# Patient Record
Sex: MALE | Race: WHITE | Employment: FULL TIME | URBAN - METROPOLITAN AREA
[De-identification: names, ages, dates, MRNs, and addresses within clinical notes are randomized per-mention and may not be internally consistent; named-entity substitution may affect disease eponyms.]

---

## 2018-01-18 ENCOUNTER — HOSPITAL ENCOUNTER (INPATIENT)
Age: 58
LOS: 4 days | Discharge: HOME | DRG: 248 | End: 2018-01-22
Attending: INTERNAL MEDICINE | Admitting: INTERNAL MEDICINE
Payer: COMMERCIAL

## 2018-01-18 PROBLEM — I10 ESSENTIAL HYPERTENSION: Chronic | Status: ACTIVE | Noted: 2018-01-18

## 2018-01-18 PROBLEM — E78.5 DYSLIPIDEMIA: Chronic | Status: ACTIVE | Noted: 2018-01-18

## 2018-01-18 PROBLEM — I21.3 STEMI (ST ELEVATION MYOCARDIAL INFARCTION) (HCC): Status: ACTIVE | Noted: 2018-01-18

## 2018-01-18 PROBLEM — I21.02 STEMI INVOLVING LEFT ANTERIOR DESCENDING CORONARY ARTERY (HCC): Status: ACTIVE | Noted: 2018-01-18

## 2018-01-18 PROBLEM — Z72.0 TOBACCO ABUSE: Chronic | Status: ACTIVE | Noted: 2018-01-18

## 2018-01-18 PROBLEM — I25.10 CORONARY ARTERY DISEASE INVOLVING NATIVE CORONARY ARTERY OF NATIVE HEART: Chronic | Status: ACTIVE | Noted: 2018-01-18

## 2018-01-18 LAB
ACT BLD: 252 SECS (ref 70–128)
ACT BLD: 301 SECS (ref 70–128)
ALBUMIN SERPL-MCNC: 3.6 G/DL (ref 3.5–5)
ALBUMIN/GLOB SERPL: 1 {RATIO} (ref 1.2–3.5)
ALP SERPL-CCNC: 78 U/L (ref 50–136)
ALT SERPL-CCNC: 50 U/L (ref 12–65)
ANION GAP SERPL CALC-SCNC: 11 MMOL/L (ref 7–16)
AST SERPL-CCNC: 245 U/L (ref 15–37)
ATRIAL RATE: 94 BPM
BASOPHILS # BLD: 0 K/UL (ref 0–0.2)
BASOPHILS NFR BLD: 0 % (ref 0–2)
BILIRUB SERPL-MCNC: 0.4 MG/DL (ref 0.2–1.1)
BUN SERPL-MCNC: 12 MG/DL (ref 6–23)
CALCIUM SERPL-MCNC: 8.9 MG/DL (ref 8.3–10.4)
CALCULATED R AXIS, ECG10: -21 DEGREES
CALCULATED T AXIS, ECG11: 17 DEGREES
CHLORIDE SERPL-SCNC: 105 MMOL/L (ref 98–107)
CO2 SERPL-SCNC: 23 MMOL/L (ref 21–32)
CREAT SERPL-MCNC: 0.87 MG/DL (ref 0.8–1.5)
DIAGNOSIS, 93000: NORMAL
DIFFERENTIAL METHOD BLD: ABNORMAL
EOSINOPHIL # BLD: 0 K/UL (ref 0–0.8)
EOSINOPHIL NFR BLD: 0 % (ref 0.5–7.8)
ERYTHROCYTE [DISTWIDTH] IN BLOOD BY AUTOMATED COUNT: 13.4 % (ref 11.9–14.6)
GLOBULIN SER CALC-MCNC: 3.6 G/DL (ref 2.3–3.5)
GLUCOSE SERPL-MCNC: 160 MG/DL (ref 65–100)
HCT VFR BLD AUTO: 43.5 % (ref 41.1–50.3)
HGB BLD-MCNC: 15 G/DL (ref 13.6–17.2)
IMM GRANULOCYTES # BLD: 0 K/UL (ref 0–0.5)
IMM GRANULOCYTES NFR BLD AUTO: 0 % (ref 0–5)
LYMPHOCYTES # BLD: 2.6 K/UL (ref 0.5–4.6)
LYMPHOCYTES NFR BLD: 17 % (ref 13–44)
MCH RBC QN AUTO: 30.4 PG (ref 26.1–32.9)
MCHC RBC AUTO-ENTMCNC: 34.5 G/DL (ref 31.4–35)
MCV RBC AUTO: 88.1 FL (ref 79.6–97.8)
MONOCYTES # BLD: 0.6 K/UL (ref 0.1–1.3)
MONOCYTES NFR BLD: 4 % (ref 4–12)
NEUTS SEG # BLD: 11.7 K/UL (ref 1.7–8.2)
NEUTS SEG NFR BLD: 79 % (ref 43–78)
P-R INTERVAL, ECG05: 152 MS
PLATELET # BLD AUTO: 300 K/UL (ref 150–450)
PMV BLD AUTO: 10.6 FL (ref 10.8–14.1)
POTASSIUM SERPL-SCNC: 3.7 MMOL/L (ref 3.5–5.1)
PROT SERPL-MCNC: 7.2 G/DL (ref 6.3–8.2)
Q-T INTERVAL, ECG07: 366 MS
QRS DURATION, ECG06: 130 MS
QTC CALCULATION (BEZET), ECG08: 457 MS
RBC # BLD AUTO: 4.94 M/UL (ref 4.23–5.67)
SODIUM SERPL-SCNC: 139 MMOL/L (ref 136–145)
TROPONIN I SERPL-MCNC: >40 NG/ML (ref 0.02–0.05)
VENTRICULAR RATE, ECG03: 94 BPM
WBC # BLD AUTO: 15 K/UL (ref 4.3–11.1)

## 2018-01-18 PROCEDURE — 75810000275 HC EMERGENCY DEPT VISIT NO LEVEL OF CARE: Performed by: EMERGENCY MEDICINE

## 2018-01-18 PROCEDURE — 92978 ENDOLUMINL IVUS OCT C 1ST: CPT

## 2018-01-18 PROCEDURE — 77030004559 HC CATH ANGI DX SUPT CARD -B

## 2018-01-18 PROCEDURE — 77030004558 HC CATH ANGI DX SUPR TORQ CARD -A

## 2018-01-18 PROCEDURE — 77030012468 HC VLV BLEEDBK CNTRL ABBT -B

## 2018-01-18 PROCEDURE — C1769 GUIDE WIRE: HCPCS

## 2018-01-18 PROCEDURE — B2151ZZ FLUOROSCOPY OF LEFT HEART USING LOW OSMOLAR CONTRAST: ICD-10-PCS | Performed by: INTERNAL MEDICINE

## 2018-01-18 PROCEDURE — 77030019569 HC BND COMPR RAD TERU -B

## 2018-01-18 PROCEDURE — 93005 ELECTROCARDIOGRAM TRACING: CPT | Performed by: INTERNAL MEDICINE

## 2018-01-18 PROCEDURE — 74011250637 HC RX REV CODE- 250/637: Performed by: INTERNAL MEDICINE

## 2018-01-18 PROCEDURE — C1753 CATH, INTRAVAS ULTRASOUND: HCPCS

## 2018-01-18 PROCEDURE — C1725 CATH, TRANSLUMIN NON-LASER: HCPCS

## 2018-01-18 PROCEDURE — C1887 CATHETER, GUIDING: HCPCS

## 2018-01-18 PROCEDURE — 80053 COMPREHEN METABOLIC PANEL: CPT | Performed by: INTERNAL MEDICINE

## 2018-01-18 PROCEDURE — 74011250636 HC RX REV CODE- 250/636: Performed by: INTERNAL MEDICINE

## 2018-01-18 PROCEDURE — 92941 PRQ TRLML REVSC TOT OCCL AMI: CPT

## 2018-01-18 PROCEDURE — B2111ZZ FLUOROSCOPY OF MULTIPLE CORONARY ARTERIES USING LOW OSMOLAR CONTRAST: ICD-10-PCS | Performed by: INTERNAL MEDICINE

## 2018-01-18 PROCEDURE — 36415 COLL VENOUS BLD VENIPUNCTURE: CPT | Performed by: INTERNAL MEDICINE

## 2018-01-18 PROCEDURE — 99153 MOD SED SAME PHYS/QHP EA: CPT

## 2018-01-18 PROCEDURE — C1876 STENT, NON-COA/NON-COV W/DEL: HCPCS

## 2018-01-18 PROCEDURE — 85025 COMPLETE CBC W/AUTO DIFF WBC: CPT | Performed by: INTERNAL MEDICINE

## 2018-01-18 PROCEDURE — 85347 COAGULATION TIME ACTIVATED: CPT

## 2018-01-18 PROCEDURE — 02C03ZZ EXTIRPATION OF MATTER FROM CORONARY ARTERY, ONE ARTERY, PERCUTANEOUS APPROACH: ICD-10-PCS | Performed by: INTERNAL MEDICINE

## 2018-01-18 PROCEDURE — 74011000250 HC RX REV CODE- 250: Performed by: INTERNAL MEDICINE

## 2018-01-18 PROCEDURE — 84484 ASSAY OF TROPONIN QUANT: CPT | Performed by: INTERNAL MEDICINE

## 2018-01-18 PROCEDURE — 4A023N7 MEASUREMENT OF CARDIAC SAMPLING AND PRESSURE, LEFT HEART, PERCUTANEOUS APPROACH: ICD-10-PCS | Performed by: INTERNAL MEDICINE

## 2018-01-18 PROCEDURE — 93458 L HRT ARTERY/VENTRICLE ANGIO: CPT

## 2018-01-18 PROCEDURE — 74011636320 HC RX REV CODE- 636/320: Performed by: INTERNAL MEDICINE

## 2018-01-18 PROCEDURE — C8929 TTE W OR WO FOL WCON,DOPPLER: HCPCS

## 2018-01-18 PROCEDURE — 65610000006 HC RM INTENSIVE CARE

## 2018-01-18 PROCEDURE — 02713EZ DILATION OF CORONARY ARTERY, TWO ARTERIES WITH TWO INTRALUMINAL DEVICES, PERCUTANEOUS APPROACH: ICD-10-PCS | Performed by: INTERNAL MEDICINE

## 2018-01-18 PROCEDURE — C1894 INTRO/SHEATH, NON-LASER: HCPCS

## 2018-01-18 PROCEDURE — C1757 CATH, THROMBECTOMY/EMBOLECT: HCPCS

## 2018-01-18 PROCEDURE — 99152 MOD SED SAME PHYS/QHP 5/>YRS: CPT

## 2018-01-18 RX ORDER — HEPARIN SODIUM 10000 [USP'U]/ML
2000-9000 INJECTION, SOLUTION INTRAVENOUS; SUBCUTANEOUS
Status: DISCONTINUED | OUTPATIENT
Start: 2018-01-18 | End: 2018-01-22 | Stop reason: HOSPADM

## 2018-01-18 RX ORDER — SODIUM CHLORIDE 9 MG/ML
75 INJECTION, SOLUTION INTRAVENOUS CONTINUOUS
Status: DISCONTINUED | OUTPATIENT
Start: 2018-01-18 | End: 2018-01-20

## 2018-01-18 RX ORDER — ATORVASTATIN CALCIUM 40 MG/1
80 TABLET, FILM COATED ORAL
Status: DISCONTINUED | OUTPATIENT
Start: 2018-01-18 | End: 2018-01-22 | Stop reason: HOSPADM

## 2018-01-18 RX ORDER — SODIUM CHLORIDE 0.9 % (FLUSH) 0.9 %
5-10 SYRINGE (ML) INJECTION AS NEEDED
Status: DISCONTINUED | OUTPATIENT
Start: 2018-01-18 | End: 2018-01-22 | Stop reason: HOSPADM

## 2018-01-18 RX ORDER — GUAIFENESIN 100 MG/5ML
81 LIQUID (ML) ORAL DAILY
Status: DISCONTINUED | OUTPATIENT
Start: 2018-01-19 | End: 2018-01-22 | Stop reason: HOSPADM

## 2018-01-18 RX ORDER — HYDRALAZINE HYDROCHLORIDE 20 MG/ML
10 INJECTION INTRAMUSCULAR; INTRAVENOUS
Status: DISCONTINUED | OUTPATIENT
Start: 2018-01-18 | End: 2018-01-22 | Stop reason: HOSPADM

## 2018-01-18 RX ORDER — FENTANYL CITRATE 50 UG/ML
25-75 INJECTION, SOLUTION INTRAMUSCULAR; INTRAVENOUS
Status: DISCONTINUED | OUTPATIENT
Start: 2018-01-18 | End: 2018-01-22 | Stop reason: HOSPADM

## 2018-01-18 RX ORDER — SODIUM CHLORIDE 0.9 % (FLUSH) 0.9 %
5-10 SYRINGE (ML) INJECTION EVERY 8 HOURS
Status: DISCONTINUED | OUTPATIENT
Start: 2018-01-18 | End: 2018-01-22 | Stop reason: HOSPADM

## 2018-01-18 RX ORDER — LORAZEPAM 1 MG/1
1 TABLET ORAL
Status: DISCONTINUED | OUTPATIENT
Start: 2018-01-18 | End: 2018-01-22 | Stop reason: HOSPADM

## 2018-01-18 RX ORDER — ACETAMINOPHEN 325 MG/1
650 TABLET ORAL
Status: DISCONTINUED | OUTPATIENT
Start: 2018-01-18 | End: 2018-01-22 | Stop reason: HOSPADM

## 2018-01-18 RX ORDER — ONDANSETRON 2 MG/ML
4 INJECTION INTRAMUSCULAR; INTRAVENOUS AS NEEDED
Status: DISCONTINUED | OUTPATIENT
Start: 2018-01-18 | End: 2018-01-22 | Stop reason: HOSPADM

## 2018-01-18 RX ORDER — CARVEDILOL 12.5 MG/1
12.5 TABLET ORAL
Status: DISCONTINUED | OUTPATIENT
Start: 2018-01-18 | End: 2018-01-22 | Stop reason: HOSPADM

## 2018-01-18 RX ORDER — CARVEDILOL 6.25 MG/1
6.25 TABLET ORAL ONCE
Status: COMPLETED | OUTPATIENT
Start: 2018-01-18 | End: 2018-01-18

## 2018-01-18 RX ORDER — CARVEDILOL 3.12 MG/1
3.12 TABLET ORAL EVERY 12 HOURS
Status: DISCONTINUED | OUTPATIENT
Start: 2018-01-18 | End: 2018-01-18

## 2018-01-18 RX ORDER — HYDROCODONE BITARTRATE AND ACETAMINOPHEN 5; 325 MG/1; MG/1
1 TABLET ORAL
Status: DISCONTINUED | OUTPATIENT
Start: 2018-01-18 | End: 2018-01-22 | Stop reason: HOSPADM

## 2018-01-18 RX ORDER — MORPHINE SULFATE 2 MG/ML
2 INJECTION, SOLUTION INTRAMUSCULAR; INTRAVENOUS
Status: DISCONTINUED | OUTPATIENT
Start: 2018-01-18 | End: 2018-01-22 | Stop reason: HOSPADM

## 2018-01-18 RX ORDER — EPTIFIBATIDE 0.75 MG/ML
2 INJECTION, SOLUTION INTRAVENOUS CONTINUOUS
Status: DISCONTINUED | OUTPATIENT
Start: 2018-01-18 | End: 2018-01-22 | Stop reason: HOSPADM

## 2018-01-18 RX ORDER — LISINOPRIL 5 MG/1
10 TABLET ORAL DAILY
Status: DISCONTINUED | OUTPATIENT
Start: 2018-01-19 | End: 2018-01-22 | Stop reason: HOSPADM

## 2018-01-18 RX ORDER — MIDAZOLAM HYDROCHLORIDE 1 MG/ML
.5-2 INJECTION, SOLUTION INTRAMUSCULAR; INTRAVENOUS
Status: DISCONTINUED | OUTPATIENT
Start: 2018-01-18 | End: 2018-01-22 | Stop reason: HOSPADM

## 2018-01-18 RX ORDER — LIDOCAINE HYDROCHLORIDE 20 MG/ML
2-10 INJECTION, SOLUTION INFILTRATION; PERINEURAL
Status: DISCONTINUED | OUTPATIENT
Start: 2018-01-18 | End: 2018-01-22 | Stop reason: HOSPADM

## 2018-01-18 RX ORDER — HEPARIN SODIUM 200 [USP'U]/100ML
3 INJECTION, SOLUTION INTRAVENOUS CONTINUOUS
Status: DISCONTINUED | OUTPATIENT
Start: 2018-01-18 | End: 2018-01-22 | Stop reason: HOSPADM

## 2018-01-18 RX ORDER — NITROGLYCERIN 0.4 MG/1
0.4 TABLET SUBLINGUAL
Status: DISCONTINUED | OUTPATIENT
Start: 2018-01-18 | End: 2018-01-22 | Stop reason: HOSPADM

## 2018-01-18 RX ADMIN — HEPARIN 3 UNITS/HR: 100 SYRINGE at 13:12

## 2018-01-18 RX ADMIN — SODIUM CHLORIDE 75 ML/HR: 900 INJECTION, SOLUTION INTRAVENOUS at 15:30

## 2018-01-18 RX ADMIN — HYDROCODONE BITARTRATE AND ACETAMINOPHEN 1 TABLET: 5; 325 TABLET ORAL at 20:16

## 2018-01-18 RX ADMIN — PERFLUTREN 1 ML: 6.52 INJECTION, SUSPENSION INTRAVENOUS at 16:14

## 2018-01-18 RX ADMIN — FENTANYL CITRATE 50 MCG: 50 INJECTION, SOLUTION INTRAMUSCULAR; INTRAVENOUS at 13:13

## 2018-01-18 RX ADMIN — CARVEDILOL 3.12 MG: 3.12 TABLET, FILM COATED ORAL at 16:33

## 2018-01-18 RX ADMIN — TICAGRELOR 180 MG: 90 TABLET ORAL at 14:11

## 2018-01-18 RX ADMIN — IOPAMIDOL 275 ML: 755 INJECTION, SOLUTION INTRAVENOUS at 14:17

## 2018-01-18 RX ADMIN — MIDAZOLAM HYDROCHLORIDE 2 MG: 1 INJECTION, SOLUTION INTRAMUSCULAR; INTRAVENOUS at 13:13

## 2018-01-18 RX ADMIN — HEPARIN SODIUM 9000 UNITS: 10000 INJECTION, SOLUTION INTRAVENOUS; SUBCUTANEOUS at 13:16

## 2018-01-18 RX ADMIN — CARVEDILOL 6.25 MG: 6.25 TABLET, FILM COATED ORAL at 19:44

## 2018-01-18 RX ADMIN — LIDOCAINE HYDROCHLORIDE 60 MG: 20 INJECTION, SOLUTION INFILTRATION; PERINEURAL at 13:18

## 2018-01-18 RX ADMIN — HEPARIN SODIUM 2 ML: 10000 INJECTION, SOLUTION INTRAVENOUS; SUBCUTANEOUS at 13:18

## 2018-01-18 RX ADMIN — ONDANSETRON 4 MG: 2 INJECTION INTRAMUSCULAR; INTRAVENOUS at 13:14

## 2018-01-18 RX ADMIN — Medication 10 ML: at 20:11

## 2018-01-18 RX ADMIN — EPTIFIBATIDE 2 MCG/KG/MIN: 0.75 INJECTION, SOLUTION INTRAVENOUS at 13:24

## 2018-01-18 NOTE — PROGRESS NOTES
Patient came into the ER with a stemi, patient was transferred to the Cath Lab where the patient received treatment.  provided support, prayer and communication to the family members. The patient was transferred to the ICU/CCU unit for continued care.         [employer]

## 2018-01-18 NOTE — PROCEDURES
[address] [place] [address]    Tammi Mishra  MR#: [id number]  : 1960  ACCOUNT #: [de-identified]   DATE OF SERVICE: 2018    PRIMARY CARDIOLOGIST:  None. PRIMARY CARE PHYSICIAN:  None. BRIEF HISTORY:  The patient is a 67-year-old gentleman with no past medical history and really no medical care with extensive tobacco abuse, presents with acute onset chest pain yesterday, which resolved and came back this morning and he was brought emergently to the cardiac catheterization lab due ST elevation anterior myocardial infarction. DESCRIPTION OF PROCEDURE: After informed consent, he was prepped and draped in the usual sterile fashion. The right wrist was infiltrated with lidocaine. The right radial artery was accessed via micropuncture technique. A 6-Syriac sheath was advanced without complications. A 5 Western Cristine JR5 was utilized for right coronary injection. A 6-Syriac XB 3.0 guide was utilized for left coronary injection as well as left anterior descending PCI and left main IVUS. A 6-Syriac angled pigtail was utilized for left ventriculography. Isovue contrast utilized. ANESTHESIA: Conscious sedation. START TIME:  1:13 p.m.    END TIME:  2:16 p.m. MEDICATIONS: Versed 2 mg and 30 mcg of  fentanyl. MONITORING RN: Wendy Ramires. FINDINGS  1. Left ventricle: Moderate LV systolic dysfunction with ejection fraction of 40% with anterior akinesis present. Left ventricular and diastolic pressures measured 24 mmHg and there was a 15 mm aortic valve gradient. 2.  Left main:  The left main is large and somewhat ectatic proximally, but tapers briskly distally with extensive calcification. It appears to be approximately at 80% stenosis, bifurcates to the LAD and circumflex systems. 3.  Left anterior descending coronary: This is a large vessel. It is extremely calcified.   It is occluded in the proximal portion just beyond a very proximal diagonal.  4.  Left circumflex coronary artery: This is a codominant vessel. It has proximal 90% stenosis with a moderate sized first obtuse marginal and a left posterior descending present. 5.  Right coronary: This is a small codominant vessel with 80% proximal stenosis. INTRAVASCULAR ULTRASOUND ANALYSIS: Lesion, left main. DETAILS: The patient anticoagulated with heparin and Integrilin. Intravascular ultrasound analysis demonstrates severe distal left main calcification with a minimal luminal area of 6.2 cm2, which suggests hemodynamically significant distal left main stenosis. The LAD also demonstrates extensive calcification throughout the proximal portion. PERCUTANEOUS CORONARY INTERVENTION: Lesion, proximal LAD. Pre-stenosis 100% , post-stenosis 0%. DETAILS:  The patient anticoagulated with heparin and Integrilin. After adequate ACT, a 6-Uzbek XB 3.5 guide was utilized. Sidra Al was advanced distally. The lesion was initially aspirated with a priority I aspiration catheter, which restored MAGUI 3 flow. We had extensive difficulty delivering balloons distally due to the extreme calcification and a 2.5 x 12 U4 balloon was utilized initially followed by 2.5 x 20 NC [place] balloon. Despite predilatation, we were still unable to deliver a Vision bare metal stent. Despite aggressive efforts and a GuideLiner support, we were unable to cross the distal lesion. The Vision was then removed and a 2.5 x 26 Integrity stent was then utilized and was able to be delivered distally with the aid of a GuideLiner. During delivering this, there appeared to be disruption of the more proximal LAD, likely from aggressive GuideLiner manipulation. The stent was then deployed successfully followed by an additional 2.5 x 16 Integrity bare metal stent extending back to the ostium of the LAD. These were all inflated to high pressures and postdilated with noncompliant balloon.   Post-procedural intravascular ultrasound analysis was performed demonstrating stent apposition throughout the entire proximal LAD with approximately 2 struts hanging back into the distal left main. These all appear opposed. There is MAGUI 3 flow with contained proximal LAD disruption. Brilinta 180 mg of Brilinta were administered in the lab. Successful hemostasis with pneumatic radial band. CONCLUSIONS  1. Moderate left ventricular systolic dysfunction. 2.  Severe distal left main stenosis confirmed by intravascular ultrasound analysis. 3.  Severe calcific proximal LAD disease with abrupt occlusion, status post aspiration thrombectomy and Integrity bare metal stent x2, status post, post-procedural IVUS assessment due to proximal LAD disruption. 4.  Severe proximal codominant left circumflex stenosis. 5.  Severe small codominant right coronary artery stenosis. RECOMMENDATIONS:  The patient will be managed medically in approximately two to four weeks. Should undergo coronary bypass grafting with LIMA to the LAD, vein graft to the diagonal, vein graft to the first obtuse marginal and vein graft to the left posterior descending. Thank you for allowing us to participate in the care of this patient. If there are questions or concerns, please feel free to contact me. Sincerely Addie Rider put a conscious sedation, right before. time 1:13 p.m.  time 2:16 p.m. MEDICATIONS:  2 mg Versed, 50 mcg of fentanyl.       Rainell Pila, MD Tessie Cogan / Raymundo  D: 01/18/2018 14:39     T: 01/18/2018 15:19  JOB #: [id number]

## 2018-01-18 NOTE — PROGRESS NOTES
Patient came into the ER with a stemi, patient was transferred to the Cath Lab where the patient received treatment.  provided support, prayer and communication to the family members. The patient was transferred to the CVICU unit for continued care.       [employer]

## 2018-01-18 NOTE — IP AVS SNAPSHOT
[address] 
[phone] Patient: Rebecca Boyd MRN: [id number] QTF:48/5/7856 About your hospitalization You were admitted on:  January 18, 2018 You last received care in the:  [facility] 2 CV STEPDOWN You were discharged on:  January 22, 2018 Why you were hospitalized Your primary diagnosis was:  Stemi Involving Left Anterior Descending Coronary Artery (Hcc) Your diagnoses also included:  Coronary Artery Disease Involving Native Coronary Artery Of Native Heart, Essential Hypertension, Dyslipidemia, Tobacco Abuse, Stemi (St Elevation Myocardial Infarction) (Hcc), Family History Of Premature Cad, Claudication Of Left Lower Extremity (Hcc), Acute Diastolic Chf (Congestive Heart Failure) (Hcc), Nonrheumatic Aortic Valve Stenosis Follow-up Information Follow up With Details Comments Contact Info Provider Unknown   Patient not available to ask [facility] OFFICE In 7 days office will call with appointment [address] 
[id number] [place] [address] 
[phone] Rebecca Montano MD Call in 1 month call to make appointment 217 [facility] 120 [facility] CARDIO THORACIC [facility] [address] 
[phone] Discharge Orders None A check isac indicates which time of day the medication should be taken. My Medications START taking these medications Instructions Each Dose to Equal  
 Morning Noon Evening Bedtime  
 aspirin 81 mg chewable tablet Start taking on:  1/23/2018 Your next dose is:  1/23/18 Take 1 Tab by mouth daily. 81 mg  
    
  
   
   
   
  
 atorvastatin 80 mg tablet Commonly known as:  LIPITOR Your next dose is:  1/22/18 Take 1 Tab by mouth nightly. 80 mg  
    
   
   
   
  
  
 carvedilol 12.5 mg tablet Commonly known as:  Shady Berryers Your next dose is:  1/23/18 Take 1 Tab by mouth two (2) times daily (after meals). 12.5 mg  
    
  
   
   
  
   
  
 lisinopril 10 mg tablet Commonly known as:  Madonna Cliche Start taking on:  1/23/2018 Take 1 Tab by mouth daily. 10 mg  
    
  
   
   
   
  
 nitroglycerin 0.4 mg SL tablet Commonly known as:  NITROSTAT  
   
 1 Tab by SubLINGual route every five (5) minutes as needed for Chest Pain. 0.4 mg  
    
   
   
   
  
 ticagrelor 90 mg tablet Commonly known as:  [place] Start taking on:  1/23/2018 Take 1 Tab by mouth every twelve (12) hours every twelve (12) hours. 90 mg Where to Get Your Medications These medications were sent to [address] [place] [address], [address] [place] [address]  29[address] [facility], [address] [place] [address] Phone:  [phone]  
  atorvastatin 80 mg tablet  
 carvedilol 12.5 mg tablet  
 lisinopril 10 mg tablet  
 nitroglycerin 0.4 mg SL tablet  
 ticagrelor 90 mg tablet Discharge Instructions None datangoSalina Announcement We are excited to announce that we are making your provider's discharge notes available to you in RV ID. You will see these notes when they are completed and signed by the physician that discharged you from your recent hospital stay. If you have any questions or concerns about any information you see in RV ID, please call the Health Information Department where you were seen or reach out to your Primary Care Provider for more information about your plan of care. Introducing [facility]! [facility] introduces RV ID patient portal. Now you can access parts of your medical record, email your doctor's office, and request medication refills online. 1. In your internet browser, go to [email or web address]hart 2. Click on the First Time User? Click Here link in the Sign In box. You will see the New Member Sign Up page. 3. Enter your DOZ Access Code exactly as it appears below. You will not need to use this code after youve completed the sign-up process. If you do not sign up before the expiration date, you must request a new code. · DOZ Access Code: [email or web address] Expires: 4/18/2018  1:10 PM 
 
4. Enter the last four digits of your Social Security Number (xxxx) and Date of Birth (mm/dd/yyyy) as indicated and click Submit. You will be taken to the next sign-up page. 5. Create a EQOt ID. This will be your DOZ login ID and cannot be changed, so think of one that is secure and easy to remember. 6. Create a DOZ password. You can change your password at any time. 7. Enter your Password Reset Question and Answer. This can be used at a later time if you forget your password. 8. Enter your e-mail address. You will receive e-mail notification when new information is available in [address] 9. Click Sign Up. You can now view and download portions of your medical record. 10. Click the Download Summary menu link to download a portable copy of your medical information. If you have questions, please visit the Frequently Asked Questions section of the DOZ website. Remember, DOZ is NOT to be used for urgent needs. For medical emergencies, dial 911. Now available from your iPhone and Android! Providers Seen During Your Hospitalization Provider Specialty Primary office phone Shamar Carrillo MD Cardiology [phone] Immunizations Administered for This Admission Name Date Influenza Vaccine (Quad) PF 1/22/2018 Your Primary Care Physician (PCP) Primary Care Physician Office Phone Office Fax UNKNOWN, PROVIDER ** None ** ** None ** You are allergic to the following No active allergies Recent Documentation Height Weight BMI Smoking Status 1.778 m 90.4 kg 28.61 kg/m2 Current Every Day Smoker Emergency Contacts Name Discharge Info Relation Home Work Mobile Frankie Quintanilla   [phone] Patient Belongings The following personal items are in your possession at time of discharge: 
  Dental Appliances: None  Visual Aid: Glasses, With patient      Home Medications: None   Jewelry: Ring  Clothing: None    Other Valuables: None Please provide this summary of care documentation to your next provider. Signatures-by signing, you are acknowledging that this After Visit Summary has been reviewed with you and you have received a copy. Patient Signature:  ____________________________________________________________ Date:  ____________________________________________________________  
  
[facility] Provider Signature:  ____________________________________________________________ Date:  ____________________________________________________________

## 2018-01-18 NOTE — PROGRESS NOTES
Applied TR-band to right wrist with 14 mL of air in band. Site without bleeding or hematoma. Capillary refill distal to the site was less than 2 seconds. Patient instructed to limit movement of affected wrist. Patient verbalized understanding.

## 2018-01-18 NOTE — H&P
[facility] CARDIOLOGY History &Physical                Primary Cardiologist: None    Primary Care Physician:  No primary care provider on file. Subjective:     Patient is a 59 y.o. male presents with no prior medical history. He has a family history of CAD but not premature. He is a smoker and states he had CP yesterday that resolved spontaneously. He states approximately 1 hr ago. He is brought in by EMS with ongoing 6/10 CP. No past medical history on file. No current facility-administered medications for this encounter. No current outpatient prescriptions on file. Allergies not on file  Social History   Substance Use Topics    Smoking status: Not on file    Smokeless tobacco: Not on file    Alcohol use Not on file      No family history on file. Review of Systems    Review of Systems   Constitution: Negative for chills and fever. Eyes: Negative for visual disturbance. Cardiovascular: Positive for chest pain and dyspnea on exertion. Negative for palpitations and syncope. Respiratory: Negative for cough and shortness of breath. Skin: Negative for color change and rash. Musculoskeletal: Negative for joint pain and muscle cramps. Gastrointestinal: Negative for abdominal pain, diarrhea and nausea. Genitourinary: Negative for dysuria. Neurological: Negative for dizziness and headaches. Psychiatric/Behavioral: Negative for depression. Objective: There were no vitals taken for this visit. Physical Exam   Constitutional: He is oriented to person, place, and time. He appears well-developed and well-nourished. HENT:   Head: Normocephalic and atraumatic. Mouth/Throat: Oropharynx is clear and moist.   Cardiovascular: Normal rate, regular rhythm and normal heart sounds. Pulmonary/Chest: Breath sounds normal. He has no wheezes. He has no rales. Abdominal: Soft. Bowel sounds are normal.   Musculoskeletal: Normal range of motion.    Neurological: He is alert and oriented to person, place, and time. Skin: Skin is warm and dry. Psychiatric: He has a normal mood and affect. ECG: normal EKG, normal sinus rhythm, unchanged from previous tracings, normal sinus rhythm, RBBB, ST elevation in anteriorly     Data Review:     No results found for this or any previous visit (from the past 24 hour(s)). Assessment/Plan:   Principal Problem:    STEMI involving left anterior descending coronary artery ([address] (1/18/2018) - Anterior ST elevation and ongoing CP. Plan [facility] emergently. Medical therapy pending clinical outcome.       Active Problems:    Coronary artery disease involving native coronary artery of native heart (1/18/2018) - Medical therapy pending clinical outcome      Essential hypertension (1/18/2018) - Carvedilol and lisinopril      Dyslipidemia (1/18/2018) - Atorvastatin 80mg daily      Tobacco abuse (1/18/2018) - Cessation education            Silvestre Brody MD

## 2018-01-18 NOTE — IP AVS SNAPSHOT
Summary of Care Report The Summary of Care report has been created to help improve care coordination. Users with access to [employer] or [employer] [address] (Web-based application) may access additional patient information including the Discharge Summary. If you are not currently a [address] user and need more information, please call the number listed below in the [place] [address] section and ask to be connected with Medical Records. Facility Information Name Address Phone [address] [address] [address] [phone] Patient Information Patient Name Sex  Genet Barros ([id number]) Male 1960 Discharge Information Admitting Provider Service Area Unit Woo Campos MD / Aj BRASHER [phone] [place] [address] [facility] / [phone] Discharge Provider Discharge Date/Time Discharge Disposition Destination (none) 2018 (Pending) AHR (none) Patient Language Language ENGLISH [13] Hospital Problems as of 2018  Never Reviewed Class Noted - Resolved Last Modified POA Active Problems * (Principal)STEMI involving left anterior descending coronary artery ([facility]  2018 - Present 2018 by Woo Campos MD Unknown Entered by Woo Campos MD  
  Coronary artery disease involving native coronary artery of native heart (Chronic)  2018 - Present 2018 by Woo Campos MD Unknown Entered by Woo Campos MD  
  Essential hypertension (Chronic)  2018 - Present 2018 by Woo Campos MD Unknown Entered by Woo Campos MD  
  Dyslipidemia (Chronic)  2018 - Present 2018 by Woo Campos MD Unknown Entered by Woo Campos MD  
  Tobacco abuse (Chronic)  2018 - Present 2018 by Woo Campos MD Unknown Entered by Stevan Rich MD  
  STEMI (ST elevation myocardial infarction) ([facility]  1/18/2018 - Present 1/18/2018 by Stevan Rich MD Unknown Entered by Stevan Rich MD  
  Family history of premature CAD  1/19/2018 - Present 1/19/2018 by Vasquez Tiwari NP Yes Entered by Vasquez Tiwari NP Claudication of left lower extremity ([facility]  1/19/2018 - Present 1/19/2018 by Vasquez Tiwari NP Yes Entered by Vasquez Tiwari NP Acute diastolic CHF (congestive heart failure) ([facility]  1/19/2018 - Present 1/19/2018 by Vasquez Tiwari NP Yes Entered by Vasquez Tiwari NP Nonrheumatic aortic valve stenosis  1/20/2018 - Present 1/20/2018 by Darion Overton MD Unknown Entered by Darion Overton MD  
  
You are allergic to the following No active allergies Current Discharge Medication List  
  
START taking these medications Dose & Instructions Dispensing Information Comments  
 aspirin 81 mg chewable tablet Start taking on:  1/23/2018 Dose:  81 mg Take 1 Tab by mouth daily. Refills:  0  
   
 atorvastatin 80 mg tablet Commonly known as:  LIPITOR Dose:  80 mg Take 1 Tab by mouth nightly. Quantity:  30 Tab Refills:  11  
   
 carvedilol 12.5 mg tablet Commonly known as:  Phil Maida Dose:  12.5 mg Take 1 Tab by mouth two (2) times daily (after meals). Quantity:  60 Tab Refills:  11  
   
 lisinopril 10 mg tablet Commonly known as:  [place] Start taking on:  1/23/2018 Dose:  10 mg Take 1 Tab by mouth daily. Quantity:  30 Tab Refills:  11  
   
 nitroglycerin 0.4 mg SL tablet Commonly known as:  NITROSTAT Dose:  0.4 mg  
1 Tab by SubLINGual route every five (5) minutes as needed for Chest Pain. Quantity:  1 Bottle Refills:  5  
   
 ticagrelor 90 mg tablet Commonly known as:  [employer] Start taking on:  1/23/2018  Dose:  90 mg  
 Take 1 Tab by mouth every twelve (12) hours every twelve (12) hours. Quantity:  60 Tab Refills:  11  
   
  
  
  
Current Immunizations Name Date Influenza Vaccine (Quad) PF 1/22/2018 Follow-up Information Follow up With Details Comments Contact Info Provider Unknown   Patient not available to ask [facility] OFFICE In 7 days office will call with appointment [address] 
[address] [address] [facility] 
[phone] Mayela Shoemaker MD Call in 1 month [facility] [address] [facility] CARDIO THORACIC [place] [address] 
[phone] Discharge Instructions None Chart Review Routing History No Routing History on File

## 2018-01-18 NOTE — IP AVS SNAPSHOT
Romana Halo 
 
 
 [address] 
[phone] Patient: Vu Jose MRN: [id number] PRR:49/2/6256 A check isac indicates which time of day the medication should be taken. My Medications START taking these medications Instructions Each Dose to Equal  
 Morning Noon Evening Bedtime  
 aspirin 81 mg chewable tablet Start taking on:  1/23/2018 Your next dose is:  1/23/18 Take 1 Tab by mouth daily. 81 mg  
    
  
   
   
   
  
 atorvastatin 80 mg tablet Commonly known as:  LIPITOR Your next dose is:  1/22/18 Take 1 Tab by mouth nightly. 80 mg  
    
   
   
   
  
  
 carvedilol 12.5 mg tablet Commonly known as:  [place] Your next dose is:  1/23/18 Take 1 Tab by mouth two (2) times daily (after meals). 12.5 mg  
    
  
   
   
  
   
  
 lisinopril 10 mg tablet Commonly known as:  Trinity Reardon Start taking on:  1/23/2018 Take 1 Tab by mouth daily. 10 mg  
    
  
   
   
   
  
 nitroglycerin 0.4 mg SL tablet Commonly known as:  NITROSTAT  
   
 1 Tab by SubLINGual route every five (5) minutes as needed for Chest Pain. 0.4 mg  
    
   
   
   
  
 ticagrelor 90 mg tablet Commonly known as:  [place] [employer] Start taking on:  1/23/2018 Take 1 Tab by mouth every twelve (12) hours every twelve (12) hours. 90 mg Where to Get Your Medications These medications were sent to [address] [place] [address], [address] [place] [address]  2900 [facility], [address] [place] [address] Phone:  [phone]  
  atorvastatin 80 mg tablet  
 carvedilol 12.5 mg tablet  
 lisinopril 10 mg tablet  
 nitroglycerin 0.4 mg SL tablet  
 ticagrelor 90 mg tablet

## 2018-01-18 NOTE — ROUTINE PROCESS
TRANSFER - OUT REPORT:    STEMI  Dr. Kai Henson  RRA access    Versed 2mg, fentanyl 50mcg, zofran 4mg, brilinta 180mg, heparin 9000 units IV  Integrilin gtt @ 15ml/hr, gtt to continue until current bottle finished infusing  2 stents in LAD, pt to return at later date for CABG  TR band placed @ 5605 with 14ml air    Verbal report given to cvicu RN(name) on Geoffery Soda  being transferred to cvicu(unit) for routine progression of care      Report consisted of patients Situation, Background, Assessment and   Recommendations(SBAR). Information from the following report(s) Procedure Summary was reviewed with the receiving nurse. Lines:       Opportunity for questions and clarification was provided.       Patient transported with:   Monitor

## 2018-01-18 NOTE — PROCEDURES
Brief Cardiac Procedure Note    Patient: Baljinder Pitts MRN: [id number]  SSN: [id number]    YOB: 1960  Age: 59 y.o. Sex: male      Date of Procedure: 1/18/2018     Pre-procedure Diagnosis: STEMI    Post-procedure Diagnosis: Congestive Heart Failure and Coronary Artery Disease    Procedure: Left Heart Catheterization with Percutaneous Coronary Intervention    Brief Description of Procedure: See note    Performed By: Lucio Duvall MD     Assistants: None    Anesthesia: Moderate Sedation    Estimated Blood Loss: Less than 10 mL      Specimens: None    Implants: None    Findings:   LV:  EF 40%  LM:  80%  LAD:  100%  LCx:  90% prox - Codominant  RCA:  90% mid - small codominant    PCI pLAD 100-0%   Priority One aspiration   2.5x15 Euphora   2.5x20 NC [place]   Guidezilla   2.5x26 Integrity   2.5x12 Integrity    IVUS LM   Severe concentric Ca++   MLA 4.3XJ2      Complications: None    Recommendations: Continue medical therapy.   CABG consult    Signed By: Lucio Duvall MD     January 18, 2018

## 2018-01-19 PROBLEM — Z82.49 FAMILY HISTORY OF PREMATURE CAD: Status: ACTIVE | Noted: 2018-01-19

## 2018-01-19 PROBLEM — I50.31 ACUTE DIASTOLIC CHF (CONGESTIVE HEART FAILURE) (HCC): Status: ACTIVE | Noted: 2018-01-19

## 2018-01-19 PROBLEM — I73.9 CLAUDICATION OF LEFT LOWER EXTREMITY (HCC): Status: ACTIVE | Noted: 2018-01-19

## 2018-01-19 LAB
ANION GAP SERPL CALC-SCNC: 9 MMOL/L (ref 7–16)
ATRIAL RATE: 86 BPM
BASOPHILS # BLD: 0 K/UL (ref 0–0.2)
BASOPHILS NFR BLD: 0 % (ref 0–2)
BUN SERPL-MCNC: 8 MG/DL (ref 6–23)
CALCIUM SERPL-MCNC: 8.4 MG/DL (ref 8.3–10.4)
CALCULATED P AXIS, ECG09: 62 DEGREES
CALCULATED R AXIS, ECG10: 70 DEGREES
CALCULATED T AXIS, ECG11: 92 DEGREES
CHLORIDE SERPL-SCNC: 108 MMOL/L (ref 98–107)
CHOLEST SERPL-MCNC: 216 MG/DL
CO2 SERPL-SCNC: 23 MMOL/L (ref 21–32)
CREAT SERPL-MCNC: 0.7 MG/DL (ref 0.8–1.5)
DIAGNOSIS, 93000: NORMAL
DIFFERENTIAL METHOD BLD: ABNORMAL
EOSINOPHIL # BLD: 0.2 K/UL (ref 0–0.8)
EOSINOPHIL NFR BLD: 1 % (ref 0.5–7.8)
ERYTHROCYTE [DISTWIDTH] IN BLOOD BY AUTOMATED COUNT: 13.5 % (ref 11.9–14.6)
GLUCOSE SERPL-MCNC: 118 MG/DL (ref 65–100)
HCT VFR BLD AUTO: 42.8 % (ref 41.1–50.3)
HDLC SERPL-MCNC: 40 MG/DL (ref 40–60)
HDLC SERPL: 5.4 {RATIO}
HGB BLD-MCNC: 14.4 G/DL (ref 13.6–17.2)
IMM GRANULOCYTES # BLD: 0 K/UL (ref 0–0.5)
IMM GRANULOCYTES NFR BLD AUTO: 0 % (ref 0–5)
LDLC SERPL CALC-MCNC: 137.2 MG/DL
LIPID PROFILE,FLP: ABNORMAL
LYMPHOCYTES # BLD: 2.7 K/UL (ref 0.5–4.6)
LYMPHOCYTES NFR BLD: 21 % (ref 13–44)
MCH RBC QN AUTO: 29.8 PG (ref 26.1–32.9)
MCHC RBC AUTO-ENTMCNC: 33.6 G/DL (ref 31.4–35)
MCV RBC AUTO: 88.6 FL (ref 79.6–97.8)
MONOCYTES # BLD: 0.9 K/UL (ref 0.1–1.3)
MONOCYTES NFR BLD: 7 % (ref 4–12)
NEUTS SEG # BLD: 8.9 K/UL (ref 1.7–8.2)
NEUTS SEG NFR BLD: 71 % (ref 43–78)
P-R INTERVAL, ECG05: 136 MS
PLATELET # BLD AUTO: 268 K/UL (ref 150–450)
PMV BLD AUTO: 10.4 FL (ref 10.8–14.1)
POTASSIUM SERPL-SCNC: 3.9 MMOL/L (ref 3.5–5.1)
Q-T INTERVAL, ECG07: 450 MS
QRS DURATION, ECG06: 126 MS
QTC CALCULATION (BEZET), ECG08: 538 MS
RBC # BLD AUTO: 4.83 M/UL (ref 4.23–5.67)
SODIUM SERPL-SCNC: 140 MMOL/L (ref 136–145)
TRIGL SERPL-MCNC: 194 MG/DL (ref 35–150)
TROPONIN I SERPL-MCNC: >40 NG/ML (ref 0.02–0.05)
TROPONIN I SERPL-MCNC: >40 NG/ML (ref 0.02–0.05)
VENTRICULAR RATE, ECG03: 86 BPM
VLDLC SERPL CALC-MCNC: 38.8 MG/DL (ref 6–23)
WBC # BLD AUTO: 12.6 K/UL (ref 4.3–11.1)

## 2018-01-19 PROCEDURE — 74011250637 HC RX REV CODE- 250/637: Performed by: INTERNAL MEDICINE

## 2018-01-19 PROCEDURE — 93005 ELECTROCARDIOGRAM TRACING: CPT | Performed by: INTERNAL MEDICINE

## 2018-01-19 PROCEDURE — 65660000000 HC RM CCU STEPDOWN

## 2018-01-19 PROCEDURE — 74011250637 HC RX REV CODE- 250/637: Performed by: NURSE PRACTITIONER

## 2018-01-19 PROCEDURE — 80061 LIPID PANEL: CPT | Performed by: INTERNAL MEDICINE

## 2018-01-19 PROCEDURE — 85025 COMPLETE CBC W/AUTO DIFF WBC: CPT | Performed by: INTERNAL MEDICINE

## 2018-01-19 PROCEDURE — 84484 ASSAY OF TROPONIN QUANT: CPT | Performed by: INTERNAL MEDICINE

## 2018-01-19 PROCEDURE — 80048 BASIC METABOLIC PNL TOTAL CA: CPT | Performed by: INTERNAL MEDICINE

## 2018-01-19 PROCEDURE — 36415 COLL VENOUS BLD VENIPUNCTURE: CPT | Performed by: INTERNAL MEDICINE

## 2018-01-19 RX ORDER — IBUPROFEN 200 MG
1 TABLET ORAL DAILY
Status: DISCONTINUED | OUTPATIENT
Start: 2018-01-20 | End: 2018-01-19

## 2018-01-19 RX ORDER — IBUPROFEN 200 MG
1 TABLET ORAL DAILY
Status: DISCONTINUED | OUTPATIENT
Start: 2018-01-19 | End: 2018-01-22 | Stop reason: HOSPADM

## 2018-01-19 RX ADMIN — LORAZEPAM 1 MG: 0.5 TABLET ORAL at 00:07

## 2018-01-19 RX ADMIN — TICAGRELOR 90 MG: 90 TABLET ORAL at 22:12

## 2018-01-19 RX ADMIN — LISINOPRIL 5 MG: 5 TABLET ORAL at 10:07

## 2018-01-19 RX ADMIN — CARVEDILOL 6.25 MG: 12.5 TABLET, FILM COATED ORAL at 08:52

## 2018-01-19 RX ADMIN — CARVEDILOL 12.5 MG: 12.5 TABLET, FILM COATED ORAL at 17:46

## 2018-01-19 RX ADMIN — ASPIRIN 81 MG 81 MG: 81 TABLET ORAL at 08:52

## 2018-01-19 RX ADMIN — Medication 10 ML: at 22:12

## 2018-01-19 RX ADMIN — TICAGRELOR 90 MG: 90 TABLET ORAL at 12:49

## 2018-01-19 RX ADMIN — LISINOPRIL 5 MG: 5 TABLET ORAL at 08:52

## 2018-01-19 RX ADMIN — ATORVASTATIN CALCIUM 80 MG: 40 TABLET, FILM COATED ORAL at 06:25

## 2018-01-19 RX ADMIN — Medication 10 ML: at 06:26

## 2018-01-19 RX ADMIN — TICAGRELOR 90 MG: 90 TABLET ORAL at 00:07

## 2018-01-19 RX ADMIN — ATORVASTATIN CALCIUM 80 MG: 40 TABLET, FILM COATED ORAL at 22:12

## 2018-01-19 RX ADMIN — CARVEDILOL 6.25 MG: 12.5 TABLET, FILM COATED ORAL at 10:07

## 2018-01-19 NOTE — PROGRESS NOTES
[facility] CARDIOLOGY PROGRESS NOTE           1/19/2018 7:30 AM    Admit Date: 1/18/2018      Subjective:   Patient is doing well. He has been stable overnight and no additional CP.    ROS:  Cardiovascular:  As noted above    Objective:      Vitals:    01/19/18 0403 01/19/18 0502 01/19/18 0600 01/19/18 0702   BP: 138/85 133/60  126/62   Pulse: 81 84  78   Resp: 12 25  9   Temp:  98.3 °F (36.8 °C)  98 °F (36.7 °C)   SpO2: 96% 98%  100%   Weight:   93.7 kg (206 lb 9.1 oz)    Height:           Physical Exam:  General-No Acute Distress  Neck- supple, no JVD  CV- regular rate and rhythm no MRG  Lung- clear bilaterally  Abd- soft, nontender, nondistended  Ext- no edema bilaterally. Skin- warm and dry    Data Review:   Recent Labs      01/19/18   0609  01/19/18   0000  01/18/18   1832   NA  140   --   139   K  3.9   --   3.7   BUN  8   --   12   CREA  0.70*   --   0.87   GLU  118*   --   160*   WBC  12.6*   --   15.0*   HGB  14.4   --   15.0   HCT  42.8   --   43.5   PLT  268   --   300   TROIQ  >40.00*  >40.00*  >40.00*   CHOL  216*   --    --    LDLC  137.2*   --    --    HDL  40   --    --        Assessment/Plan:     Principal Problem:    STEMI involving left anterior descending coronary artery ([address] (1/18/2018)    Patient with complex calcified LM/3vCAD with proximal LAD occlusion. He is s/p PCI and stenting of the pLAD with BMS x 2. IVUS confirms severe distal LM disease. He is on ASA and brilinta. Active Problems:    Coronary artery disease involving native coronary artery of native heart (1/18/2018)    Complex 3vCAD and will need staged CABG in 4 weeks.       Essential hypertension (1/18/2018)    This is improved with increased carvedilol and lisinopril      Dyslipidemia (1/18/2018)    On atorvastatin 80mg daily       Tobacco abuse (1/18/2018)    Cessation education      STEMI (ST elevation myocardial infarction) ([facility] (1/18/2018)    As above          Cb Zapata MD  1/19/2018 7:30 AM

## 2018-01-19 NOTE — PROGRESS NOTES
Dr. Phylicia Nelson ordered life vest and this was faxed over to Avelina Schaeffer and notified Elpidio Rodas of order.

## 2018-01-19 NOTE — CONSULTS
Cardiovascular Surgery Consult    Patient: Juan Carlos Ochoa MRN: [id number]  SSN: [id number]    YOB: 1960  Age: 59 y.o. Sex: male      Subjective:      Juan Carlos Ochoa is a 59 y.o. male smoker, who presents for consultation for Coronary Artery Bypass Grafting. He has \"heartburn\" on Wednesday, 1/17/18, and was admitted via EMS on Thursday with acute STEMI. Symptoms included diaphoresis, chest pain, non radiating. He admits to \"heartburn\" symptoms on the past year, taking Tylenol/ ASA for relief of symptoms. Patient really did not regularly see PCP; however, he reported a stress test over 5 years ago for dizziness, that was WNL. Cardiac catheterization lab ST elevation anterior myocardial infarction. EF 40%, Severe Multivessel Disease including left main. Dr. Michael Palacio has reviewed the images. Echo on admission showed EF 30 % to 35 %, akinesis of the apical inferior, apical septal, apical lateral, and apical wall(s), with grade III diastolic dysfunction (reviewed by Dr. Michael Palacio). Risk factors: CAD, Smoker, PAD with LLE claudication, acute CHF, untreated HLP, suspected HTN, Fhx premature CAD. Past Medical History:   Diagnosis Date    Acute diastolic CHF (congestive heart failure) ([address] 1/19/2018    Claudication of left lower extremity ([address] 1/19/2018    Coronary artery disease involving native coronary artery of native heart 1/18/2018    Dyslipidemia 1/18/2018    Essential hypertension 1/18/2018    Family history of premature CAD 1/19/2018    STEMI involving left anterior descending coronary artery ([address] 1/18/2018    Tobacco abuse 1/18/2018     No past surgical history on file.    Family History   Problem Relation Age of Onset    Coronary Artery Disease Mother      CABG Age 72    Coronary Artery Disease Brother      CAD/PTCA stent age 46s     Social History   Substance Use Topics    Smoking status: Current Every Day Smoker     Packs/day: 1.00     Years: 40.00    Smokeless tobacco: Not on file    Alcohol use Not on file      Current Facility-Administered Medications   Medication Dose Route Frequency    midazolam (VERSED) injection 0.5-2 mg  0.5-2 mg IntraVENous ONCE PRN    lidocaine (XYLOCAINE) 20 mg/mL (2 %) injection  mg  2-10 mL IntraDERMal Multiple    heparin (PF) 2 units/ml in NS infusion  3 Units/hr IntraarTERial CONTINUOUS    fentaNYL citrate (PF) injection 25-75 mcg  25-75 mcg IntraVENous Multiple    ondansetron (ZOFRAN) injection 4 mg  4 mg IntraVENous PRN    heparin (porcine) 10,000 unit/mL injection 2,000-9,000 Units  2,000-9,000 Units IntraVENous Multiple    eptifibatide (INTEGRILIN) 0.75 mg/mL infusion  2 mcg/kg/min IntraVENous CONTINUOUS    0.9% sodium chloride infusion  75 mL/hr IntraVENous CONTINUOUS    sodium chloride (NS) flush 5-10 mL  5-10 mL IntraVENous Q8H    sodium chloride (NS) flush 5-10 mL  5-10 mL IntraVENous PRN    acetaminophen (TYLENOL) tablet 650 mg  650 mg Oral Q4H PRN    morphine injection 2 mg  2 mg IntraVENous Q4H PRN    nitroglycerin (NITROSTAT) tablet 0.4 mg  0.4 mg SubLINGual Q5MIN PRN    aspirin chewable tablet 81 mg  81 mg Oral DAILY    LORazepam (ATIVAN) tablet 1 mg  1 mg Oral Q6H PRN    HYDROcodone-acetaminophen (NORCO) 5-325 mg per tablet 1 Tab  1 Tab Oral Q4H PRN    lisinopril (PRINIVIL, ZESTRIL) tablet 10 mg  10 mg Oral DAILY    atorvastatin (LIPITOR) tablet 80 mg  80 mg Oral QHS    ticagrelor (BRILINTA) tablet 90 mg  90 mg Oral Q12H    carvedilol (COREG) tablet 12.5 mg  12.5 mg Oral BIDPC    influenza vaccine 2017-18 (3 yrs+)(PF) (FLUZONE QUAD/FLUARIX QUAD) injection 0.5 mL  0.5 mL IntraMUSCular PRIOR TO DISCHARGE    hydrALAZINE (APRESOLINE) 20 mg/mL injection 10 mg  10 mg IntraVENous Q6H PRN      No Known Allergies    Review of Systems:  Constitutional:   Negative for fevers and unexplained weight loss. Eyes:   Negative for visual disturbance. ENT:   Negative for significant hearing loss and tinnitus.   Respiratory:   Negative for hemoptysis. Cardiovascular:   Positive for chest pain, diaphoresis. Negative except as noted in HPI. Gastrointestinal:   Negative for melena and abdominal pain. Genitourinary:   Negative for hematuria, renal stones. Integumentary:   Negative for rash or non-healing wounds  Hematologic/Lymphatic:   Negative for excessive bleeding hx or clotting disorder. Musculoskeletal:  Positive for LLE pain with walking. Negative for active, unexplained/severe joint pain. Neurological:   Negative for stroke/ TIA.    Behavioral/Psych:   Negative for suicidal ideations.    Endocrine:   Negative for uncontrolled diabetic symptoms including polyuria, polydipsia and poor wound healing. Objective:        Patient Vitals for the past 8 hrs:   BP Temp Pulse Resp SpO2 Weight   18 1207 116/63 97 °F (36.1 °C) 76 18 95 % -   18 1023 117/61 97.5 °F (36.4 °C) 82 18 97 % -   18 1002 110/58 - 82 26 - -   18 0902 118/57 - 80 8 - -   18 0855 104/55 - 85 15 - -   18 0802 100/59 - 81 17 - -   18 0702 126/62 98 °F (36.7 °C) 78 9 100 % -   18 0600 - - - - - 206 lb 9.1 oz (93.7 kg)     Temp (24hrs), Av.4 °F (36.9 °C), Min:97 °F (36.1 °C), Max:100.2 °F (37.9 °C)    Body mass index is 29.64 kg/(m^2). Physical Exam:  GENERAL: alert, cooperative, no distress, appears stated age, THROAT & NECK: normal and neck supple and symmetrical.  no carotid bruits, LUNG: clear to auscultation bilaterally, HEART: regular rate and rhythm, S1, S2 normal, no murmur, click, rub or gallop, ABDOMEN: soft, non-tender. Bowel sounds normal. No masses,  no organomegaly, EXTREMITIES:  Upper extremities normal, atraumatic, no cyanosis or edema, dressing to right radial, left radial and bilateral brachial palpable. Lower extremities normal, atraumatic, no cyanosis or edema, diminished TP/ PT pulses. NEUROLOGIC: AOx3. Reflexes and motor strength symmetric. Cranial nerves 2-12 grossly intact.     Assessment: Severe Multivessel Coronary Artery Disease with Acute Diastolic CHF, STEMI    Plan:     Dr. Sunil Mares to see patient in consultation today and provide recommendations. Preop education DVD viewed by patient and family. Questions answered. STS risk assessment approximately 0.6%.       Signed By: Vasquez Tiwari NP     January 19, 2018

## 2018-01-19 NOTE — PROGRESS NOTES
Lilli Estrada called and notified  that he received the order for a LV and is still awaiting insurance approval.  He will let us know once it is received.

## 2018-01-19 NOTE — PROGRESS NOTES
TRANSFER - IN REPORT:    Verbal report received from Shaunna RN(name) on Zena Mendez  being received from [facility](unit) for routine progression of care      Report consisted of patients Situation, Background, Assessment and   Recommendations(SBAR). Information from the following report(s) SBAR, Kardex, Procedure Summary, Intake/Output, Recent Results and Cardiac Rhythm NSR was reviewed with the receiving nurse. Opportunity for questions and clarification was provided. Assessment completed upon patients arrival to unit and care assumed. Dual skin assessment completed upon pt's arrival to unit, sacrum with no redness or breakdown noted. R radial cath site with gauze and tegaderm, no bleeding or hematoma noted. No other abnormalities.

## 2018-01-19 NOTE — PROGRESS NOTES
TRANSFER - OUT REPORT:    Verbal report given to Maame Saldana [id number] RN(name) on Toya Banegas  being transferred to [facility](unit) for routine progression of care       Report consisted of patients Situation, Background, Assessment and   Recommendations(SBAR). Information from the following report(s) SBAR, Kardex, ED Summary, Procedure Summary, Intake/Output, MAR, Recent Results, Med Rec Status and Cardiac Rhythm NSR was reviewed with the receiving nurse. Lines:   Peripheral IV 01/19/18 Left Forearm (Active)   Site Assessment Clean, dry, & intact 1/19/2018  9:17 AM   Phlebitis Assessment 0 1/19/2018  9:17 AM   Infiltration Assessment 0 1/19/2018  9:17 AM   Dressing Status Clean, dry, & intact 1/19/2018  9:17 AM   Dressing Type Transparent 1/19/2018  9:17 AM   Hub Color/Line Status Pink;Flushed;Patent 1/19/2018  9:17 AM   Action Taken Open ports on tubing capped 1/19/2018  9:17 AM        Opportunity for questions and clarification was provided.       Patient transported with:   JasonDB

## 2018-01-19 NOTE — PROGRESS NOTES
Spoke with Brenda Zapata NP made aware that Troponin came back >40 and SBP is in the 160's to 170's. Give Hydralazine Q6 hours for greater then 160.

## 2018-01-19 NOTE — PROGRESS NOTES
Duel skin assessment done. No skin breakdown or redness noted. Repeat troponin done and Brilinta given as ordered. Slightly anxious although pleasant, stated he really needed to smoke. Ativan given as ordered. Denies SOB, chest heaviness or squeezing. Instructed to call if he has any chest, pain even a little bit.

## 2018-01-20 PROBLEM — I35.0 NONRHEUMATIC AORTIC VALVE STENOSIS: Status: ACTIVE | Noted: 2018-01-20

## 2018-01-20 LAB
ANION GAP SERPL CALC-SCNC: 12 MMOL/L (ref 7–16)
ATRIAL RATE: 79 BPM
BASOPHILS # BLD: 0 K/UL (ref 0–0.2)
BASOPHILS NFR BLD: 0 % (ref 0–2)
BUN SERPL-MCNC: 17 MG/DL (ref 6–23)
CALCIUM SERPL-MCNC: 8.3 MG/DL (ref 8.3–10.4)
CALCULATED P AXIS, ECG09: 55 DEGREES
CALCULATED R AXIS, ECG10: 81 DEGREES
CALCULATED T AXIS, ECG11: -89 DEGREES
CHLORIDE SERPL-SCNC: 108 MMOL/L (ref 98–107)
CO2 SERPL-SCNC: 22 MMOL/L (ref 21–32)
CREAT SERPL-MCNC: 0.86 MG/DL (ref 0.8–1.5)
DIAGNOSIS, 93000: NORMAL
DIFFERENTIAL METHOD BLD: ABNORMAL
EOSINOPHIL # BLD: 0.2 K/UL (ref 0–0.8)
EOSINOPHIL NFR BLD: 2 % (ref 0.5–7.8)
ERYTHROCYTE [DISTWIDTH] IN BLOOD BY AUTOMATED COUNT: 13.5 % (ref 11.9–14.6)
GLUCOSE SERPL-MCNC: 104 MG/DL (ref 65–100)
HCT VFR BLD AUTO: 40.2 % (ref 41.1–50.3)
HGB BLD-MCNC: 13.4 G/DL (ref 13.6–17.2)
IMM GRANULOCYTES # BLD: 0 K/UL (ref 0–0.5)
IMM GRANULOCYTES NFR BLD AUTO: 0 % (ref 0–5)
LYMPHOCYTES # BLD: 2.7 K/UL (ref 0.5–4.6)
LYMPHOCYTES NFR BLD: 26 % (ref 13–44)
MCH RBC QN AUTO: 29.8 PG (ref 26.1–32.9)
MCHC RBC AUTO-ENTMCNC: 33.3 G/DL (ref 31.4–35)
MCV RBC AUTO: 89.3 FL (ref 79.6–97.8)
MONOCYTES # BLD: 0.8 K/UL (ref 0.1–1.3)
MONOCYTES NFR BLD: 8 % (ref 4–12)
NEUTS SEG # BLD: 6.7 K/UL (ref 1.7–8.2)
NEUTS SEG NFR BLD: 64 % (ref 43–78)
P-R INTERVAL, ECG05: 142 MS
PLATELET # BLD AUTO: 260 K/UL (ref 150–450)
PMV BLD AUTO: 10.4 FL (ref 10.8–14.1)
POTASSIUM SERPL-SCNC: 4.1 MMOL/L (ref 3.5–5.1)
Q-T INTERVAL, ECG07: 452 MS
QRS DURATION, ECG06: 132 MS
QTC CALCULATION (BEZET), ECG08: 518 MS
RBC # BLD AUTO: 4.5 M/UL (ref 4.23–5.67)
SODIUM SERPL-SCNC: 142 MMOL/L (ref 136–145)
VENTRICULAR RATE, ECG03: 79 BPM
WBC # BLD AUTO: 10.5 K/UL (ref 4.3–11.1)

## 2018-01-20 PROCEDURE — 74011250637 HC RX REV CODE- 250/637: Performed by: INTERNAL MEDICINE

## 2018-01-20 PROCEDURE — 74011250636 HC RX REV CODE- 250/636: Performed by: INTERNAL MEDICINE

## 2018-01-20 PROCEDURE — 36415 COLL VENOUS BLD VENIPUNCTURE: CPT | Performed by: INTERNAL MEDICINE

## 2018-01-20 PROCEDURE — 74011250637 HC RX REV CODE- 250/637: Performed by: NURSE PRACTITIONER

## 2018-01-20 PROCEDURE — 65660000000 HC RM CCU STEPDOWN

## 2018-01-20 PROCEDURE — 80048 BASIC METABOLIC PNL TOTAL CA: CPT | Performed by: INTERNAL MEDICINE

## 2018-01-20 PROCEDURE — 85025 COMPLETE CBC W/AUTO DIFF WBC: CPT | Performed by: INTERNAL MEDICINE

## 2018-01-20 PROCEDURE — 93005 ELECTROCARDIOGRAM TRACING: CPT | Performed by: INTERNAL MEDICINE

## 2018-01-20 RX ORDER — ENOXAPARIN SODIUM 100 MG/ML
40 INJECTION SUBCUTANEOUS EVERY 24 HOURS
Status: DISCONTINUED | OUTPATIENT
Start: 2018-01-20 | End: 2018-01-22 | Stop reason: HOSPADM

## 2018-01-20 RX ADMIN — ATORVASTATIN CALCIUM 80 MG: 40 TABLET, FILM COATED ORAL at 21:40

## 2018-01-20 RX ADMIN — CARVEDILOL 12.5 MG: 12.5 TABLET, FILM COATED ORAL at 17:03

## 2018-01-20 RX ADMIN — Medication 10 ML: at 21:40

## 2018-01-20 RX ADMIN — TICAGRELOR 90 MG: 90 TABLET ORAL at 11:37

## 2018-01-20 RX ADMIN — ACETAMINOPHEN 650 MG: 325 TABLET ORAL at 14:56

## 2018-01-20 RX ADMIN — Medication 10 ML: at 05:05

## 2018-01-20 RX ADMIN — ASPIRIN 81 MG 81 MG: 81 TABLET ORAL at 08:49

## 2018-01-20 RX ADMIN — ENOXAPARIN SODIUM 40 MG: 40 INJECTION SUBCUTANEOUS at 08:49

## 2018-01-20 RX ADMIN — TICAGRELOR 90 MG: 90 TABLET ORAL at 21:39

## 2018-01-20 RX ADMIN — CARVEDILOL 12.5 MG: 12.5 TABLET, FILM COATED ORAL at 08:49

## 2018-01-20 RX ADMIN — LISINOPRIL 10 MG: 5 TABLET ORAL at 08:48

## 2018-01-20 NOTE — PROGRESS NOTES
[facility] CARDIOLOGY PROGRESS NOTE           1/20/2018 6:34 AM    Admit Date: 1/18/2018      Subjective:   No cp or inc sob    ROS:  Cardiovascular:  As noted above    Objective:      Vitals:    01/19/18 1930 01/19/18 2232 01/20/18 0311 01/20/18 0314   BP: 128/57 108/56 103/52    Pulse: 88 93 82    Resp: 18 18 18    Temp: 97.8 °F (36.6 °C) 96.1 °F (35.6 °C) 97.2 °F (36.2 °C)    SpO2: 94% 97% 98%    Weight:    93.5 kg (206 lb 3.2 oz)   Height:           Physical Exam:  General-No Acute Distress  Neck- supple, no JVD  CV- regular rate and rhythm, + as murmur w/ carotid radiation  Lung- clear bilaterally  Abd- soft, nontender, nondistended  Ext- no edema bilaterally. Skin- warm and dry    Data Review:   Recent Labs      01/20/18   0530  01/19/18   0609  01/19/18   0000  01/18/18   1832   NA   --   140   --   139   K   --   3.9   --   3.7   BUN   --   8   --   12   CREA   --   0.70*   --   0.87   GLU   --   118*   --   160*   WBC  10.5  12.6*   --   15.0*   HGB  13.4*  14.4   --   15.0   HCT  40.2*  42.8   --   43.5   PLT  260  268   --   300   TROIQ   --   >40.00*  >40.00*  >40.00*   CHOL   --   216*   --    --    LDLC   --   137.2*   --    --    HDL   --   40   --    --        Assessment/Plan:     Principal Problem:    STEMI involving left anterior descending coronary artery ([facility] (1/18/2018)        Active Problems:    Coronary artery disease involving native coronary artery of native heart (1/18/2018)          Essential hypertension (1/18/2018)          Dyslipidemia (1/18/2018)          Tobacco abuse (1/18/2018)          STEMI (ST elevation myocardial infarction) ([facility] (1/18/2018)          Family history of premature CAD (1/19/2018)          Claudication of left lower extremity ([facility] (1/19/2018)          Acute diastolic CHF (congestive heart failure) ([facility] (1/19/2018)      ///    Remains stable, d/c ivf, add Lovenox 40, close observation.  Aortic stenosis is added to the list.        Oziel Nietos Dhruv Herrera MD  1/20/2018 6:34 AM

## 2018-01-21 LAB
ANION GAP SERPL CALC-SCNC: 9 MMOL/L (ref 7–16)
ATRIAL RATE: 76 BPM
BASOPHILS # BLD: 0 K/UL (ref 0–0.2)
BASOPHILS NFR BLD: 0 % (ref 0–2)
BUN SERPL-MCNC: 15 MG/DL (ref 6–23)
CALCIUM SERPL-MCNC: 8.4 MG/DL (ref 8.3–10.4)
CALCULATED P AXIS, ECG09: 63 DEGREES
CALCULATED R AXIS, ECG10: 88 DEGREES
CALCULATED T AXIS, ECG11: 26 DEGREES
CHLORIDE SERPL-SCNC: 109 MMOL/L (ref 98–107)
CO2 SERPL-SCNC: 22 MMOL/L (ref 21–32)
CREAT SERPL-MCNC: 0.74 MG/DL (ref 0.8–1.5)
DIAGNOSIS, 93000: NORMAL
DIFFERENTIAL METHOD BLD: ABNORMAL
EOSINOPHIL # BLD: 0.2 K/UL (ref 0–0.8)
EOSINOPHIL NFR BLD: 2 % (ref 0.5–7.8)
ERYTHROCYTE [DISTWIDTH] IN BLOOD BY AUTOMATED COUNT: 13.5 % (ref 11.9–14.6)
GLUCOSE SERPL-MCNC: 103 MG/DL (ref 65–100)
HCT VFR BLD AUTO: 39.1 % (ref 41.1–50.3)
HGB BLD-MCNC: 12.9 G/DL (ref 13.6–17.2)
IMM GRANULOCYTES # BLD: 0 K/UL (ref 0–0.5)
IMM GRANULOCYTES NFR BLD AUTO: 0 % (ref 0–5)
LYMPHOCYTES # BLD: 2.8 K/UL (ref 0.5–4.6)
LYMPHOCYTES NFR BLD: 28 % (ref 13–44)
MCH RBC QN AUTO: 29.5 PG (ref 26.1–32.9)
MCHC RBC AUTO-ENTMCNC: 33 G/DL (ref 31.4–35)
MCV RBC AUTO: 89.5 FL (ref 79.6–97.8)
MONOCYTES # BLD: 0.9 K/UL (ref 0.1–1.3)
MONOCYTES NFR BLD: 9 % (ref 4–12)
NEUTS SEG # BLD: 5.9 K/UL (ref 1.7–8.2)
NEUTS SEG NFR BLD: 61 % (ref 43–78)
P-R INTERVAL, ECG05: 148 MS
PLATELET # BLD AUTO: 246 K/UL (ref 150–450)
PMV BLD AUTO: 10.5 FL (ref 10.8–14.1)
POTASSIUM SERPL-SCNC: 4.2 MMOL/L (ref 3.5–5.1)
Q-T INTERVAL, ECG07: 408 MS
QRS DURATION, ECG06: 130 MS
QTC CALCULATION (BEZET), ECG08: 459 MS
RBC # BLD AUTO: 4.37 M/UL (ref 4.23–5.67)
SODIUM SERPL-SCNC: 140 MMOL/L (ref 136–145)
VENTRICULAR RATE, ECG03: 76 BPM
WBC # BLD AUTO: 9.9 K/UL (ref 4.3–11.1)

## 2018-01-21 PROCEDURE — 36592 COLLECT BLOOD FROM PICC: CPT

## 2018-01-21 PROCEDURE — 74011250637 HC RX REV CODE- 250/637: Performed by: INTERNAL MEDICINE

## 2018-01-21 PROCEDURE — 93005 ELECTROCARDIOGRAM TRACING: CPT | Performed by: INTERNAL MEDICINE

## 2018-01-21 PROCEDURE — 74011250636 HC RX REV CODE- 250/636: Performed by: INTERNAL MEDICINE

## 2018-01-21 PROCEDURE — 74011250637 HC RX REV CODE- 250/637: Performed by: NURSE PRACTITIONER

## 2018-01-21 PROCEDURE — 36415 COLL VENOUS BLD VENIPUNCTURE: CPT | Performed by: INTERNAL MEDICINE

## 2018-01-21 PROCEDURE — 85025 COMPLETE CBC W/AUTO DIFF WBC: CPT | Performed by: INTERNAL MEDICINE

## 2018-01-21 PROCEDURE — 65660000000 HC RM CCU STEPDOWN

## 2018-01-21 PROCEDURE — 80048 BASIC METABOLIC PNL TOTAL CA: CPT | Performed by: INTERNAL MEDICINE

## 2018-01-21 RX ADMIN — TICAGRELOR 90 MG: 90 TABLET ORAL at 21:12

## 2018-01-21 RX ADMIN — ENOXAPARIN SODIUM 40 MG: 40 INJECTION SUBCUTANEOUS at 08:43

## 2018-01-21 RX ADMIN — ASPIRIN 81 MG 81 MG: 81 TABLET ORAL at 08:42

## 2018-01-21 RX ADMIN — CARVEDILOL 12.5 MG: 12.5 TABLET, FILM COATED ORAL at 17:23

## 2018-01-21 RX ADMIN — Medication 10 ML: at 05:35

## 2018-01-21 RX ADMIN — LISINOPRIL 10 MG: 5 TABLET ORAL at 08:42

## 2018-01-21 RX ADMIN — Medication 10 ML: at 21:14

## 2018-01-21 RX ADMIN — CARVEDILOL 12.5 MG: 12.5 TABLET, FILM COATED ORAL at 08:42

## 2018-01-21 RX ADMIN — ATORVASTATIN CALCIUM 80 MG: 40 TABLET, FILM COATED ORAL at 21:13

## 2018-01-21 RX ADMIN — TICAGRELOR 90 MG: 90 TABLET ORAL at 11:44

## 2018-01-21 NOTE — PROGRESS NOTES
Bedside and Verbal shift change report received from Anson Cota, [facility].

## 2018-01-22 VITALS
BODY MASS INDEX: 28.55 KG/M2 | OXYGEN SATURATION: 98 % | DIASTOLIC BLOOD PRESSURE: 67 MMHG | RESPIRATION RATE: 20 BRPM | WEIGHT: 199.4 LBS | TEMPERATURE: 97 F | HEIGHT: 70 IN | SYSTOLIC BLOOD PRESSURE: 125 MMHG | HEART RATE: 73 BPM

## 2018-01-22 LAB
ANION GAP SERPL CALC-SCNC: 9 MMOL/L (ref 7–16)
ATRIAL RATE: 75 BPM
BASOPHILS # BLD: 0 K/UL (ref 0–0.2)
BASOPHILS NFR BLD: 0 % (ref 0–2)
BUN SERPL-MCNC: 13 MG/DL (ref 6–23)
CALCIUM SERPL-MCNC: 8.5 MG/DL (ref 8.3–10.4)
CALCULATED P AXIS, ECG09: 56 DEGREES
CALCULATED R AXIS, ECG10: 77 DEGREES
CALCULATED T AXIS, ECG11: 59 DEGREES
CHLORIDE SERPL-SCNC: 108 MMOL/L (ref 98–107)
CO2 SERPL-SCNC: 21 MMOL/L (ref 21–32)
CREAT SERPL-MCNC: 0.85 MG/DL (ref 0.8–1.5)
DIAGNOSIS, 93000: NORMAL
DIFFERENTIAL METHOD BLD: ABNORMAL
EOSINOPHIL # BLD: 0.2 K/UL (ref 0–0.8)
EOSINOPHIL NFR BLD: 2 % (ref 0.5–7.8)
ERYTHROCYTE [DISTWIDTH] IN BLOOD BY AUTOMATED COUNT: 13.6 % (ref 11.9–14.6)
GLUCOSE SERPL-MCNC: 101 MG/DL (ref 65–100)
HCT VFR BLD AUTO: 40 % (ref 41.1–50.3)
HGB BLD-MCNC: 13.1 G/DL (ref 13.6–17.2)
IMM GRANULOCYTES # BLD: 0 K/UL (ref 0–0.5)
IMM GRANULOCYTES NFR BLD AUTO: 0 % (ref 0–5)
LYMPHOCYTES # BLD: 2.3 K/UL (ref 0.5–4.6)
LYMPHOCYTES NFR BLD: 21 % (ref 13–44)
MCH RBC QN AUTO: 29.4 PG (ref 26.1–32.9)
MCHC RBC AUTO-ENTMCNC: 32.8 G/DL (ref 31.4–35)
MCV RBC AUTO: 89.7 FL (ref 79.6–97.8)
MONOCYTES # BLD: 1 K/UL (ref 0.1–1.3)
MONOCYTES NFR BLD: 9 % (ref 4–12)
NEUTS SEG # BLD: 7.4 K/UL (ref 1.7–8.2)
NEUTS SEG NFR BLD: 68 % (ref 43–78)
P-R INTERVAL, ECG05: 146 MS
PLATELET # BLD AUTO: 260 K/UL (ref 150–450)
PMV BLD AUTO: 10.9 FL (ref 10.8–14.1)
POTASSIUM SERPL-SCNC: 4.3 MMOL/L (ref 3.5–5.1)
Q-T INTERVAL, ECG07: 392 MS
QRS DURATION, ECG06: 138 MS
QTC CALCULATION (BEZET), ECG08: 437 MS
RBC # BLD AUTO: 4.46 M/UL (ref 4.23–5.67)
SODIUM SERPL-SCNC: 138 MMOL/L (ref 136–145)
VENTRICULAR RATE, ECG03: 75 BPM
WBC # BLD AUTO: 10.9 K/UL (ref 4.3–11.1)

## 2018-01-22 PROCEDURE — 80048 BASIC METABOLIC PNL TOTAL CA: CPT | Performed by: INTERNAL MEDICINE

## 2018-01-22 PROCEDURE — 74011250636 HC RX REV CODE- 250/636: Performed by: INTERNAL MEDICINE

## 2018-01-22 PROCEDURE — 74011250637 HC RX REV CODE- 250/637: Performed by: INTERNAL MEDICINE

## 2018-01-22 PROCEDURE — 85025 COMPLETE CBC W/AUTO DIFF WBC: CPT | Performed by: INTERNAL MEDICINE

## 2018-01-22 PROCEDURE — 93005 ELECTROCARDIOGRAM TRACING: CPT | Performed by: INTERNAL MEDICINE

## 2018-01-22 PROCEDURE — 90471 IMMUNIZATION ADMIN: CPT

## 2018-01-22 PROCEDURE — 90686 IIV4 VACC NO PRSV 0.5 ML IM: CPT | Performed by: INTERNAL MEDICINE

## 2018-01-22 PROCEDURE — 36415 COLL VENOUS BLD VENIPUNCTURE: CPT | Performed by: INTERNAL MEDICINE

## 2018-01-22 PROCEDURE — 36592 COLLECT BLOOD FROM PICC: CPT

## 2018-01-22 RX ORDER — ATORVASTATIN CALCIUM 80 MG/1
80 TABLET, FILM COATED ORAL
Qty: 30 TAB | Refills: 11 | Status: SHIPPED | OUTPATIENT
Start: 2018-01-22 | End: 2019-01-18 | Stop reason: SDUPTHER

## 2018-01-22 RX ORDER — LISINOPRIL 10 MG/1
10 TABLET ORAL DAILY
Qty: 30 TAB | Refills: 11 | Status: SHIPPED | OUTPATIENT
Start: 2018-01-23 | End: 2018-03-11

## 2018-01-22 RX ORDER — CARVEDILOL 12.5 MG/1
12.5 TABLET ORAL
Qty: 60 TAB | Refills: 11 | Status: SHIPPED | OUTPATIENT
Start: 2018-01-22 | End: 2018-03-11

## 2018-01-22 RX ORDER — GUAIFENESIN 100 MG/5ML
325 LIQUID (ML) ORAL DAILY
Status: SHIPPED | COMMUNITY
Start: 2018-01-23 | End: 2019-08-27 | Stop reason: SDUPTHER

## 2018-01-22 RX ORDER — NITROGLYCERIN 0.4 MG/1
0.4 TABLET SUBLINGUAL
Qty: 1 BOTTLE | Refills: 5 | Status: SHIPPED | OUTPATIENT
Start: 2018-01-22 | End: 2021-09-28 | Stop reason: SDUPTHER

## 2018-01-22 RX ADMIN — INFLUENZA VIRUS VACCINE 0.5 ML: 15; 15; 15; 15 SUSPENSION INTRAMUSCULAR at 15:02

## 2018-01-22 RX ADMIN — ENOXAPARIN SODIUM 40 MG: 40 INJECTION SUBCUTANEOUS at 09:51

## 2018-01-22 RX ADMIN — CARVEDILOL 12.5 MG: 12.5 TABLET, FILM COATED ORAL at 09:50

## 2018-01-22 RX ADMIN — Medication 10 ML: at 05:16

## 2018-01-22 RX ADMIN — TICAGRELOR 90 MG: 90 TABLET ORAL at 14:52

## 2018-01-22 RX ADMIN — ASPIRIN 81 MG 81 MG: 81 TABLET ORAL at 09:51

## 2018-01-22 RX ADMIN — CARVEDILOL 12.5 MG: 12.5 TABLET, FILM COATED ORAL at 18:15

## 2018-01-22 RX ADMIN — LISINOPRIL 10 MG: 5 TABLET ORAL at 09:51

## 2018-01-22 RX ADMIN — Medication 10 ML: at 14:52

## 2018-01-22 NOTE — PROGRESS NOTES
Discharge instructions discussed with patient to include prescriptions and follow up appointments. ..verbalized ubnderstanding.

## 2018-01-22 NOTE — PROGRESS NOTES
JOEL contacted Lenin Stout this morning about LV status. He provided reference # [id number] for Corinne [place] phone # of [phone], and asked if our DrFarhana could call to expedite the approval.  Pt's [employer] ID is [id number]. JOEL told Dr. Jessica Menchaca about the request to call to expedite and he asked SW to call. JOEL did so and spoke with Benton Cortez who said they just received the order this morning at 9:20 a.m. She said she personally received it and spoke with Tiffanie JAFFE from [facility] and instructed her to list the order as urgent so it could be expedited but she didn't. Hill Cheng said she has already listed it so it could be expedited. She will have it assigned and have the RN to call JOEL back. CM will continue to follow.

## 2018-01-22 NOTE — PROGRESS NOTES
Kevin notified JOEL that they were approved and would be here by [phone]. JOEL notified RN of this. At this time, CM is signing off but remains available to assist if needed.

## 2018-01-22 NOTE — PROGRESS NOTES
IP consult to Cardiac Rehab. Patient had STEMI/PCI 1/18/18. Needs CABG in four weeks. Will discuss CR with patient when he no longer requires medical intervention for CAD.

## 2018-01-22 NOTE — PROGRESS NOTES
[facility] CARDIOLOGY PROGRESS NOTE           1/22/2018 12:31 PM    Admit Date: 1/18/2018      Subjective:   Patient denies any chest pain or dyspnea. Awaiting Lifevest approval.      ROS:  Cardiovascular:  As noted above    Objective:      Vitals:    01/21/18 2222 01/22/18 0311 01/22/18 0730 01/22/18 1146   BP: 114/64 100/58 109/67 126/68   Pulse: 75 75 78 74   Resp: 18 18 20 20   Temp: 97.2 °F (36.2 °C) 97.7 °F (36.5 °C) 97 °F (36.1 °C) 97 °F (36.1 °C)   SpO2: 94% 95% 99% 96%   Weight:  90.4 kg (199 lb 6.4 oz)     Height:           Physical Exam:  General-No Acute Distress  Neck- supple, no JVD  CV- regular rate and rhythm no MRG  Lung- clear bilaterally  Abd- soft, nontender, nondistended  Ext- no edema bilaterally. Skin- warm and dry      Data Review:   Recent Labs      01/22/18   0542  01/21/18   0431   NA  138  140   K  4.3  4.2   BUN  13  15   CREA  0.85  0.74*   GLU  101*  103*   WBC  10.9  9.9   HGB  13.1*  12.9*   HCT  40.0*  39.1*   PLT  260  246      No results found for: Steph Noland, TROPT, TNIPOC    Echo (1/18/18):  -  Left ventricle: Systolic function was severely reduced. Ejection fraction was estimated in the range of 30 % to 35 %. There was akinesis of the apical inferior, apical septal, apical lateral, and apical wall(s). -  Aortic valve: SVi=34.74 and Di=0.40. There was moderate stenosis. There was mild regurgitation. The aortic valve area by the continuity equation was 1.27cm2.  -  Mitral valve: There was mild regurgitation. -  Tricuspid valve: There was mild to moderate regurgitation. Assessment/Plan:     Principal Problem:    STEMI involving left anterior descending coronary artery ([address] (1/18/2018)    S/P PCI. On ASA and Brilinta. REsidual 3 vessel disease. Plan for CABG 4-6 weeks. Active Problems:    Coronary artery disease involving native coronary artery of native heart (1/18/2018)    See above. Essential hypertension (1/18/2018)    BP controlled. Acute systolic CHF (congestive heart failure) ([facility] (1/19/2018)    Compensated currently. EF 30-35%. On Coreg and lisinopril. Await life vest prior to discharge. Nonrheumatic aortic valve stenosis (1/20/2018)    Moderate aortic stenosis. Will need AVR with CABG.              Alvarez Lara MD  1/22/2018 12:31 PM

## 2018-01-22 NOTE — DISCHARGE SUMMARY
[facility] Cardiology Discharge Summary     Patient ID:  Tom Echeverria  [id number]  80 y.o.  1960    Admit date: 1/18/2018    Discharge date:  01/22/2018    Admitting Physician: Hardy Munoz MD     Discharge Physician: Mauri Mercado NP/Dr. Aguayo    Admission Diagnoses: STEMI (ST elevation myocardial infarction) [facility])    Discharge Diagnoses:    Diagnosis    Nonrheumatic aortic valve stenosis    Family history of premature CAD    [facility] left lower extremity [facility]    Acute diastolic CHF (congestive heart failure) ([facility]    STEMI involving left anterior descending coronary artery ([facility]    Coronary artery disease involving native coronary artery of native heart    Essential hypertension    Dyslipidemia    Tobacco abuse    STEMI (ST elevation myocardial infarction) [facility])       Cardiology Procedures this admission:  Left heart catheterization with PCI  EchoCardiogram  Consults: Cardiac Surgery    Hospital Course: Patient presented to the ER with c/o chest pain with EKG showing anterior ST elevation. Patient was taken to CCL for emergent LHC. Initial troponin was >40. Patient underwent cardiac catheterization by Dr. Shaun Hamilton. Patient was found to have 100% occlusion of the pLAD (culprit lesion)  that was stented with an Integrity BMS x 2, 2.5x16 and 2.5x26 with 0% residual stenosis. Patient was also found to have LM 80% stenosis, LCx 90%, RCA 90%. CV surgery was consulted and patient will undergo CABG in 2-4 weeks. Patient tolerated the procedure well and was taken to the CVICU for recovery. He remained hemodynamically stable with out further chest pain and was transferred to [facility]. Echo Results:      Left ventricle: Systolic function was severely reduced. Ejection fraction  was estimated in the range of 30 % to 35 %. There was akinesis of the apical  inferior, apical septal, apical lateral, and apical wall(s). -  Aortic valve: SVi=34.74 and Di=0.40.  There was moderate stenosis. There   Was mild regurgitation. The aortic valve area by the continuity equation was 1.27  cm2.  -  Mitral valve: There was mild regurgitation. -  Tricuspid valve: There was mild to moderate regurgitation. The eveningof discharge, patient was up feeling well without any complaints of chest pain or shortness of breath. Patient's right radia cath site was clean, dry and intact without hematoma or bruit. Patient's labs were WNL. Patient was seen and examined by Dr. Rica Nielson and determined stable and ready for discharge. Patient was instructed on the importance of medication compliance including taking aspirin and Brilinta everyday without missing a dose. For maximized medical therapy for CAD, patient will continue BB, ACE-I, and statin as well. The patient will follow up with [facility] as TC7, office will call with appointment. Patient has been referred to cardiac rehab, which he will attend post CABG. Given low EF he was fitted for a life vest prior to discharge. DISPOSITION: The patient is being discharged home in stable condition on a low saturated fat, low cholesterol and low salt diet. The patient is instructed to advance activities as tolerated to the limit of fatigue or shortness of breath. The patient is instructed to avoid all heavy lifting, straining, stooping or squatting for 3-5 days. The patient is instructed to watch the cath site for bleeding/oozing; if seen, the patient is instructed to apply firm pressure with a clean cloth and call [facility]iology at [phone]. The patient is instructed to watch for signs of infection which include: increasing area of redness, fever/hot to touch or purulent drainage at the catheterization site. The patient is instructed not to soak in a bathtub for 7-10 days, but is cleared to shower.  The patient is instructed to call the office or return to the ER for immediate evaluation for any shortness of breath or chest pain not relieved by NTG. Discharge Exam:   Visit Vitals    /67    Pulse 73    Temp 97 °F (36.1 °C)    Resp 20    Ht 5' 10\" (1.778 m)    Wt 90.4 kg (199 lb 6.4 oz)    SpO2 98%    BMI 28.61 kg/m2     Patient has been seen by Dr. Jami Bass: see his progress note for exam details. Recent Results (from the past 24 hour(s))   METABOLIC PANEL, BASIC    Collection Time: 01/22/18  5:42 AM   Result Value Ref Range    Sodium 138 136 - 145 mmol/L    Potassium 4.3 3.5 - 5.1 mmol/L    Chloride 108 (H) 98 - 107 mmol/L    CO2 21 21 - 32 mmol/L    Anion gap 9 7 - 16 mmol/L    Glucose 101 (H) 65 - 100 mg/dL    BUN 13 6 - 23 MG/DL    Creatinine 0.85 0.8 - 1.5 MG/DL    GFR est AA >60 >60 ml/min/1.73m2    GFR est non-AA >60 >60 ml/min/1.73m2    Calcium 8.5 8.3 - 10.4 MG/DL   CBC WITH AUTOMATED DIFF    Collection Time: 01/22/18  5:42 AM   Result Value Ref Range    WBC 10.9 4.3 - 11.1 K/uL    RBC 4.46 4.23 - 5.67 M/uL    HGB 13.1 (L) 13.6 - 17.2 g/dL    HCT 40.0 (L) 41.1 - 50.3 %    MCV 89.7 79.6 - 97.8 FL    MCH 29.4 26.1 - 32.9 PG    MCHC 32.8 31.4 - 35.0 g/dL    RDW 13.6 11.9 - 14.6 %    PLATELET 702 278 - 451 K/uL    MPV 10.9 10.8 - 14.1 FL    DF AUTOMATED      NEUTROPHILS 68 43 - 78 %    LYMPHOCYTES 21 13 - 44 %    MONOCYTES 9 4.0 - 12.0 %    EOSINOPHILS 2 0.5 - 7.8 %    BASOPHILS 0 0.0 - 2.0 %    IMMATURE GRANULOCYTES 0 0.0 - 5.0 %    ABS. NEUTROPHILS 7.4 1.7 - 8.2 K/UL    ABS. LYMPHOCYTES 2.3 0.5 - 4.6 K/UL    ABS. MONOCYTES 1.0 0.1 - 1.3 K/UL    ABS. EOSINOPHILS 0.2 0.0 - 0.8 K/UL    ABS. BASOPHILS 0.0 0.0 - 0.2 K/UL    ABS. IMM.  GRANS. 0.0 0.0 - 0.5 K/UL   EKG, 12 LEAD, INITIAL    Collection Time: 01/22/18  6:27 AM   Result Value Ref Range    Ventricular Rate 75 BPM    Atrial Rate 75 BPM    P-R Interval 146 ms    QRS Duration 138 ms    Q-T Interval 392 ms    QTC Calculation (Bezet) 437 ms    Calculated P Axis 56 degrees    Calculated R Axis 77 degrees    Calculated T Axis 59 degrees    Diagnosis       Normal sinus rhythm  Right bundle branch block  Septal infarct (cited on or before 19-JAN-2018)  T wave abnormality, consider lateral ischemia  Abnormal ECG  When compared with ECG of 21-JAN-2018 07:34,  T wave inversion no longer evident in Inferior leads  Confirmed by Tiffany Mccain ([id number]) on 1/22/2018 7:32:34 AM           Patient Instructions:     Current Discharge Medication List      START taking these medications    Details   carvedilol (COREG) 12.5 mg tablet Take 1 Tab by mouth two (2) times daily (after meals). Qty: 60 Tab, Refills: 11      atorvastatin (LIPITOR) 80 mg tablet Take 1 Tab by mouth nightly. Qty: 30 Tab, Refills: 11      lisinopril (PRINIVIL, ZESTRIL) 10 mg tablet Take 1 Tab by mouth daily. Qty: 30 Tab, Refills: 11      aspirin 81 mg chewable tablet Take 1 Tab by mouth daily. ticagrelor (BRILINTA) 90 mg tablet Take 1 Tab by mouth every twelve (12) hours every twelve (12) hours. Qty: 60 Tab, Refills: 11      nitroglycerin (NITROSTAT) 0.4 mg SL tablet 1 Tab by SubLINGual route every five (5) minutes as needed for Chest Pain. Qty: 1 Bottle, Refills: 5               Signed:  Karma Almaguer NP  1/22/2018  4:30 PM      [facility] CARDIOLOGY     1/22/2018     8:28 PM    I have personally seen and examined Anjali Arroyo with Bradleygabriella Leroy  NP. I confirm findings and plan as outlined in discharge summary. Have reviewed and discussed discharge medication, diet and instructions with patient. Patient to follow up as directed and contact our office with any questions.        Jemal Mora MD

## 2018-03-05 ENCOUNTER — ANESTHESIA EVENT (OUTPATIENT)
Dept: SURGERY | Age: 58
DRG: 220 | End: 2018-03-05
Payer: COMMERCIAL

## 2018-03-05 ENCOUNTER — HOSPITAL ENCOUNTER (OUTPATIENT)
Dept: SURGERY | Age: 58
Discharge: HOME | End: 2018-03-05
Payer: COMMERCIAL

## 2018-03-05 ENCOUNTER — HOSPITAL ENCOUNTER (OUTPATIENT)
Dept: GENERAL RADIOLOGY | Age: 58
Discharge: HOME | End: 2018-03-05
Attending: THORACIC SURGERY (CARDIOTHORACIC VASCULAR SURGERY)
Payer: COMMERCIAL

## 2018-03-05 ENCOUNTER — HOSPITAL ENCOUNTER (OUTPATIENT)
Dept: ULTRASOUND IMAGING | Age: 58
Discharge: HOME | End: 2018-03-05
Attending: THORACIC SURGERY (CARDIOTHORACIC VASCULAR SURGERY)
Payer: COMMERCIAL

## 2018-03-05 VITALS
RESPIRATION RATE: 20 BRPM | TEMPERATURE: 97.7 F | BODY MASS INDEX: 31.07 KG/M2 | SYSTOLIC BLOOD PRESSURE: 113 MMHG | WEIGHT: 217 LBS | HEART RATE: 75 BPM | OXYGEN SATURATION: 95 % | HEIGHT: 70 IN | DIASTOLIC BLOOD PRESSURE: 58 MMHG

## 2018-03-05 LAB
ALBUMIN SERPL-MCNC: 3.9 G/DL (ref 3.5–5)
ANION GAP SERPL CALC-SCNC: 7 MMOL/L (ref 7–16)
APPEARANCE UR: CLEAR
APTT PPP: 31.5 SEC (ref 23.2–35.3)
ATRIAL RATE: 62 BPM
BACTERIA SPEC CULT: NORMAL
BILIRUB SERPL-MCNC: 0.4 MG/DL (ref 0.2–1.1)
BILIRUB UR QL: NEGATIVE
BUN SERPL-MCNC: 18 MG/DL (ref 6–23)
CALCIUM SERPL-MCNC: 8.9 MG/DL (ref 8.3–10.4)
CALCULATED P AXIS, ECG09: 44 DEGREES
CALCULATED R AXIS, ECG10: 34 DEGREES
CALCULATED T AXIS, ECG11: 44 DEGREES
CHLORIDE SERPL-SCNC: 106 MMOL/L (ref 98–107)
CO2 SERPL-SCNC: 28 MMOL/L (ref 21–32)
COLOR UR: YELLOW
CREAT SERPL-MCNC: 0.91 MG/DL (ref 0.8–1.5)
DIAGNOSIS, 93000: NORMAL
ERYTHROCYTE [DISTWIDTH] IN BLOOD BY AUTOMATED COUNT: 13.4 % (ref 11.9–14.6)
EST. AVERAGE GLUCOSE BLD GHB EST-MCNC: 126 MG/DL
GLUCOSE SERPL-MCNC: 103 MG/DL (ref 65–100)
GLUCOSE UR STRIP.AUTO-MCNC: NEGATIVE MG/DL
HBA1C MFR BLD: 6 % (ref 4.8–6)
HCT VFR BLD AUTO: 38.7 % (ref 41.1–50.3)
HGB BLD-MCNC: 12.9 G/DL (ref 13.6–17.2)
HGB UR QL STRIP: NEGATIVE
INR PPP: 1
KETONES UR QL STRIP.AUTO: NEGATIVE MG/DL
LEUKOCYTE ESTERASE UR QL STRIP.AUTO: NEGATIVE
MCH RBC QN AUTO: 29.3 PG (ref 26.1–32.9)
MCHC RBC AUTO-ENTMCNC: 33.3 G/DL (ref 31.4–35)
MCV RBC AUTO: 88 FL (ref 79.6–97.8)
NITRITE UR QL STRIP.AUTO: NEGATIVE
P-R INTERVAL, ECG05: 154 MS
PH UR STRIP: 5.5 [PH] (ref 5–9)
PLATELET # BLD AUTO: 280 K/UL (ref 150–450)
PMV BLD AUTO: 10.1 FL (ref 10.8–14.1)
POTASSIUM SERPL-SCNC: 4.6 MMOL/L (ref 3.5–5.1)
PROT UR STRIP-MCNC: NEGATIVE MG/DL
PROTHROMBIN TIME: 12.7 SEC (ref 11.5–14.5)
Q-T INTERVAL, ECG07: 398 MS
QRS DURATION, ECG06: 138 MS
QTC CALCULATION (BEZET), ECG08: 403 MS
RBC # BLD AUTO: 4.4 M/UL (ref 4.23–5.67)
SERVICE CMNT-IMP: NORMAL
SODIUM SERPL-SCNC: 141 MMOL/L (ref 136–145)
SP GR UR REFRACTOMETRY: 1.01 (ref 1–1.02)
UROBILINOGEN UR QL STRIP.AUTO: 0.2 EU/DL (ref 0.2–1)
VENTRICULAR RATE, ECG03: 62 BPM
WBC # BLD AUTO: 7.9 K/UL (ref 4.3–11.1)

## 2018-03-05 PROCEDURE — 82040 ASSAY OF SERUM ALBUMIN: CPT | Performed by: THORACIC SURGERY (CARDIOTHORACIC VASCULAR SURGERY)

## 2018-03-05 PROCEDURE — 77030027138 HC INCENT SPIROMETER -A

## 2018-03-05 PROCEDURE — 85027 COMPLETE CBC AUTOMATED: CPT | Performed by: THORACIC SURGERY (CARDIOTHORACIC VASCULAR SURGERY)

## 2018-03-05 PROCEDURE — 93880 EXTRACRANIAL BILAT STUDY: CPT

## 2018-03-05 PROCEDURE — 83036 HEMOGLOBIN GLYCOSYLATED A1C: CPT | Performed by: THORACIC SURGERY (CARDIOTHORACIC VASCULAR SURGERY)

## 2018-03-05 PROCEDURE — 81003 URINALYSIS AUTO W/O SCOPE: CPT | Performed by: THORACIC SURGERY (CARDIOTHORACIC VASCULAR SURGERY)

## 2018-03-05 PROCEDURE — 80048 BASIC METABOLIC PNL TOTAL CA: CPT | Performed by: THORACIC SURGERY (CARDIOTHORACIC VASCULAR SURGERY)

## 2018-03-05 PROCEDURE — 94010 BREATHING CAPACITY TEST: CPT

## 2018-03-05 PROCEDURE — 82247 BILIRUBIN TOTAL: CPT | Performed by: THORACIC SURGERY (CARDIOTHORACIC VASCULAR SURGERY)

## 2018-03-05 PROCEDURE — 71046 X-RAY EXAM CHEST 2 VIEWS: CPT

## 2018-03-05 PROCEDURE — 85610 PROTHROMBIN TIME: CPT | Performed by: THORACIC SURGERY (CARDIOTHORACIC VASCULAR SURGERY)

## 2018-03-05 PROCEDURE — 93005 ELECTROCARDIOGRAM TRACING: CPT | Performed by: THORACIC SURGERY (CARDIOTHORACIC VASCULAR SURGERY)

## 2018-03-05 PROCEDURE — 85730 THROMBOPLASTIN TIME PARTIAL: CPT | Performed by: THORACIC SURGERY (CARDIOTHORACIC VASCULAR SURGERY)

## 2018-03-05 PROCEDURE — 87641 MR-STAPH DNA AMP PROBE: CPT | Performed by: THORACIC SURGERY (CARDIOTHORACIC VASCULAR SURGERY)

## 2018-03-05 PROCEDURE — 86923 COMPATIBILITY TEST ELECTRIC: CPT | Performed by: THORACIC SURGERY (CARDIOTHORACIC VASCULAR SURGERY)

## 2018-03-05 PROCEDURE — 86901 BLOOD TYPING SEROLOGIC RH(D): CPT | Performed by: THORACIC SURGERY (CARDIOTHORACIC VASCULAR SURGERY)

## 2018-03-05 RX ORDER — SODIUM CHLORIDE 9 MG/ML
250 INJECTION, SOLUTION INTRAVENOUS AS NEEDED
Status: DISCONTINUED | OUTPATIENT
Start: 2018-03-05 | End: 2018-03-09 | Stop reason: HOSPADM

## 2018-03-05 RX ORDER — AMIODARONE HYDROCHLORIDE 200 MG/1
600 TABLET ORAL ONCE
COMMUNITY
Start: 2018-03-06 | End: 2018-03-11

## 2018-03-05 NOTE — ANESTHESIA PREPROCEDURE EVALUATION
Anesthetic History   No history of anesthetic complications            Review of Systems / Medical History  Patient summary reviewed and pertinent labs reviewed    Pulmonary    COPD: mild      Smoker      Comments: Quit smoking when had STEMI   Neuro/Psych   Within defined limits           Cardiovascular    Hypertension  Valvular problems/murmurs: aortic stenosis        Past MI, CAD and hyperlipidemia      Comments: L Main 80%, EF just p MI 35%; 1.57 cm2   GI/Hepatic/Renal     GERD: well controlled           Endo/Other  Within defined limits           Other Findings            Physical Exam    Airway  Mallampati: II  TM Distance: > 6 cm  Neck ROM: normal range of motion   Mouth opening: Normal     Cardiovascular    Rhythm: regular           Dental  No notable dental hx       Pulmonary                 Abdominal  GI exam deferred       Other Findings            Anesthetic Plan    ASA: 4  Anesthesia type: general    Monitoring Plan: Arterial line, BIS, [place] and JUAN      Induction: Intravenous  Anesthetic plan and risks discussed with: Patient

## 2018-03-05 NOTE — PERIOP NOTES
Patient verified name, , and surgery as listed in [facility]. Patient provided medical/health information and PTA medications to the best of their ability. TYPE  CASE: 3---- orders on chart and dated 18  Labs per surgeon: cbc, bmp, pt/ptt, bili, albumin, urine, ha1c, nasal swab, type and cross--2 units rbc's, 2units plt, 2 units ffp, pft, cxr---bilta carotid---- all completed today and sent to lab                                                              Labs per anesthesia protocol: none add't  EKG:  Today-- dr Marjan Hawkins in to see pt-- reviewed ekg 18, echo, 2018, cath 2018-- ok for surg    Patient provided with and instructed on educaitonal handouts including Heart Guide to Surgery, blood transfusions, pain management, central line infection prevention, being on a ventilator and hand hygiene for the family and community, and [facility] brochure. Instructed that family must be present in building at all times. Incentive spirometry completed with return demonstration. FEV1 completed as ordered. Patient viewed pre-operative DVD. Instructed on chest-xray procedure and carotid ultrasound. Instructed on type and cross match procedure and not to remove green blood bank bracelet. Instructed on medications- Amiodarone,  and Mupirocin with Rx [facility]--- [phone]-- spoke to elvis. Mupirocin ointment to be used the night before surgery and the am of surgery. Hibiclens and instructions given per hospital policy. Instructed patient to continue previous medications as prescribed prior to surgery unless otherwise directed and to take the following medications the day of surgery according to anesthesia guidelines : coreg, asa 81 mg, lipitor . Instructed patient to hold  the following medications: pt stopped brilinta 18 per dr Evan Cabello.   OF NOTE PT HAS BEEN TAKING  MG DAILY SINCE CATH-- HAD WRONG DOSAGE--- INSTRUCTED TO CHANGE TO 81 MG AND CALLED OFFICE Caio Brown--- SPOKE Mihir Alan M.D.--- also dr Sapphire Sanchez aware-- states ok with this  Original medication prescription bottles was visualized during patient appointment. Patient teach back successful and patient demonstrates knowledge of instruction.

## 2018-03-05 NOTE — PERIOP NOTES
todays test results reviewed--- noted carotid-- left 70-99% occluded--- called nadir-- states that the radiologist rio already spoke to dr Geovanna Guardado and he was aware--- also informed that all results were ready for viewing in cc

## 2018-03-06 RX ORDER — TRANEXAMIC ACID 100 MG/ML
0.5 INJECTION, SOLUTION INTRAVENOUS ONCE
Status: DISCONTINUED | OUTPATIENT
Start: 2018-03-07 | End: 2018-03-07 | Stop reason: HOSPADM

## 2018-03-07 ENCOUNTER — ANESTHESIA (OUTPATIENT)
Dept: SURGERY | Age: 58
DRG: 220 | End: 2018-03-07
Payer: COMMERCIAL

## 2018-03-07 ENCOUNTER — HOSPITAL ENCOUNTER (INPATIENT)
Age: 58
LOS: 4 days | Discharge: HOME HEALTH | DRG: 220 | End: 2018-03-11
Attending: THORACIC SURGERY (CARDIOTHORACIC VASCULAR SURGERY) | Admitting: THORACIC SURGERY (CARDIOTHORACIC VASCULAR SURGERY)
Payer: COMMERCIAL

## 2018-03-07 ENCOUNTER — APPOINTMENT (OUTPATIENT)
Dept: GENERAL RADIOLOGY | Age: 58
DRG: 220 | End: 2018-03-07
Attending: THORACIC SURGERY (CARDIOTHORACIC VASCULAR SURGERY)
Payer: COMMERCIAL

## 2018-03-07 DIAGNOSIS — R09.02 HYPOXEMIA: ICD-10-CM

## 2018-03-07 DIAGNOSIS — I50.31 ACUTE DIASTOLIC CHF (CONGESTIVE HEART FAILURE) (HCC): ICD-10-CM

## 2018-03-07 DIAGNOSIS — Z99.11 ENCOUNTER FOR WEANING FROM VENTILATOR (HCC): ICD-10-CM

## 2018-03-07 DIAGNOSIS — J98.11 ATELECTASIS: ICD-10-CM

## 2018-03-07 DIAGNOSIS — Z72.0 TOBACCO ABUSE: Chronic | ICD-10-CM

## 2018-03-07 DIAGNOSIS — I21.02 STEMI INVOLVING LEFT ANTERIOR DESCENDING CORONARY ARTERY (HCC): Primary | ICD-10-CM

## 2018-03-07 DIAGNOSIS — J93.9 PNEUMOTHORAX, RIGHT: ICD-10-CM

## 2018-03-07 DIAGNOSIS — Z95.1 STATUS POST CORONARY ARTERY BYPASS GRAFT: ICD-10-CM

## 2018-03-07 DIAGNOSIS — Z95.2 S/P AVR (AORTIC VALVE REPLACEMENT): ICD-10-CM

## 2018-03-07 PROBLEM — Z82.49 FAMILY HISTORY OF PREMATURE CAD: Chronic | Status: ACTIVE | Noted: 2018-01-19

## 2018-03-07 LAB
ANION GAP SERPL CALC-SCNC: 6 MMOL/L (ref 7–16)
APTT PPP: 50 SEC (ref 23.2–35.3)
APTT PPP: 50.4 SEC (ref 23.2–35.3)
ARTERIAL PATENCY WRIST A: ABNORMAL
ATRIAL RATE: 70 BPM
BASE DEFICIT BLD-SCNC: 1 MMOL/L
BASE DEFICIT BLD-SCNC: 2 MMOL/L
BASE DEFICIT BLD-SCNC: 3 MMOL/L
BASE DEFICIT BLDA-SCNC: 2.1 MMOL/L (ref 0–2)
BASE EXCESS BLD CALC-SCNC: 0 MMOL/L
BASE EXCESS BLD CALC-SCNC: 0 MMOL/L
BASE EXCESS BLD CALC-SCNC: 6 MMOL/L
BDY SITE: ABNORMAL
BUN SERPL-MCNC: 14 MG/DL (ref 6–23)
CA-I BLD-MCNC: 1.04 MMOL/L (ref 1.12–1.32)
CA-I BLD-MCNC: 1.13 MMOL/L (ref 1.12–1.32)
CA-I BLD-MCNC: 1.13 MMOL/L (ref 1.12–1.32)
CA-I BLD-MCNC: 1.15 MMOL/L (ref 1.12–1.32)
CA-I BLD-MCNC: 1.16 MMOL/L (ref 1.12–1.32)
CA-I BLD-MCNC: 1.16 MMOL/L (ref 1.12–1.32)
CA-I BLD-MCNC: 1.21 MMOL/L (ref 1.12–1.32)
CA-I BLD-MCNC: 1.23 MMOL/L (ref 1.12–1.32)
CA-I BLD-MCNC: 1.33 MMOL/L (ref 1.12–1.32)
CA-I BLD-SCNC: 1.17 MMOL/L (ref 1–1.3)
CALCIUM SERPL-MCNC: 7.2 MG/DL (ref 8.3–10.4)
CALCULATED P AXIS, ECG09: 62 DEGREES
CALCULATED R AXIS, ECG10: 86 DEGREES
CALCULATED T AXIS, ECG11: -20 DEGREES
CHLORIDE BLDA-SCNC: 113 MMOL/L (ref 98–106)
CHLORIDE SERPL-SCNC: 117 MMOL/L (ref 98–107)
CO2 SERPL-SCNC: 25 MMOL/L (ref 21–32)
COHGB MFR BLD: 0.3 % (ref 0.5–1.5)
CREAT SERPL-MCNC: 0.77 MG/DL (ref 0.8–1.5)
DIAGNOSIS, 93000: NORMAL
DO-HGB BLD-MCNC: 3 % (ref 0–5)
ERYTHROCYTE [DISTWIDTH] IN BLOOD BY AUTOMATED COUNT: 13.5 % (ref 11.9–14.6)
FIBRINOGEN PPP-MCNC: 131 MG/DL (ref 190–501)
FIBRINOGEN PPP-MCNC: 154 MG/DL (ref 190–501)
FIO2 ON VENT: 60 %
GLUCOSE BLD STRIP.AUTO-MCNC: 118 MG/DL (ref 65–100)
GLUCOSE BLD STRIP.AUTO-MCNC: 121 MG/DL (ref 65–100)
GLUCOSE BLD STRIP.AUTO-MCNC: 123 MG/DL (ref 65–100)
GLUCOSE BLD STRIP.AUTO-MCNC: 126 MG/DL (ref 65–100)
GLUCOSE BLD STRIP.AUTO-MCNC: 126 MG/DL (ref 65–100)
GLUCOSE BLD STRIP.AUTO-MCNC: 128 MG/DL (ref 65–100)
GLUCOSE BLD STRIP.AUTO-MCNC: 130 MG/DL (ref 65–100)
GLUCOSE BLD STRIP.AUTO-MCNC: 130 MG/DL (ref 65–100)
GLUCOSE BLD STRIP.AUTO-MCNC: 134 MG/DL (ref 65–100)
GLUCOSE BLD STRIP.AUTO-MCNC: 139 MG/DL (ref 65–100)
GLUCOSE BLD STRIP.AUTO-MCNC: 145 MG/DL (ref 65–100)
GLUCOSE BLD STRIP.AUTO-MCNC: 145 MG/DL (ref 65–100)
GLUCOSE BLD STRIP.AUTO-MCNC: 162 MG/DL (ref 65–100)
GLUCOSE BLD STRIP.AUTO-MCNC: 166 MG/DL (ref 65–100)
GLUCOSE BLD STRIP.AUTO-MCNC: 95 MG/DL (ref 65–100)
GLUCOSE BLD STRIP.AUTO-MCNC: 95 MG/DL (ref 65–100)
GLUCOSE SERPL-MCNC: 94 MG/DL (ref 65–100)
HCO3 BLD-SCNC: 23.6 MMOL/L (ref 22–26)
HCO3 BLD-SCNC: 24 MMOL/L (ref 22–26)
HCO3 BLD-SCNC: 24.2 MMOL/L (ref 22–26)
HCO3 BLD-SCNC: 24.4 MMOL/L (ref 22–26)
HCO3 BLD-SCNC: 24.5 MMOL/L (ref 22–26)
HCO3 BLD-SCNC: 24.8 MMOL/L (ref 22–26)
HCO3 BLD-SCNC: 25.8 MMOL/L (ref 22–26)
HCO3 BLD-SCNC: 26.2 MMOL/L (ref 22–26)
HCO3 BLD-SCNC: 31 MMOL/L (ref 22–26)
HCO3 BLDA-SCNC: 24 MMOL/L (ref 22–26)
HCT VFR BLD AUTO: 29 % (ref 41.1–50.3)
HCT VFR BLD AUTO: 32.2 % (ref 41.1–50.3)
HGB BLD-MCNC: 10.6 G/DL (ref 13.6–17.2)
HGB BLD-MCNC: 9.6 G/DL (ref 13.6–17.2)
HGB BLDMV-MCNC: 11.6 GM/DL (ref 11.7–15)
INR PPP: 1.6
INR PPP: 1.7
MAGNESIUM SERPL-MCNC: 2.5 MG/DL (ref 1.8–2.4)
MAGNESIUM SERPL-MCNC: 2.6 MG/DL (ref 1.8–2.4)
MAGNESIUM SERPL-MCNC: 3.4 MG/DL (ref 1.8–2.4)
MCH RBC QN AUTO: 29.4 PG (ref 26.1–32.9)
MCHC RBC AUTO-ENTMCNC: 32.9 G/DL (ref 31.4–35)
MCV RBC AUTO: 89.2 FL (ref 79.6–97.8)
METHGB MFR BLD: 0.6 % (ref 0–1.5)
OXYHGB MFR BLDA: 96.5 % (ref 94–97)
P-R INTERVAL, ECG05: 172 MS
PCO2 BLD: 44.5 MMHG (ref 35–45)
PCO2 BLD: 45.3 MMHG (ref 35–45)
PCO2 BLD: 45.7 MMHG (ref 35–45)
PCO2 BLD: 46.5 MMHG (ref 35–45)
PCO2 BLD: 48.1 MMHG (ref 35–45)
PCO2 BLD: 49.6 MMHG (ref 35–45)
PCO2 BLD: 49.7 MMHG (ref 35–45)
PCO2 BLD: 50.6 MMHG (ref 35–45)
PCO2 BLD: 51.2 MMHG (ref 35–45)
PCO2 BLDA: 46 MMHG (ref 35–45)
PEEP RESPIRATORY: 8 CM[H2O]
PH BLD: 7.3 [PH] (ref 7.35–7.45)
PH BLD: 7.31 [PH] (ref 7.35–7.45)
PH BLD: 7.31 [PH] (ref 7.35–7.45)
PH BLD: 7.32 [PH] (ref 7.35–7.45)
PH BLD: 7.33 [PH] (ref 7.35–7.45)
PH BLD: 7.33 [PH] (ref 7.35–7.45)
PH BLD: 7.34 [PH] (ref 7.35–7.45)
PH BLD: 7.35 [PH] (ref 7.35–7.45)
PH BLD: 7.39 [PH] (ref 7.35–7.45)
PH BLDA: 7.33 [PH] (ref 7.35–7.45)
PLATELET # BLD AUTO: 163 K/UL (ref 150–450)
PMV BLD AUTO: 10.2 FL (ref 10.8–14.1)
PO2 BLD: 144 MMHG (ref 80–105)
PO2 BLD: 310 MMHG (ref 80–105)
PO2 BLD: 342 MMHG (ref 80–105)
PO2 BLD: 361 MMHG (ref 80–105)
PO2 BLD: 367 MMHG (ref 80–105)
PO2 BLD: 389 MMHG (ref 80–105)
PO2 BLD: 391 MMHG (ref 80–105)
PO2 BLD: 391 MMHG (ref 80–105)
PO2 BLD: 457 MMHG (ref 80–105)
PO2 BLDA: 115 MMHG (ref 80–105)
POTASSIUM BLD-SCNC: 4.2 MMOL/L (ref 3.5–5.1)
POTASSIUM BLD-SCNC: 4.2 MMOL/L (ref 3.5–5.1)
POTASSIUM BLD-SCNC: 4.4 MMOL/L (ref 3.5–5.1)
POTASSIUM BLD-SCNC: 4.4 MMOL/L (ref 3.5–5.1)
POTASSIUM BLD-SCNC: 5.2 MMOL/L (ref 3.5–5.1)
POTASSIUM BLD-SCNC: 5.5 MMOL/L (ref 3.5–5.1)
POTASSIUM BLD-SCNC: 5.6 MMOL/L (ref 3.5–5.1)
POTASSIUM BLDA-SCNC: 4.35 MMOL/L (ref 3.5–5.3)
POTASSIUM SERPL-SCNC: 4.4 MMOL/L (ref 3.5–5.1)
POTASSIUM SERPL-SCNC: 4.5 MMOL/L (ref 3.5–5.1)
POTASSIUM SERPL-SCNC: 4.8 MMOL/L (ref 3.5–5.1)
PROTHROMBIN TIME: 18.3 SEC (ref 11.5–14.5)
PROTHROMBIN TIME: 19.5 SEC (ref 11.5–14.5)
Q-T INTERVAL, ECG07: 418 MS
QRS DURATION, ECG06: 144 MS
QTC CALCULATION (BEZET), ECG08: 451 MS
RBC # BLD AUTO: 3.61 M/UL (ref 4.23–5.67)
RESP RATE: 15
SAO2 % BLD: 100 % (ref 95–98)
SAO2 % BLD: 97 % (ref 92–98.5)
SAO2 % BLD: 99 % (ref 95–98)
SERVICE CMNT-IMP: ABNORMAL
SODIUM BLD-SCNC: 138 MMOL/L (ref 136–145)
SODIUM BLD-SCNC: 139 MMOL/L (ref 136–145)
SODIUM BLD-SCNC: 140 MMOL/L (ref 136–145)
SODIUM BLD-SCNC: 140 MMOL/L (ref 136–145)
SODIUM BLD-SCNC: 141 MMOL/L (ref 136–145)
SODIUM BLD-SCNC: 143 MMOL/L (ref 136–145)
SODIUM BLD-SCNC: 144 MMOL/L (ref 136–145)
SODIUM BLDA-SCNC: 138.2 MMOL/L (ref 135–148)
SODIUM SERPL-SCNC: 148 MMOL/L (ref 136–145)
VENTILATION MODE VENT: ABNORMAL
VENTRICULAR RATE, ECG03: 70 BPM
VT SETTING VENT: 500 ML
WBC # BLD AUTO: 13.6 K/UL (ref 4.3–11.1)

## 2018-03-07 PROCEDURE — 74011636637 HC RX REV CODE- 636/637: Performed by: THORACIC SURGERY (CARDIOTHORACIC VASCULAR SURGERY)

## 2018-03-07 PROCEDURE — 77030018571 HC SUT PROL1 J&J -B: Performed by: THORACIC SURGERY (CARDIOTHORACIC VASCULAR SURGERY)

## 2018-03-07 PROCEDURE — 99223 1ST HOSP IP/OBS HIGH 75: CPT | Performed by: INTERNAL MEDICINE

## 2018-03-07 PROCEDURE — 77030002987 HC SUT PROL J&J -B: Performed by: THORACIC SURGERY (CARDIOTHORACIC VASCULAR SURGERY)

## 2018-03-07 PROCEDURE — 77030010813: Performed by: THORACIC SURGERY (CARDIOTHORACIC VASCULAR SURGERY)

## 2018-03-07 PROCEDURE — 77030010939 HC CLP LIG TELE -B: Performed by: THORACIC SURGERY (CARDIOTHORACIC VASCULAR SURGERY)

## 2018-03-07 PROCEDURE — 77030013839 HC OCCL INT FLRST BAXT -B: Performed by: THORACIC SURGERY (CARDIOTHORACIC VASCULAR SURGERY)

## 2018-03-07 PROCEDURE — 77030025646 HC AUTOTRNSFUS KT TERU -C: Performed by: THORACIC SURGERY (CARDIOTHORACIC VASCULAR SURGERY)

## 2018-03-07 PROCEDURE — 77030034888 HC SUT PROL 2 J&J -B: Performed by: THORACIC SURGERY (CARDIOTHORACIC VASCULAR SURGERY)

## 2018-03-07 PROCEDURE — 74011636637 HC RX REV CODE- 636/637

## 2018-03-07 PROCEDURE — 77030009355 HC CRDPLG DEL SET QUES -C: Performed by: THORACIC SURGERY (CARDIOTHORACIC VASCULAR SURGERY)

## 2018-03-07 PROCEDURE — 77030018729 HC ELECTRD DEFIB PAD CARD -B: Performed by: THORACIC SURGERY (CARDIOTHORACIC VASCULAR SURGERY)

## 2018-03-07 PROCEDURE — 77030003010 HC SUT SURG STL J&J -B: Performed by: THORACIC SURGERY (CARDIOTHORACIC VASCULAR SURGERY)

## 2018-03-07 PROCEDURE — 77030015758: Performed by: THORACIC SURGERY (CARDIOTHORACIC VASCULAR SURGERY)

## 2018-03-07 PROCEDURE — 77030005401 HC CATH RAD ARRO -A: Performed by: ANESTHESIOLOGY

## 2018-03-07 PROCEDURE — 74011000258 HC RX REV CODE- 258: Performed by: THORACIC SURGERY (CARDIOTHORACIC VASCULAR SURGERY)

## 2018-03-07 PROCEDURE — 77030005537 HC CATH URETH BARD -A: Performed by: THORACIC SURGERY (CARDIOTHORACIC VASCULAR SURGERY)

## 2018-03-07 PROCEDURE — 74011000250 HC RX REV CODE- 250: Performed by: THORACIC SURGERY (CARDIOTHORACIC VASCULAR SURGERY)

## 2018-03-07 PROCEDURE — 77030037092 HC DRN CHST DRY SUCT WATER SEAL GTNG -B: Performed by: THORACIC SURGERY (CARDIOTHORACIC VASCULAR SURGERY)

## 2018-03-07 PROCEDURE — 77030020751 HC FLTR TBNG TRNSFUS HAEM -A: Performed by: THORACIC SURGERY (CARDIOTHORACIC VASCULAR SURGERY)

## 2018-03-07 PROCEDURE — 86580 TB INTRADERMAL TEST: CPT | Performed by: THORACIC SURGERY (CARDIOTHORACIC VASCULAR SURGERY)

## 2018-03-07 PROCEDURE — 36600 WITHDRAWAL OF ARTERIAL BLOOD: CPT

## 2018-03-07 PROCEDURE — 77030008771 HC TU NG SALEM SUMP -A: Performed by: ANESTHESIOLOGY

## 2018-03-07 PROCEDURE — 84295 ASSAY OF SERUM SODIUM: CPT

## 2018-03-07 PROCEDURE — 76060000046 HC ANESTHESIA 7.5 TO 8 HR: Performed by: THORACIC SURGERY (CARDIOTHORACIC VASCULAR SURGERY)

## 2018-03-07 PROCEDURE — 77030002888 HC SUT CHRMC J&J -A: Performed by: THORACIC SURGERY (CARDIOTHORACIC VASCULAR SURGERY)

## 2018-03-07 PROCEDURE — 74011250636 HC RX REV CODE- 250/636

## 2018-03-07 PROCEDURE — 77030013292 HC BOWL MX PRSM J&J -A: Performed by: ANESTHESIOLOGY

## 2018-03-07 PROCEDURE — 77030016687: Performed by: THORACIC SURGERY (CARDIOTHORACIC VASCULAR SURGERY)

## 2018-03-07 PROCEDURE — 71045 X-RAY EXAM CHEST 1 VIEW: CPT

## 2018-03-07 PROCEDURE — 82330 ASSAY OF CALCIUM: CPT

## 2018-03-07 PROCEDURE — 85610 PROTHROMBIN TIME: CPT | Performed by: THORACIC SURGERY (CARDIOTHORACIC VASCULAR SURGERY)

## 2018-03-07 PROCEDURE — P9045 ALBUMIN (HUMAN), 5%, 250 ML: HCPCS | Performed by: THORACIC SURGERY (CARDIOTHORACIC VASCULAR SURGERY)

## 2018-03-07 PROCEDURE — C1751 CATH, INF, PER/CENT/MIDLINE: HCPCS | Performed by: ANESTHESIOLOGY

## 2018-03-07 PROCEDURE — C1729 CATH, DRAINAGE: HCPCS | Performed by: THORACIC SURGERY (CARDIOTHORACIC VASCULAR SURGERY)

## 2018-03-07 PROCEDURE — 74011250636 HC RX REV CODE- 250/636: Performed by: THORACIC SURGERY (CARDIOTHORACIC VASCULAR SURGERY)

## 2018-03-07 PROCEDURE — 77030020782 HC GWN BAIR PAWS FLX 3M -B: Performed by: ANESTHESIOLOGY

## 2018-03-07 PROCEDURE — 77030006689 HC BLD OPHTH BVR BD -A: Performed by: THORACIC SURGERY (CARDIOTHORACIC VASCULAR SURGERY)

## 2018-03-07 PROCEDURE — 77030016564 HC BLD STRNL SAW4 CNMD -B: Performed by: THORACIC SURGERY (CARDIOTHORACIC VASCULAR SURGERY)

## 2018-03-07 PROCEDURE — 77030005521 HC CATH URETH FOL38 BARD -B: Performed by: THORACIC SURGERY (CARDIOTHORACIC VASCULAR SURGERY)

## 2018-03-07 PROCEDURE — 77030034927 HC PK PROC CPB INSPIRE PERF LIVA -F: Performed by: THORACIC SURGERY (CARDIOTHORACIC VASCULAR SURGERY)

## 2018-03-07 PROCEDURE — 77030002970 HC SUT PLEDG TELE -A: Performed by: THORACIC SURGERY (CARDIOTHORACIC VASCULAR SURGERY)

## 2018-03-07 PROCEDURE — 77030002933 HC SUT MCRYL J&J -A: Performed by: THORACIC SURGERY (CARDIOTHORACIC VASCULAR SURGERY)

## 2018-03-07 PROCEDURE — 76010000155 HC AUTO TRANSFUSION/CELL SAVER: Performed by: THORACIC SURGERY (CARDIOTHORACIC VASCULAR SURGERY)

## 2018-03-07 PROCEDURE — 77030002912 HC SUT ETHBND J&J -A: Performed by: THORACIC SURGERY (CARDIOTHORACIC VASCULAR SURGERY)

## 2018-03-07 PROCEDURE — 85730 THROMBOPLASTIN TIME PARTIAL: CPT | Performed by: THORACIC SURGERY (CARDIOTHORACIC VASCULAR SURGERY)

## 2018-03-07 PROCEDURE — 77030013797 HC KT TRNSDUC PRSSR EDWD -A: Performed by: THORACIC SURGERY (CARDIOTHORACIC VASCULAR SURGERY)

## 2018-03-07 PROCEDURE — 77030008477 HC STYL SATN SLP COVD -A: Performed by: ANESTHESIOLOGY

## 2018-03-07 PROCEDURE — 80048 BASIC METABOLIC PNL TOTAL CA: CPT | Performed by: THORACIC SURGERY (CARDIOTHORACIC VASCULAR SURGERY)

## 2018-03-07 PROCEDURE — 82947 ASSAY GLUCOSE BLOOD QUANT: CPT

## 2018-03-07 PROCEDURE — 77030031139 HC SUT VCRL2 J&J -A: Performed by: THORACIC SURGERY (CARDIOTHORACIC VASCULAR SURGERY)

## 2018-03-07 PROCEDURE — 77030002520 HC INSRT CLMP LATIS STLTH AMR -B: Performed by: THORACIC SURGERY (CARDIOTHORACIC VASCULAR SURGERY)

## 2018-03-07 PROCEDURE — 77030008703 HC TU ET UNCUF COVD -A: Performed by: ANESTHESIOLOGY

## 2018-03-07 PROCEDURE — 85027 COMPLETE CBC AUTOMATED: CPT | Performed by: THORACIC SURGERY (CARDIOTHORACIC VASCULAR SURGERY)

## 2018-03-07 PROCEDURE — 77030013794 HC KT TRNSDUC BLD EDWD -B: Performed by: ANESTHESIOLOGY

## 2018-03-07 PROCEDURE — 74011000258 HC RX REV CODE- 258

## 2018-03-07 PROCEDURE — 74011000250 HC RX REV CODE- 250

## 2018-03-07 PROCEDURE — 82962 GLUCOSE BLOOD TEST: CPT

## 2018-03-07 PROCEDURE — 74011000302 HC RX REV CODE- 302: Performed by: THORACIC SURGERY (CARDIOTHORACIC VASCULAR SURGERY)

## 2018-03-07 PROCEDURE — 84132 ASSAY OF SERUM POTASSIUM: CPT | Performed by: THORACIC SURGERY (CARDIOTHORACIC VASCULAR SURGERY)

## 2018-03-07 PROCEDURE — 88305 TISSUE EXAM BY PATHOLOGIST: CPT | Performed by: THORACIC SURGERY (CARDIOTHORACIC VASCULAR SURGERY)

## 2018-03-07 PROCEDURE — P9045 ALBUMIN (HUMAN), 5%, 250 ML: HCPCS

## 2018-03-07 PROCEDURE — 74011250636 HC RX REV CODE- 250/636: Performed by: ANESTHESIOLOGY

## 2018-03-07 PROCEDURE — 77030020751 HC FLTR TBNG TRNSFUS HAEM -A: Performed by: ANESTHESIOLOGY

## 2018-03-07 PROCEDURE — 82803 BLOOD GASES ANY COMBINATION: CPT

## 2018-03-07 PROCEDURE — P9047 ALBUMIN (HUMAN), 25%, 50ML: HCPCS

## 2018-03-07 PROCEDURE — 76010000223 HC CV SURG 7 TO 7.5 HR INTENSV-TIER 1: Performed by: THORACIC SURGERY (CARDIOTHORACIC VASCULAR SURGERY)

## 2018-03-07 PROCEDURE — 77030018836 HC SOL IRR NACL ICUM -A: Performed by: THORACIC SURGERY (CARDIOTHORACIC VASCULAR SURGERY)

## 2018-03-07 PROCEDURE — 77030026009 HC VLV AORT TIS MGNA EDWD -K2: Performed by: THORACIC SURGERY (CARDIOTHORACIC VASCULAR SURGERY)

## 2018-03-07 PROCEDURE — 77030034936 HC DEV MIN COR-KNOT KT LSIS -F: Performed by: THORACIC SURGERY (CARDIOTHORACIC VASCULAR SURGERY)

## 2018-03-07 PROCEDURE — 65610000006 HC RM INTENSIVE CARE

## 2018-03-07 PROCEDURE — 83735 ASSAY OF MAGNESIUM: CPT | Performed by: THORACIC SURGERY (CARDIOTHORACIC VASCULAR SURGERY)

## 2018-03-07 PROCEDURE — 85018 HEMOGLOBIN: CPT | Performed by: THORACIC SURGERY (CARDIOTHORACIC VASCULAR SURGERY)

## 2018-03-07 PROCEDURE — 77030019908 HC STETH ESOPH SIMS -A: Performed by: ANESTHESIOLOGY

## 2018-03-07 PROCEDURE — 77030020407 HC IV BLD WRMR ST 3M -A: Performed by: ANESTHESIOLOGY

## 2018-03-07 PROCEDURE — 77030033070 HC RLD QLC FPCH COR-KNOT LSIS -C: Performed by: THORACIC SURGERY (CARDIOTHORACIC VASCULAR SURGERY)

## 2018-03-07 PROCEDURE — 94002 VENT MGMT INPAT INIT DAY: CPT

## 2018-03-07 PROCEDURE — 74011250637 HC RX REV CODE- 250/637: Performed by: ANESTHESIOLOGY

## 2018-03-07 PROCEDURE — 77030002986 HC SUT PROL J&J -A: Performed by: THORACIC SURGERY (CARDIOTHORACIC VASCULAR SURGERY)

## 2018-03-07 PROCEDURE — 77030018547 HC SUT ETHBND1 J&J -B: Performed by: THORACIC SURGERY (CARDIOTHORACIC VASCULAR SURGERY)

## 2018-03-07 PROCEDURE — 88311 DECALCIFY TISSUE: CPT | Performed by: THORACIC SURGERY (CARDIOTHORACIC VASCULAR SURGERY)

## 2018-03-07 PROCEDURE — 80051 ELECTROLYTE PANEL: CPT

## 2018-03-07 PROCEDURE — 77030019597 HC DRN CHST PLUER TELE -B: Performed by: THORACIC SURGERY (CARDIOTHORACIC VASCULAR SURGERY)

## 2018-03-07 PROCEDURE — 77030018548 HC SUT ETHBND2 J&J -B: Performed by: THORACIC SURGERY (CARDIOTHORACIC VASCULAR SURGERY)

## 2018-03-07 PROCEDURE — 77030013861 HC PNCH AORT CLNCUT QUES -B: Performed by: THORACIC SURGERY (CARDIOTHORACIC VASCULAR SURGERY)

## 2018-03-07 PROCEDURE — 77030006247 HC LD PCMKR MYOCRD BPLR TEMP MEDT -B: Performed by: THORACIC SURGERY (CARDIOTHORACIC VASCULAR SURGERY)

## 2018-03-07 PROCEDURE — 93005 ELECTROCARDIOGRAM TRACING: CPT | Performed by: THORACIC SURGERY (CARDIOTHORACIC VASCULAR SURGERY)

## 2018-03-07 PROCEDURE — 77030025383: Performed by: THORACIC SURGERY (CARDIOTHORACIC VASCULAR SURGERY)

## 2018-03-07 PROCEDURE — C1769 GUIDE WIRE: HCPCS | Performed by: THORACIC SURGERY (CARDIOTHORACIC VASCULAR SURGERY)

## 2018-03-07 PROCEDURE — 77030003422 HC NDL ASPIR NOVO -A: Performed by: THORACIC SURGERY (CARDIOTHORACIC VASCULAR SURGERY)

## 2018-03-07 PROCEDURE — 77030002996 HC SUT SLK J&J -A: Performed by: THORACIC SURGERY (CARDIOTHORACIC VASCULAR SURGERY)

## 2018-03-07 PROCEDURE — 74011250637 HC RX REV CODE- 250/637: Performed by: THORACIC SURGERY (CARDIOTHORACIC VASCULAR SURGERY)

## 2018-03-07 PROCEDURE — 77030033069 HC RLD QLC SGL COR-KNOT LSIS -B: Performed by: THORACIC SURGERY (CARDIOTHORACIC VASCULAR SURGERY)

## 2018-03-07 PROCEDURE — 77030011640 HC PAD GRND REM COVD -A: Performed by: THORACIC SURGERY (CARDIOTHORACIC VASCULAR SURGERY)

## 2018-03-07 PROCEDURE — 77030025827 HC BG BLD DNR AUTLG MEDT -A: Performed by: THORACIC SURGERY (CARDIOTHORACIC VASCULAR SURGERY)

## 2018-03-07 PROCEDURE — 85384 FIBRINOGEN ACTIVITY: CPT | Performed by: THORACIC SURGERY (CARDIOTHORACIC VASCULAR SURGERY)

## 2018-03-07 DEVICE — CARPENTIER-EDWARDS PERIMOUNT MAGNA EASE PERICARDIAL AORTIC BIOPROSTHESIS
Type: IMPLANTABLE DEVICE | Site: AORTIC VALVE | Status: FUNCTIONAL
Brand: CARPENTIER-EDWARDS PERIMOUNT MAGNA EASE PERICARDIAL BIOPROSTHESIS - AORTIC

## 2018-03-07 RX ORDER — MIDAZOLAM HYDROCHLORIDE 1 MG/ML
INJECTION, SOLUTION INTRAMUSCULAR; INTRAVENOUS AS NEEDED
Status: DISCONTINUED | OUTPATIENT
Start: 2018-03-07 | End: 2018-03-07 | Stop reason: HOSPADM

## 2018-03-07 RX ORDER — AMIODARONE HYDROCHLORIDE 200 MG/1
200 TABLET ORAL EVERY 12 HOURS
Status: DISCONTINUED | OUTPATIENT
Start: 2018-03-07 | End: 2018-03-08

## 2018-03-07 RX ORDER — CEFAZOLIN SODIUM 1 G/3ML
INJECTION, POWDER, FOR SOLUTION INTRAMUSCULAR; INTRAVENOUS AS NEEDED
Status: DISCONTINUED | OUTPATIENT
Start: 2018-03-07 | End: 2018-03-07 | Stop reason: HOSPADM

## 2018-03-07 RX ORDER — SODIUM CHLORIDE, SODIUM LACTATE, POTASSIUM CHLORIDE, CALCIUM CHLORIDE 600; 310; 30; 20 MG/100ML; MG/100ML; MG/100ML; MG/100ML
INJECTION, SOLUTION INTRAVENOUS
Status: DISCONTINUED | OUTPATIENT
Start: 2018-03-07 | End: 2018-03-07 | Stop reason: HOSPADM

## 2018-03-07 RX ORDER — SODIUM CHLORIDE 9 MG/ML
25 INJECTION, SOLUTION INTRAVENOUS CONTINUOUS
Status: DISCONTINUED | OUTPATIENT
Start: 2018-03-07 | End: 2018-03-08

## 2018-03-07 RX ORDER — NITROGLYCERIN 20 MG/100ML
10-100 INJECTION INTRAVENOUS
Status: DISCONTINUED | OUTPATIENT
Start: 2018-03-07 | End: 2018-03-08

## 2018-03-07 RX ORDER — CEFAZOLIN SODIUM/WATER 2 G/20 ML
2 SYRINGE (ML) INTRAVENOUS EVERY 8 HOURS
Status: COMPLETED | OUTPATIENT
Start: 2018-03-07 | End: 2018-03-08

## 2018-03-07 RX ORDER — DEXTROSE, SODIUM CHLORIDE, AND POTASSIUM CHLORIDE 5; .45; .15 G/100ML; G/100ML; G/100ML
25 INJECTION INTRAVENOUS CONTINUOUS
Status: DISCONTINUED | OUTPATIENT
Start: 2018-03-07 | End: 2018-03-08

## 2018-03-07 RX ORDER — CARVEDILOL 3.12 MG/1
3.12 TABLET ORAL 2 TIMES DAILY WITH MEALS
Status: DISCONTINUED | OUTPATIENT
Start: 2018-03-07 | End: 2018-03-08

## 2018-03-07 RX ORDER — FAMOTIDINE 20 MG/1
20 TABLET, FILM COATED ORAL ONCE
Status: COMPLETED | OUTPATIENT
Start: 2018-03-07 | End: 2018-03-07

## 2018-03-07 RX ORDER — DOBUTAMINE HYDROCHLORIDE 200 MG/100ML
2-7 INJECTION INTRAVENOUS
Status: DISCONTINUED | OUTPATIENT
Start: 2018-03-07 | End: 2018-03-08

## 2018-03-07 RX ORDER — OXYCODONE AND ACETAMINOPHEN 10; 325 MG/1; MG/1
1 TABLET ORAL
Status: DISCONTINUED | OUTPATIENT
Start: 2018-03-07 | End: 2018-03-11 | Stop reason: HOSPADM

## 2018-03-07 RX ORDER — AMIODARONE HYDROCHLORIDE 200 MG/1
400 TABLET ORAL ONCE
Status: COMPLETED | OUTPATIENT
Start: 2018-03-07 | End: 2018-03-07

## 2018-03-07 RX ORDER — VECURONIUM BROMIDE FOR INJECTION 1 MG/ML
INJECTION, POWDER, LYOPHILIZED, FOR SOLUTION INTRAVENOUS AS NEEDED
Status: DISCONTINUED | OUTPATIENT
Start: 2018-03-07 | End: 2018-03-07 | Stop reason: HOSPADM

## 2018-03-07 RX ORDER — MAGNESIUM SULFATE 1 G/100ML
1 INJECTION INTRAVENOUS AS NEEDED
Status: DISCONTINUED | OUTPATIENT
Start: 2018-03-07 | End: 2018-03-08

## 2018-03-07 RX ORDER — CEFAZOLIN SODIUM/WATER 2 G/20 ML
2 SYRINGE (ML) INTRAVENOUS ONCE
Status: COMPLETED | OUTPATIENT
Start: 2018-03-07 | End: 2018-03-07

## 2018-03-07 RX ORDER — SODIUM CHLORIDE 9 MG/ML
INJECTION, SOLUTION INTRAVENOUS
Status: DISCONTINUED | OUTPATIENT
Start: 2018-03-07 | End: 2018-03-07 | Stop reason: HOSPADM

## 2018-03-07 RX ORDER — NALOXONE HYDROCHLORIDE 0.4 MG/ML
0.4 INJECTION, SOLUTION INTRAMUSCULAR; INTRAVENOUS; SUBCUTANEOUS AS NEEDED
Status: DISCONTINUED | OUTPATIENT
Start: 2018-03-07 | End: 2018-03-08

## 2018-03-07 RX ORDER — POTASSIUM CHLORIDE 14.9 MG/ML
10 INJECTION INTRAVENOUS AS NEEDED
Status: DISCONTINUED | OUTPATIENT
Start: 2018-03-07 | End: 2018-03-08

## 2018-03-07 RX ORDER — INSULIN GLARGINE 100 [IU]/ML
20 INJECTION, SOLUTION SUBCUTANEOUS ONCE
Status: COMPLETED | OUTPATIENT
Start: 2018-03-07 | End: 2018-03-07

## 2018-03-07 RX ORDER — GUAIFENESIN 100 MG/5ML
81 LIQUID (ML) ORAL DAILY
Status: DISCONTINUED | OUTPATIENT
Start: 2018-03-08 | End: 2018-03-08

## 2018-03-07 RX ORDER — GUAIFENESIN 100 MG/5ML
325 LIQUID (ML) ORAL DAILY
Status: DISCONTINUED | OUTPATIENT
Start: 2018-03-08 | End: 2018-03-08

## 2018-03-07 RX ORDER — OXYCODONE AND ACETAMINOPHEN 5; 325 MG/1; MG/1
1 TABLET ORAL
Status: DISCONTINUED | OUTPATIENT
Start: 2018-03-07 | End: 2018-03-08

## 2018-03-07 RX ORDER — SODIUM BICARBONATE 1 MEQ/ML
50 SYRINGE (ML) INTRAVENOUS AS NEEDED
Status: DISCONTINUED | OUTPATIENT
Start: 2018-03-07 | End: 2018-03-08

## 2018-03-07 RX ORDER — HEPARIN SODIUM 1000 [USP'U]/ML
INJECTION, SOLUTION INTRAVENOUS; SUBCUTANEOUS AS NEEDED
Status: DISCONTINUED | OUTPATIENT
Start: 2018-03-07 | End: 2018-03-07 | Stop reason: HOSPADM

## 2018-03-07 RX ORDER — SODIUM CHLORIDE, SODIUM LACTATE, POTASSIUM CHLORIDE, CALCIUM CHLORIDE 600; 310; 30; 20 MG/100ML; MG/100ML; MG/100ML; MG/100ML
75 INJECTION, SOLUTION INTRAVENOUS CONTINUOUS
Status: DISCONTINUED | OUTPATIENT
Start: 2018-03-07 | End: 2018-03-07 | Stop reason: HOSPADM

## 2018-03-07 RX ORDER — PROPOFOL 10 MG/ML
0-50 VIAL (ML) INTRAVENOUS
Status: DISCONTINUED | OUTPATIENT
Start: 2018-03-07 | End: 2018-03-08

## 2018-03-07 RX ORDER — ONDANSETRON 2 MG/ML
4 INJECTION INTRAMUSCULAR; INTRAVENOUS
Status: DISCONTINUED | OUTPATIENT
Start: 2018-03-07 | End: 2018-03-08

## 2018-03-07 RX ORDER — SUFENTANIL CITRATE 50 UG/ML
INJECTION EPIDURAL; INTRAVENOUS AS NEEDED
Status: DISCONTINUED | OUTPATIENT
Start: 2018-03-07 | End: 2018-03-07 | Stop reason: HOSPADM

## 2018-03-07 RX ORDER — MUPIROCIN 20 MG/G
OINTMENT TOPICAL 2 TIMES DAILY
Status: DISCONTINUED | OUTPATIENT
Start: 2018-03-07 | End: 2018-03-08

## 2018-03-07 RX ORDER — PROTAMINE SULFATE 10 MG/ML
INJECTION, SOLUTION INTRAVENOUS AS NEEDED
Status: DISCONTINUED | OUTPATIENT
Start: 2018-03-07 | End: 2018-03-07 | Stop reason: HOSPADM

## 2018-03-07 RX ORDER — SODIUM CHLORIDE 0.9 % (FLUSH) 0.9 %
5-10 SYRINGE (ML) INJECTION EVERY 8 HOURS
Status: DISCONTINUED | OUTPATIENT
Start: 2018-03-07 | End: 2018-03-08

## 2018-03-07 RX ORDER — CHLORHEXIDINE GLUCONATE 1.2 MG/ML
10 RINSE ORAL 2 TIMES DAILY
Status: DISCONTINUED | OUTPATIENT
Start: 2018-03-07 | End: 2018-03-08

## 2018-03-07 RX ORDER — ALBUMIN HUMAN 50 G/1000ML
25 SOLUTION INTRAVENOUS AS NEEDED
Status: DISCONTINUED | OUTPATIENT
Start: 2018-03-07 | End: 2018-03-08

## 2018-03-07 RX ORDER — MORPHINE SULFATE 2 MG/ML
3-5 INJECTION, SOLUTION INTRAMUSCULAR; INTRAVENOUS
Status: DISCONTINUED | OUTPATIENT
Start: 2018-03-07 | End: 2018-03-08

## 2018-03-07 RX ORDER — MIDAZOLAM HYDROCHLORIDE 1 MG/ML
1 INJECTION, SOLUTION INTRAMUSCULAR; INTRAVENOUS
Status: DISCONTINUED | OUTPATIENT
Start: 2018-03-07 | End: 2018-03-08

## 2018-03-07 RX ORDER — ROCURONIUM BROMIDE 10 MG/ML
INJECTION, SOLUTION INTRAVENOUS AS NEEDED
Status: DISCONTINUED | OUTPATIENT
Start: 2018-03-07 | End: 2018-03-07 | Stop reason: HOSPADM

## 2018-03-07 RX ORDER — NITROGLYCERIN 20 MG/100ML
INJECTION INTRAVENOUS
Status: DISCONTINUED | OUTPATIENT
Start: 2018-03-07 | End: 2018-03-07 | Stop reason: HOSPADM

## 2018-03-07 RX ORDER — SODIUM CHLORIDE 0.9 % (FLUSH) 0.9 %
5-10 SYRINGE (ML) INJECTION AS NEEDED
Status: DISCONTINUED | OUTPATIENT
Start: 2018-03-07 | End: 2018-03-08

## 2018-03-07 RX ORDER — LIDOCAINE HYDROCHLORIDE 10 MG/ML
0.1 INJECTION INFILTRATION; PERINEURAL AS NEEDED
Status: DISCONTINUED | OUTPATIENT
Start: 2018-03-07 | End: 2018-03-07 | Stop reason: HOSPADM

## 2018-03-07 RX ORDER — ATORVASTATIN CALCIUM 40 MG/1
80 TABLET, FILM COATED ORAL
Status: DISCONTINUED | OUTPATIENT
Start: 2018-03-07 | End: 2018-03-11 | Stop reason: HOSPADM

## 2018-03-07 RX ORDER — ETOMIDATE 2 MG/ML
INJECTION INTRAVENOUS AS NEEDED
Status: DISCONTINUED | OUTPATIENT
Start: 2018-03-07 | End: 2018-03-07 | Stop reason: HOSPADM

## 2018-03-07 RX ORDER — DEXTROSE 50 % IN WATER (D50W) INTRAVENOUS SYRINGE
25 AS NEEDED
Status: DISCONTINUED | OUTPATIENT
Start: 2018-03-07 | End: 2018-03-08

## 2018-03-07 RX ORDER — ALBUMIN HUMAN 50 G/1000ML
SOLUTION INTRAVENOUS
Status: COMPLETED
Start: 2018-03-07 | End: 2018-03-07

## 2018-03-07 RX ORDER — PAPAVERINE HYDROCHLORIDE 30 MG/ML
INJECTION INTRAMUSCULAR; INTRAVENOUS AS NEEDED
Status: DISCONTINUED | OUTPATIENT
Start: 2018-03-07 | End: 2018-03-07 | Stop reason: HOSPADM

## 2018-03-07 RX ORDER — LIDOCAINE HCL/PF 100 MG/5ML
50-100 SYRINGE (ML) INTRAVENOUS
Status: DISCONTINUED | OUTPATIENT
Start: 2018-03-07 | End: 2018-03-08

## 2018-03-07 RX ORDER — MUPIROCIN 20 MG/G
OINTMENT TOPICAL ONCE
Status: DISCONTINUED | OUTPATIENT
Start: 2018-03-07 | End: 2018-03-07 | Stop reason: HOSPADM

## 2018-03-07 RX ORDER — MIDAZOLAM HYDROCHLORIDE 1 MG/ML
2 INJECTION, SOLUTION INTRAMUSCULAR; INTRAVENOUS
Status: DISCONTINUED | OUTPATIENT
Start: 2018-03-07 | End: 2018-03-07 | Stop reason: HOSPADM

## 2018-03-07 RX ADMIN — ONDANSETRON 4 MG: 2 INJECTION INTRAMUSCULAR; INTRAVENOUS at 21:19

## 2018-03-07 RX ADMIN — VECURONIUM BROMIDE FOR INJECTION 5 MG: 1 INJECTION, POWDER, LYOPHILIZED, FOR SOLUTION INTRAVENOUS at 10:45

## 2018-03-07 RX ADMIN — SODIUM CHLORIDE, SODIUM LACTATE, POTASSIUM CHLORIDE, CALCIUM CHLORIDE: 600; 310; 30; 20 INJECTION, SOLUTION INTRAVENOUS at 07:22

## 2018-03-07 RX ADMIN — OXYCODONE HYDROCHLORIDE AND ACETAMINOPHEN 1 TABLET: 5; 325 TABLET ORAL at 22:02

## 2018-03-07 RX ADMIN — Medication 10 ML: at 16:36

## 2018-03-07 RX ADMIN — OXYCODONE HYDROCHLORIDE AND ACETAMINOPHEN 1 TABLET: 10; 325 TABLET ORAL at 18:39

## 2018-03-07 RX ADMIN — MUPIROCIN: 20 OINTMENT TOPICAL at 21:11

## 2018-03-07 RX ADMIN — TUBERCULIN PURIFIED PROTEIN DERIVATIVE 5 UNITS: 5 INJECTION, SOLUTION INTRADERMAL at 21:03

## 2018-03-07 RX ADMIN — CEFAZOLIN SODIUM 2 G: 1 INJECTION, POWDER, FOR SOLUTION INTRAMUSCULAR; INTRAVENOUS at 11:54

## 2018-03-07 RX ADMIN — SODIUM CHLORIDE 2 UNITS/HR: 900 INJECTION, SOLUTION INTRAVENOUS at 19:18

## 2018-03-07 RX ADMIN — SUFENTANIL CITRATE 50 MCG: 50 INJECTION EPIDURAL; INTRAVENOUS at 09:46

## 2018-03-07 RX ADMIN — VECURONIUM BROMIDE FOR INJECTION 5 MG: 1 INJECTION, POWDER, LYOPHILIZED, FOR SOLUTION INTRAVENOUS at 12:45

## 2018-03-07 RX ADMIN — TRANEXAMIC ACID 1 G: 100 INJECTION, SOLUTION INTRAVENOUS at 08:15

## 2018-03-07 RX ADMIN — ALBUMIN (HUMAN) 25 G: 12.5 INJECTION, SOLUTION INTRAVENOUS at 17:00

## 2018-03-07 RX ADMIN — MIDAZOLAM HYDROCHLORIDE 2 MG: 1 INJECTION, SOLUTION INTRAMUSCULAR; INTRAVENOUS at 07:04

## 2018-03-07 RX ADMIN — AMIODARONE HYDROCHLORIDE 400 MG: 200 TABLET ORAL at 06:04

## 2018-03-07 RX ADMIN — MIDAZOLAM HYDROCHLORIDE 2 MG: 1 INJECTION, SOLUTION INTRAMUSCULAR; INTRAVENOUS at 07:35

## 2018-03-07 RX ADMIN — FAMOTIDINE 20 MG: 10 INJECTION, SOLUTION INTRAVENOUS at 21:03

## 2018-03-07 RX ADMIN — INSULIN GLARGINE 20 UNITS: 100 INJECTION, SOLUTION SUBCUTANEOUS at 21:14

## 2018-03-07 RX ADMIN — VECURONIUM BROMIDE FOR INJECTION 5 MG: 1 INJECTION, POWDER, LYOPHILIZED, FOR SOLUTION INTRAVENOUS at 09:00

## 2018-03-07 RX ADMIN — SODIUM CHLORIDE: 9 INJECTION, SOLUTION INTRAVENOUS at 14:13

## 2018-03-07 RX ADMIN — SODIUM CHLORIDE: 9 INJECTION, SOLUTION INTRAVENOUS at 07:54

## 2018-03-07 RX ADMIN — NITROGLYCERIN 10 MCG/MIN: 20 INJECTION INTRAVENOUS at 08:15

## 2018-03-07 RX ADMIN — MIDAZOLAM HYDROCHLORIDE 3 MG: 1 INJECTION, SOLUTION INTRAMUSCULAR; INTRAVENOUS at 12:45

## 2018-03-07 RX ADMIN — ETOMIDATE 26 MG: 2 INJECTION INTRAVENOUS at 08:00

## 2018-03-07 RX ADMIN — ALBUMIN (HUMAN) 25 G: 12.5 INJECTION, SOLUTION INTRAVENOUS at 19:05

## 2018-03-07 RX ADMIN — CHLORHEXIDINE GLUCONATE 10 ML: 1.2 RINSE ORAL at 16:32

## 2018-03-07 RX ADMIN — PROTAMINE SULFATE 300 MG: 10 INJECTION, SOLUTION INTRAVENOUS at 14:08

## 2018-03-07 RX ADMIN — MIDAZOLAM HYDROCHLORIDE 2 MG: 1 INJECTION, SOLUTION INTRAMUSCULAR; INTRAVENOUS at 10:45

## 2018-03-07 RX ADMIN — ATORVASTATIN CALCIUM 80 MG: 40 TABLET, FILM COATED ORAL at 21:03

## 2018-03-07 RX ADMIN — SODIUM CHLORIDE, SODIUM LACTATE, POTASSIUM CHLORIDE, AND CALCIUM CHLORIDE 75 ML/HR: 600; 310; 30; 20 INJECTION, SOLUTION INTRAVENOUS at 06:05

## 2018-03-07 RX ADMIN — Medication 10 ML: at 21:11

## 2018-03-07 RX ADMIN — AMIODARONE HYDROCHLORIDE 200 MG: 200 TABLET ORAL at 21:03

## 2018-03-07 RX ADMIN — SUFENTANIL CITRATE 100 MCG: 50 INJECTION EPIDURAL; INTRAVENOUS at 08:00

## 2018-03-07 RX ADMIN — SODIUM CHLORIDE 55 ML/HR: 900 INJECTION, SOLUTION INTRAVENOUS at 08:45

## 2018-03-07 RX ADMIN — DEXTROSE MONOHYDRATE, SODIUM CHLORIDE, AND POTASSIUM CHLORIDE 25 ML/HR: 50; 4.5; 1.49 INJECTION, SOLUTION INTRAVENOUS at 16:32

## 2018-03-07 RX ADMIN — FAMOTIDINE 20 MG: 20 TABLET, FILM COATED ORAL at 06:05

## 2018-03-07 RX ADMIN — MIDAZOLAM HYDROCHLORIDE 1 MG: 1 INJECTION, SOLUTION INTRAMUSCULAR; INTRAVENOUS at 07:46

## 2018-03-07 RX ADMIN — MIDAZOLAM HYDROCHLORIDE 2 MG: 1 INJECTION, SOLUTION INTRAMUSCULAR; INTRAVENOUS at 09:12

## 2018-03-07 RX ADMIN — Medication 2 G: at 08:15

## 2018-03-07 RX ADMIN — ROCURONIUM BROMIDE 50 MG: 10 INJECTION, SOLUTION INTRAVENOUS at 08:00

## 2018-03-07 RX ADMIN — Medication 2 G: at 19:19

## 2018-03-07 RX ADMIN — SUFENTANIL CITRATE 25 MCG: 50 INJECTION EPIDURAL; INTRAVENOUS at 09:12

## 2018-03-07 RX ADMIN — SODIUM CHLORIDE 25 ML/HR: 900 INJECTION, SOLUTION INTRAVENOUS at 15:00

## 2018-03-07 RX ADMIN — VECURONIUM BROMIDE FOR INJECTION 2 MG: 1 INJECTION, POWDER, LYOPHILIZED, FOR SOLUTION INTRAVENOUS at 09:55

## 2018-03-07 RX ADMIN — HEPARIN SODIUM 30000 UNITS: 1000 INJECTION, SOLUTION INTRAVENOUS; SUBCUTANEOUS at 10:07

## 2018-03-07 RX ADMIN — SUFENTANIL CITRATE 75 MCG: 50 INJECTION EPIDURAL; INTRAVENOUS at 09:14

## 2018-03-07 RX ADMIN — SODIUM CHLORIDE, SODIUM LACTATE, POTASSIUM CHLORIDE, CALCIUM CHLORIDE: 600; 310; 30; 20 INJECTION, SOLUTION INTRAVENOUS at 07:54

## 2018-03-07 RX ADMIN — VECURONIUM BROMIDE FOR INJECTION 3 MG: 1 INJECTION, POWDER, LYOPHILIZED, FOR SOLUTION INTRAVENOUS at 10:07

## 2018-03-07 NOTE — ANESTHESIA PROCEDURE NOTES
JUAN  Date/Time: 3/7/2018 8:05 AM  Ordering Provider: Geno Nielsen    Procedure Details: probe placement only    Site marked, Timeout performed, 08:05  Risks and benefits discussed with the patient and plans are to proceed    Procedure Note  JUAN probe placed atraumatically. Probe placed per surgeon order for assessment of aortic valve, and ventricular function    Left ventricle:  LVEF = 40-45%, no significant regional wll motion abnormality, mild LVH with inferior wall 1.2 cm, anterior wall 1.3 cm    Aortic valve: Three leaflet, mild to moderate non-rheumatic restriction of leaflet opeing with area by planimetry 1.7 cm^2 and area by continuity equation 1.5 cm^2      Mitral vavle:  Mild myxomatous thickening, trace MR. Transmitral and pulmonary vein Doppler consistent with stage I diastolic dysfunction    Tricuspid:  Trace TR    Atria:  Mild LAE, no ASD or PFO seen    PostBypass:  23 mm biomechanical valve in aortic position, good excursion of all three leaflets, no perivalvular leak noted, max gradient 25 mm Hg with mean gradient 15 mm Hg.   LVEF 45-50% with no RWMA      Performed by: Christina Antony  Authorized by: Christina Antony

## 2018-03-07 NOTE — CONSULTS
Cardiovascular ICU Consult Note: 3/7/2018  Admission Date: 3/7/2018   The patient's chart has been reviewed and the patient has been discussed with nursing staff. Subjective:   Patient is seen at the request of Dr. Pratima Olguin for respiratory management status post cardiac surgery. Patient had CABG X 3 and AVR. He recently underwent left cardiac catheterization yielding severe multivessel CAD after he presented with angina. Today, he was scheduled for revascularization with possible AVR per Dr. Pratima Olguin. He has no known lung disease but does have a hx of tobacco abuse- 1ppd. His pre-op spirometry was normal.  He does not use home oxygen, nebulizers, or CPAP. Currently, is sedated in CV-ICU and orally intubated receiving mechanical ventilation. We have been asked to see in the CV-ICU for mechanical ventilation management and weaning. Review of Systems  Review of systems not obtained due to patient factors. sedated and mechanically ventilated post-op.        Past Medical History:   Diagnosis Date    Acute diastolic CHF (congestive heart failure) ([facility] 1/19/2018    Adverse effect of anesthesia 2003 in [address] [place] [address] Drive    with ankle surg--- pt states woke up during surg and had to have a E.T.  PLACED DUE TO HE \"WAS MOVING ABOUT\"    Claudication of left lower extremity ([address] 1/19/2018    Coronary artery disease involving native coronary artery of native heart 01/18/2018    mi 1/18/18-- stents x 2 placed--- -- followed by dr Shaila Moore Dyslipidemia 01/18/2018    Essential hypertension 01/18/2018    controlled with med    Family history of premature CAD 1/19/2018    Ill-defined condition     wearing life vest---     Snores     per wife-- none since stent placed     STEMI involving left anterior descending coronary artery ([facility] [address] 1/18/2018    Tobacco abuse 1/18/2018     Past Surgical History:   Procedure Laterality Date    CARDIAC SURG PROCEDURE UNLIST  01/18/2018    cath with stents    HX ORTHOPAEDIC Right 01/2003    ankle surg with pinning     Social History     Social History    Marital status:      Spouse name: N/A    Number of children: N/A    Years of education: N/A     Occupational History    Not on file. Social History Main Topics    Smoking status: Former Smoker     Packs/day: 1.00     Years: 40.00     Quit date: 1/18/2018    Smokeless tobacco: Never Used    Alcohol use Yes      Comment: OCC    Drug use: No    Sexual activity: Not on file     Other Topics Concern    Not on file     Social History Narrative     Family History   Problem Relation Age of Onset    Coronary Artery Disease Mother      CABG Age 72    Heart Disease Mother     Coronary Artery Disease Brother      CAD/PTCA stent age 46s    Cancer Father     Heart Disease Maternal Uncle     Heart Disease Paternal Grandmother     Heart Disease Paternal Grandfather      No current facility-administered medications for this encounter. Facility-Administered Medications Ordered in Other Encounters   Medication Dose Route Frequency    0.9% sodium chloride infusion 250 mL  250 mL IntraVENous PRN    0.9% sodium chloride infusion 250 mL  250 mL IntraVENous PRN     Allergies   Allergen Reactions    Adhesive Rash     Patient states skin gets irritated when adhesive is left on him for a period of time. Objective:     Vitals:    03/07/18 1457 03/07/18 1500 03/07/18 1501 03/07/18 1504   BP:    103/53   Pulse: 75 75 74 75   Resp: 16 14 15    Temp: 98.5 °F (36.9 °C)   98.2 °F (36.8 °C)   SpO2:  100% 100% 100%   Weight:       Height:           Intake and Output:      03/07 0701 - 03/07 1900  In: 1970 [I.V.:1500]  Out: 1035 [Urine:975]      Physical Exam:            GEN:  Sedated, intubated and mechanically ventilated.   HEENT:  no alar flaring or epistaxis, oral mucosa moist without cyanosis,   NECK:  no JVD, no retractions, no thyromegaly or masses,   LUNGS: synchronous with vent  Respiratory:  Chest tubes times 3 in epigastric area without air leak. Cardiovascular:  RRR with no M,G,R;  GI:  abdomen soft with no tenderness; positive bowel sounds present  Musculoskeletal:  warm with no cyanosis, no edema. [place] site LE  SKIN:  no jaundice or ecchymosis, midline sternal dressing CDI  NEURO:  sedated, grossly non-focal    Drips- NTG, epinephrine    Lines and Tubes-ET, OG, [place], Arterial Line, [place], Chest Tube    CI: 1.7    Ventilator Settings  Mode FIO2 Rate Tidal Volume Pressure PEEP   SIMV  60 %    500 ml     8 cm H20      Peak airway pressure: 25 cm H2O   Minute ventilation: 7.7 l/min       ABG: No results for input(s): PH, PCO2, PO2, HCO3 in the last 72 hours. LAB  Recent Labs      03/05/18   0827   WBC  7.9   HGB  12.9*   HCT  38.7*   PLT  280     Recent Labs      03/05/18   0827   NA  141   K  4.6   CL  106   CO2  28   GLU  103*   BUN  18   CREA  0.91   INR  1.0         Interpretation: The flow volume loop is normal.  Spirometry is normal.        CHEST XRAY:  ordered      Assessment:     Hospital Problems  Date Reviewed: 3/7/2018          Codes Class Noted POA    Status post coronary artery bypass graft ICD-10-CM: Z95.1  ICD-9-CM: V45.81  3/7/2018 Yes        Encounter for weaning from ventilator [facility]) ICD-10-CM: Z99.11  ICD-9-CM: V46.13  3/7/2018 Yes        Hypoxemia ICD-10-CM: R09.02  ICD-9-CM: 799.02  3/7/2018 Yes        Coronary artery disease involving native coronary artery of native heart (Chronic) ICD-10-CM: I25.10  ICD-9-CM: 414.01  1/18/2018 Yes        Essential hypertension (Chronic) ICD-10-CM: I10  ICD-9-CM: 401.9  1/18/2018 Yes        Tobacco abuse (Chronic) ICD-10-CM: Z72.0  ICD-9-CM: 305.1  1/18/2018 Yes              Plan:      1. Wean mechanical ventilation per protocol  2. Incentive spirometry Q 1 hour once extubated  3. Review CXR         Thank you for this referral.  We appreciate the opportunity to participate in this patient's care. Will follow along with you.             Emory Gerber NP    More than 50% of time documented was spent in face-to-face contact with the patient and in the care of the patient on the floor/unit where the patient is located. Lungs: CTA b/l. No wheezing  Heart S1 and S2 audible, no murmers or rubs appreciated  Other     cxr noted and fine post op. Noted ETT in correct position. I have spoken with and examined the patient. I have reviewed the history, examination, assessment, and plan and agree with the above. Jimi Kaufman MD      This note was signed electronically. Errors are unfortunately her likely due to dictation software.

## 2018-03-07 NOTE — PERIOP NOTES
Updated patient family at 10:54 AM. Spoke with wife and updated for progression of surgery. Received 4 digit code.

## 2018-03-07 NOTE — PROGRESS NOTES
TRANSFER - IN REPORT:    Verbal report received from Trice Jimenez CRNA on Campbell Ballesteros  being received from OR for routine post - op      Report consisted of patients Situation, Background, Assessment and   Recommendations(SBAR). Information from the following report(s) SBAR, Kardex, OR Summary, Procedure Summary, Intake/Output, MAR, Accordion, Recent Results, Med Rec Status and Cardiac Rhythm NSR was reviewed with the receiving nurse. Opportunity for questions and clarification was provided. Assessment completed upon patients arrival to unit and care assumed.

## 2018-03-07 NOTE — BRIEF OP NOTE
BRIEF OPERATIVE NOTE    Date of Procedure: 3/7/2018   Preoperative Diagnosis: Atherosclerosis of native coronary artery of native heart with unstable angina pectoris (HCC) [I25.110]  Acute ST elevation myocardial infarction (STEMI) involving left main coronary artery without development of Q waves (HCC) [I21.01]  Nonrheumatic aortic valve stenosis [I35.0]  Postoperative Diagnosis: Atherosclerosis of native coronary artery of native heart with unstable angina pectoris ([address] [I25.110]    Procedure(s):  CORONARY ARTERY BYPASS GRAFT CABG X 3. LIMA to the LAD, SVG to the L PDA and SVG to the ramus,  AVR using 23 mm Davidson bovine pericardial valve  JUAN/ANES PROBE  DR Jason Gillis FOR ANES    VEIN HARVEST, GREATER SAPHENOUS  Surgeon(s) and Role:     * Hannah Quarles MD - Primary         Assistant Staff: None      Surgical Staff:  Circ-1: Sarah Angel  Circ-Relief: Manju Molina RN  Perfusionist: Jigna Johnson  Scrub Tech-1: Lamonte Roberts  Scrub Tech-Relief: Karen Sharp  Scrub RN-1: Jaydon Monroy RN; Benjamin Chavez RN  Scrub RN-Relief: Staci Napoles RN  Event Time In   Incision Start 7929   Incision Close 1440     Anesthesia: General   Estimated Blood Loss: minimal  Specimens:   ID Type Source Tests Collected by Time Destination   1 : Aortic Valve Preservative Aortic Valve  Hannah Quarles MD 3/7/2018 1200 Pathology      Findings:     Complications:    Implants:   Implant Name Type Inv.  Item Serial No.  Lot No. LRB No. Used Action   VALVE AORT PERIMT MAGNA 23MM -- PERIMOUNT - [id number]   VALVE AORT PERIMT MAGNA 23MM -- PERIMOUNT [id number] DeliveredCIENCES     1 Implanted

## 2018-03-07 NOTE — IP AVS SNAPSHOT
Temo Somers 
 
 
 [address] [facility] 322 [facility] 
[phone] Patient: Moises Estrada MRN: [id number] RT About your hospitalization You were admitted on:  2018 You last received care in the:  [facility] 2 CV STEPDOWN You were discharged on:  2018 Why you were hospitalized Your primary diagnosis was:  Status Post Coronary Artery Bypass Graft Your diagnoses also included:  Coronary Artery Disease Involving Native Coronary Artery Of Native Heart, Essential Hypertension, Tobacco Abuse, Encounter For Weaning From Ventilator ([facility]), Hypoxemia, S/P Avr (Aortic Valve Replacement), Postoperative Anemia Due To Acute Blood Loss, Nonrheumatic Aortic Valve Stenosis, Myocardial Infarction Of Anterior Wall ([facility]), Stemi Involving Left Anterior Descending Coronary Artery (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 
[phone]  35 [facility]  They will call you within 24 hours to schedule your home health visit. [address] [facility] [address] 
[phone] Darion Eduardo MD   [address] [place] [address] [place] [id number] [phone] Thalia Carver MD  Call Monday for one week post CABG follow up. [address] [facility] Cardiology [facility] [address] 
[phone] Jackie Nunez MD  Call monday for two week follow up. [address] [facility] [address] 
[phone]  [facility] On 3/29/2018 8:00 AM [address] 
 [place] [address] 
[phone] Your Scheduled Appointments Thursday March 29, 2018  8:00 AM EDT  
ORIENTATION with Charisma Varela RN  
[facility] ([address] [facility]) [address]  
[phone] Discharge Orders Procedure Order Date Status Priority Quantity Spec Type Associated Dx REFERRAL TO CARDIAC [facility] 03/11/18 0903 Normal Routine 1  STEMI involving left anterior descending coronary artery ([facility] [address] [[id number]] A check isac indicates which time of day the medication should be taken. My Medications START taking these medications Instructions Each Dose to Equal  
 Morning Noon Evening Bedtime  
 acetaminophen 325 mg tablet Commonly known as:  TYLENOL Your next dose is:  As needed Over the counter medicine, take for pain as on label if needed  
     
   
   
   
  
 oxyCODONE-acetaminophen 5-325 mg per tablet Commonly known as:  PERCOCET Your next dose is:  As needed Take 1 Tab by mouth every four (4) hours as needed for Pain. Max Daily Amount: 6 Tabs. 1 Tab  
    
   
   
   
  
 senna-docusate 8.6-50 mg per tablet Commonly known as:  Petra Seals Your next dose is:  As needed Over the counter colace and senakot - take while on narcotics for constipation CHANGE how you take these medications Instructions Each Dose to Equal  
 Morning Noon Evening Bedtime  
 atorvastatin 80 mg tablet Commonly known as:  LIPITOR What changed:  when to take this Your next dose is:  tonight Take 1 Tab by mouth nightly. 80 mg  
    
   
   
   
  
  
 carvedilol 25 mg tablet Commonly known as:  Senia Lawrence What changed:   
- medication strength 
- how much to take - when to take this Your next dose is: This evening Take 1 Tab by mouth two (2) times daily (with meals). 25 mg  
    
   
   
  
   
  
 lisinopril 5 mg tablet Commonly known as:  Elvi Roy What changed:   
- medication strength 
- how much to take Your next dose is:  tomorrow Take 1 Tab by mouth daily. 5 mg CONTINUE taking these medications Instructions Each Dose to Equal  
 Morning Noon Evening Bedtime  
 aspirin 81 mg chewable tablet Your next dose is:  Tomorrow Take 325 mg by mouth daily. 325 mg  
    
  
   
   
   
  
 nitroglycerin 0.4 mg SL tablet Commonly known as:  NITROSTAT Your next dose is:  As needed 1 Tab by SubLINGual route every five (5) minutes as needed for Chest Pain. 0.4 mg  
    
   
   
   
  
  
STOP taking these medications   
 amiodarone 200 mg tablet Commonly known as:  CORDARONE  
   
  
 mupirocin calcium 2 % nasal ointment Commonly known as:  BACTROBAN  
   
  
 ticagrelor 90 mg tablet Commonly known as:  [employer] Where to Get Your Medications Information on where to get these meds will be given to you by the nurse or doctor. ! Ask your nurse or doctor about these medications  
  acetaminophen 325 mg tablet  
 carvedilol 25 mg tablet  
 lisinopril 5 mg tablet  
 oxyCODONE-acetaminophen 5-325 mg per tablet  
 senna-docusate 8.6-50 mg per tablet Discharge Instructions None [facility] Announcement We are excited to announce that we are making your provider's discharge notes available to you in kaufDA. You will see these notes when they are completed and signed by the physician that discharged you from your recent hospital stay. If you have any questions or concerns about any information you see in [employer]t, please call the Health Information Department where you were seen or reach out to your Primary Care Provider for more information about your plan of care. Introducing [facility]! Dear Nikole Nicely: Thank you for requesting a kaufDA account.   Our records indicate that you already have an active HipWay account. You can access your account anytime at [email or web address] Did you know that you can access your hospital and ER discharge instructions at any time in HipWay? You can also review all of your test results from your hospital stay or ER visit. Additional Information If you have questions, please visit the Frequently Asked Questions section of the HipWay website at [email or web address] Remember, HipWay is NOT to be used for urgent needs. For medical emergencies, dial 911. Now available from your iPhone and Android! Providers Seen During Your Hospitalization Provider Specialty Primary office phone Ton Mensah MD Cardiothoracic Surgery [phone] Immunizations Administered for This Admission Name Date  
 TB Skin Test (PPD) Intradermal  Deferred (),  Deferred (), 3/7/2018 Your Primary Care Physician (PCP) Primary Care Physician Office Phone Office Fax Juliette Upton [phone] You are allergic to the following Allergen Reactions Adhesive Rash Patient states skin gets irritated when adhesive is left on him for a period of time. Recent Documentation Height Weight BMI Smoking Status 1.778 m 101.2 kg 32.03 kg/m2 Former Smoker Emergency Contacts Name Discharge Info Relation Home Work Mobile Myrna Keith DISCHARGE CAREGIVER [3] Spouse [3] [phone] Patient Belongings The following personal items are in your possession at time of discharge: 
  Dental Appliances: None  Visual Aid: Glasses      Home Medications: None   Jewelry: None  Clothing: Shirt, Pants, Undergarments, Footwear, Socks    Other Valuables: None Discharge Instructions Attachments/References CORONARY ARTERY BYPASS GRAFT: POST-OP (ENGLISH) HEALTHY DIET: HEART (ENGLISH) HEART ATTACK: MEDICINE TO REDUCE RISK (ENGLISH) SMOKING: STOPPING (ENGLISH) SECONDHAND SMOKE (ENGLISH) OXYCODONE, RAPID RELEASE (BY MOUTH) (ENGLISH) SENNA (BY MOUTH) (ENGLISH) CARDIAC REHABILITATION (ENGLISH) Patient Handouts Coronary Artery Bypass Graft: What to Expect at Home Your Recovery Coronary artery bypass graft (CABG) is surgery to treat coronary artery disease. The surgery helps blood make a detour, or bypass, around one or more narrowed or blocked coronary arteries. Coronary arteries are the blood vessels that bring blood to the heart. Your doctor did the surgery through a cut, called an incision, in your chest. 
You will feel tired and sore for the first few weeks after surgery. You may have some brief, sharp pains on either side of your chest. Your chest, shoulders, and upper back may ache. The incision in your chest and the area where the healthy vein was taken may be sore or swollen. These symptoms usually get better after 4 to 6 weeks. You will probably be able to do many of your usual activities after 4 to 6 weeks. But for 2 to 3 months you will not be able to lift heavy objects or do activities that strain your chest or upper arm muscles. At first you may notice that you get tired easily and need to rest often. It may take 1 to 2 months to get your energy back. Some people find that they are more emotional after this surgery. You may cry easily or show emotion in ways that are unusual for you. This is common and may last for up to a year. Some people get depressed after CABG surgery. Talk with your doctor if you have sadness that continues or you are concerned about how you are feeling. Treatment and other support can help you feel better. Even though the surgery may improve your symptoms, you will still need to make changes in your lifestyle to lower your risk of a heart attack or stroke. It will be important to eat a heart-healthy diet, get regular exercise, not smoke, take your heart medicines, and reduce stress. You will likely start a cardiac rehabilitation (rehab) program in the hospital. You will continue with this rehab program after you go home to help you recover and prevent problems with your heart. Talk to your doctor about whether rehab is right for you. This care sheet gives you a general idea about how long it will take for you to recover. But each person recovers at a different pace. Follow the steps below to get better as quickly as possible. How can you care for yourself at home? Activity ? · Rest when you feel tired. Getting enough sleep will help you recover. Try to sleep on your back for 4 to 6 weeks while your breastbone (sternum) heals. This usually takes about 4 to 6 weeks. ? · Try to walk each day. Start by walking a little more than you did the day before. Bit by bit, increase the amount you walk. Walking boosts blood flow and helps prevent pneumonia and constipation. ? · Avoid strenuous activities, such as bicycle riding, jogging, weight lifting, or heavy aerobic exercise, until your doctor says it is okay. ? · For 3 months, avoid activities that strain your chest or upper arm muscles. This includes pushing a  or vacuum, mopping floors, or swinging a golf club or tennis racquet. ? · For 2 to 3 months, avoid lifting anything that would make you strain. This may include a child, heavy grocery bags and milk containers, a heavy briefcase or backpack, or cat litter or dog food bags. ? · Hold a pillow firmly over your chest incision when you cough or take deep breaths. This will support your chest and reduce your pain. ? · Do breathing exercises at home as instructed by your doctor. This will help prevent pneumonia. ? · Ask your doctor when you can drive again. ? · You will probably need to take 4 to 12 weeks off from work. It depends on the type of work you do and how you feel. ? · You may shower as usual. Pat the incision dry.  Do not take a bath for the first 3 weeks, or until your doctor tells you it is okay. ? · Do not swim or use a hot tub for at least 1 month, or until your doctor says it is okay. ? · Ask your doctor when it is okay for you to have sex. Diet ? · Eat a heart-healthy diet. If you have not been eating this way, talk to your doctor. You also may want to talk to a dietitian. A dietitian can help you learn about healthy foods. ? · Drink plenty of fluids (unless your doctor tells you not to). ? · You may notice that your bowel movements are not regular right after your surgery. This is common. Try to avoid constipation and straining with bowel movements. You may want to take a fiber supplement every day. If you have not had a bowel movement after a couple of days, ask your doctor about taking a mild laxative. Medicines ? · Your doctor will tell you if and when you can restart your medicines. He or she will also give you instructions about taking any new medicines. ? · If you take blood thinners, such as warfarin (Coumadin), clopidogrel (Plavix), or aspirin, be sure to talk to your doctor. He or she will tell you if and when to start taking those medicines again. Make sure that you understand exactly what your doctor wants you to do.  
? · Your doctor may give you medicines to prevent blood clots, keep your heartbeat steady, and lower your blood pressure and cholesterol. Take your medicines exactly as prescribed. Call your doctor if you think you are having a problem with your medicine. ? · Be safe with medicines. Take pain medicines exactly as directed. ¨ If the doctor gave you a prescription medicine for pain, take it as prescribed. ¨ If you are not taking a prescription pain medicine, ask your doctor if you can take an over-the-counter medicine. ¨ Do not take aspirin, ibuprofen (Advil, Motrin), naproxen (Aleve), or other nonsteroidal anti-inflammatory drugs (NSAIDs) unless your doctor says it is okay. ? · If you think your pain medicine is making you sick to your stomach: 
¨ Take your medicine after meals (unless your doctor has told you not to). ¨ Ask your doctor for a different pain medicine. ? · If your doctor prescribed antibiotics, take them as directed. Do not stop taking them just because you feel better. You need to take the full course of antibiotics. Incision care ? · If you have strips of tape on the incisions the doctor made, leave the tape on for a week or until it falls off.  
? · Wash the area daily with warm, soapy water, and pat it dry. Don't use hydrogen peroxide or alcohol, which can slow healing. You may cover the area with a gauze bandage if it weeps or rubs against clothing. Change the bandage every day. ? · Keep the area clean and dry. ? · If you have an incision in your leg: ¨ Wear support stockings on your legs during the day for the first 2 weeks. You can take the stockings off at night while you sleep. ¨ Raise your legs above the level of your heart whenever you lay down for the first 4 to 6 weeks. Other instructions ? · Keep track of your weight. Weigh yourself every day at the same time of day, on the same scale, in the same amount of clothing. A sudden increase in weight can be a sign of a problem with your heart. Tell your doctor if you suddenly gain weight, such as 3 pounds or more in 2 to 3 days. ? · Do not smoke. Smoking can make it harder for you to recover and it will raise the chances of your arteries narrowing again. If you need help quitting, talk to your doctor about stop-smoking programs and medicines. These can increase your chances of quitting for good. Follow-up care is a key part of your treatment and safety. Be sure to make and go to all appointments, and call your doctor if you are having problems. It's also a good idea to know your test results and keep a list of the medicines you take. When should you call for help? Call 911 anytime you think you may need emergency care. For example, call if: 
? · You passed out (lost consciousness). ? · You have severe trouble breathing. ? · You have sudden chest pain and shortness of breath, or you cough up blood. ? · You have severe pain in your chest.  
? · You have symptoms of a heart attack. These may include: ¨ Chest pain or pressure, or a strange feeling in the chest. 
¨ Sweating. ¨ Shortness of breath. ¨ Nausea or vomiting. ¨ Pain, pressure, or a strange feeling in the back, neck, jaw, or upper belly or in one or both shoulders or arms. ¨ Lightheadedness or sudden weakness. ¨ A fast or irregular heartbeat. After you call 911, the  may tell you to chew 1 adult-strength or 2 to 4 low-dose aspirin. Wait for an ambulance. Do not try to drive yourself. ? · You have angina symptoms (such as chest pain or pressure) that do not go away with rest or are not getting better within 5 minutes after you take a dose of nitroglycerin. ?Call your doctor now or seek immediate medical care if: 
? · You have pain that does not get better after you take pain medicine. ? · You have a fever over 100°F.  
? · You have loose stitches, or your incision comes open. ? · Bright red blood has soaked through the bandage over your incision. ? · You have signs of infection, such as: 
¨ Increased pain, swelling, warmth, or redness. ¨ Red streaks leading from the incision. ¨ Pus draining from the incision. ¨ Swollen lymph nodes in your neck, armpits, or groin. ¨ A fever. ? · You have signs of a blood clot in a leg. If you had a vein removed from your leg, you may have tenderness and swelling while your leg heals. But signs of a blood clot may be in a different part of your leg and may include: 
¨ Pain in your calf, back of the knee, thigh, or groin. ¨ Redness and swelling in your leg or groin. ? · Your heartbeat feels very fast or slow, skips beats, or flutters. ? · You are dizzy or lightheaded, or you feel like you may faint. ? · You have new or increased shortness of breath. ? Watch closely for changes in your health, and be sure to contact your doctor if: 
? · You gain weight suddenly, such as 3 pounds or more in 2 to 3 days. ? · You have increased swelling in your legs, ankles, or feet. ? · You have any concerns about your incision. ? · You feel very sad or have other signs of depression, such as trouble sleeping or eating. ? · You have questions about diet, exercise, quitting smoking, or stress reduction after surgery. Where can you learn more? Go to http://key-mark.info/. Enter F759 in the search box to learn more about \"Coronary Artery Bypass Graft: What to Expect at Home. \" Current as of: September 21, 2016 Content Version: 11.4 © 2276-4804 [employer]. Care instructions adapted under license by [employer] (which disclaims liability or warranty for this information). If you have questions about a medical condition or this instruction, always ask your healthcare professional. [place] [address] any warranty or liability for your use of this information. Heart-Healthy Diet: Care Instructions Your Care Instructions A heart-healthy diet has lots of vegetables, fruits, nuts, beans, and whole grains, and is low in salt. It limits foods that are high in saturated fat, such as meats, cheeses, and fried foods. It may be hard to change your diet, but even small changes can lower your risk of heart attack and heart disease. Follow-up care is a key part of your treatment and safety. Be sure to make and go to all appointments, and call your doctor if you are having problems. It's also a good idea to know your test results and keep a list of the medicines you take. How can you care for yourself at home? Watch your portions · Learn what a serving is. A \"serving\" and a \"portion\" are not always the same thing. Make sure that you are not eating larger portions than are recommended. For example, a serving of pasta is ½ cup. A serving size of meat is 2 to 3 ounces. A 3-ounce serving is about the size of a deck of cards. Measure serving sizes until you are good at [place]" them. Keep in mind that restaurants often serve portions that are 2 or 3 times the size of one serving. · To keep your energy level up and keep you from feeling hungry, eat often but in smaller portions. · Eat only the number of calories you need to stay at a healthy weight. If you need to lose weight, eat fewer calories than your body burns (through exercise and other physical activity). Eat more fruits and vegetables · Eat a variety of fruit and vegetables every day. Dark green, deep orange, red, or yellow fruits and vegetables are especially good for you. Examples include spinach, carrots, peaches, and berries. · Keep carrots, celery, and other veggies handy for snacks. Buy fruit that is in season and store it where you can see it so that you will be tempted to eat it. · Cook dishes that have a lot of veggies in them, such as stir-fries and soups. Limit saturated and trans fat · Read food labels, and try to avoid saturated and trans fats. They increase your risk of heart disease. Trans fat is found in many processed foods such as cookies and crackers. · Use olive or canola oil when you cook. Try cholesterol-lowering spreads, such as Benecol or Take Control. · Bake, broil, grill, or steam foods instead of frying them. · Choose lean meats instead of high-fat meats such as hot dogs and sausages. Cut off all visible fat when you prepare meat. · Eat fish, skinless poultry, and meat alternatives such as soy products instead of high-fat meats. Soy products, such as tofu, may be especially good for your heart. · Choose low-fat or fat-free milk and dairy products. Eat fish · Eat at least two servings of fish a week. Certain fish, such as salmon and tuna, contain omega-3 fatty acids, which may help reduce your risk of heart attack. Eat foods high in fiber · Eat a variety of grain products every day. Include whole-grain foods that have lots of fiber and nutrients. Examples of whole-grain foods include oats, whole wheat bread, and brown rice. · Buy whole-grain breads and cereals, instead of white bread or pastries. Limit salt and sodium · Limit how much salt and sodium you eat to help lower your blood pressure. · Taste food before you salt it. Add only a little salt when you think you need it. With time, your taste buds will adjust to less salt. · Eat fewer snack items, fast foods, and other high-salt, processed foods. Check food labels for the amount of sodium in packaged foods. · Choose low-sodium versions of canned goods (such as soups, vegetables, and beans). Limit sugar · Limit drinks and foods with added sugar. These include candy, desserts, and soda pop. Limit alcohol · Limit alcohol to no more than 2 drinks a day for men and 1 drink a day for women. Too much alcohol can cause health problems. When should you call for help? Watch closely for changes in your health, and be sure to contact your doctor if: 
? · You would like help planning heart-healthy meals. Where can you learn more? Go to http://key-mark.info/. Enter V137 in the search box to learn more about \"Heart-Healthy Diet: Care Instructions. \" Current as of: September 21, 2016 Content Version: 11.4 © 1506-7893 [employer]. Care instructions adapted under license by [employer] (which disclaims liability or warranty for this information).  If you have questions about a medical condition or this instruction, always ask your healthcare professional. Hemalatha Roldan [facility] disclaims any warranty or liability for your use of this information. Reducing Heart Attack Risk With Daily Medicine: Care Instructions Your Care Instructions Heart disease is the number one cause of death. If you are at risk for heart disease, there are many medicines that can reduce your risk. These include: · ACE inhibitors. These are a type of blood pressure medicine. They can reduce the risk of heart attacks and strokes if you are at high risk. · Statin medicines. These lower cholesterol. They can also reduce the risk of heart disease and strokes. · Aspirin. It can help certain people lower their risk of a heart attack or stroke. · Beta-blocker medicines. These are a type of blood pressure and heart medicine. They can reduce the chance of early death if you have had a heart attack. All medicines can cause side effects. So it is important to understand the pros and cons of any medicine you take. It is also important to take your medicines exactly as your doctor tells you to. Follow-up care is a key part of your treatment and safety. Be sure to make and go to all appointments, and call your doctor if you are having problems. It's also a good idea to know your test results and keep a list of the medicines you take. ACE inhibitors ACE (angiotensin-converting enzyme) inhibitors are used for three main reasons. They lower blood pressure, protect the kidneys, and prevent heart attacks and strokes. Examples include benazepril (Lotensin), lisinopril (Prinivil, Zestril), and ramipril (Altace). Before you start taking an ACE inhibitor, make sure your doctor knows if: 
· You are taking a water pill (diuretic). · You are taking potassium pills or using salt substitutes. · You are pregnant or breastfeeding. · You have had a kidney transplant or other kidney problems. ACE inhibitors can cause side effects. Call your doctor right away if you have: · Trouble breathing. · Swelling in your face, head, neck, or tongue. · Dizziness or lightheadedness. · A dry cough. Statins Statins lower cholesterol. Examples include atorvastatin (Lipitor), lovastatin (Mevacor), pravastatin (Pravachol), and simvastatin (Zocor). Before you start taking a statin, make sure your doctor knows if: 
· You have had a kidney transplant or other kidney problems. · You have liver disease. · You take any other prescription medicine, over-the-counter medicine, vitamins, supplements, or herbal remedies. · You are pregnant or breastfeeding. Statins can cause side effects. Call your doctor right away if you have: · New, severe muscle aches. · Brown urine. Aspirin Taking an aspirin every day can lower your risk for a heart attack. A heart attack occurs when a blood vessel in the heart gets blocked. When this happens, oxygen can't get to the heart muscle, and part of the heart dies. Aspirin can help prevent blood clots that can block the blood vessels. Talk to your doctor before you start taking aspirin every day. He or she may recommend that you take one low-dose aspirin (81 mg) tablet each day, with a meal and a full glass of water. Taking aspirin isn't right for everyone. This is because it can cause serious bleeding. And you may not be able to use aspirin if you: 
· Have asthma. · Have an ulcer or other stomach problem. · Take some other medicine (called a blood thinner) that prevents blood clots. · Are allergic to aspirin. Before having a surgery or procedure, tell your doctor or dentist that you take aspirin. He or she will tell you if you should stop taking aspirin beforehand. Make sure that you understand exactly what your doctor wants you to do. Aspirin can cause side effects. Call your doctor right away if you have: · Unusual bleeding or bruising. · Nausea, vomiting, or heartburn. · Black or bloody stools. Beta-blockers Beta-blockers are used for three main reasons. They lower blood pressure, relieve angina symptoms (such as chest pain or pressure), and reduce the chances of a second heart attack. They include atenolol (Tenormin), carvedilol (Coreg), and metoprolol (Lopressor). Before you start taking a beta-blocker, make sure your doctor knows if you have: · Severe asthma or frequent asthma attacks. · A very slow pulse (less than 55 beats a minute). Beta-blockers can cause side effects. Call your doctor right away if you have: · Wheezing or trouble breathing. · Dizziness or lightheadedness. · Asthma that gets worse. When should you call for help? Watch closely for changes in your health, and be sure to contact your doctor if you have any problems. Where can you learn more? Go to http://key-mark.info/. Enter R428 in the search box to learn more about \"Reducing Heart Attack Risk With Daily Medicine: Care Instructions. \" Current as of: September 21, 2016 Content Version: 11.4 © 3050-0601 [employer]. Care instructions adapted under license by [facility] (which disclaims liability or warranty for this information). If you have questions about a medical condition or this instruction, always ask your healthcare professional. [address] any warranty or liability for your use of this information. Stopping Smoking: Care Instructions Your Care Instructions Cigarette smokers crave the nicotine in cigarettes. Giving it up is much harder than simply changing a habit. Your body has to stop craving the nicotine. It is hard to quit, but you can do it. There are many tools that people use to quit smoking. You may find that combining tools works best for you. There are several steps to quitting. First you get ready to quit. Then you get support to help you.  After that, you learn new skills and behaviors to become a nonsmoker. For many people, a necessary step is getting and using medicine. Your doctor will help you set up the plan that best meets your needs. You may want to attend a smoking cessation program to help you quit smoking. When you choose a program, look for one that has proven success. Ask your doctor for ideas. You will greatly increase your chances of success if you take medicine as well as get counseling or join a cessation program. 
Some of the changes you feel when you first quit tobacco are uncomfortable. Your body will miss the nicotine at first, and you may feel short-tempered and grumpy. You may have trouble sleeping or concentrating. Medicine can help you deal with these symptoms. You may struggle with changing your smoking habits and rituals. The last step is the tricky one: Be prepared for the smoking urge to continue for a time. This is a lot to deal with, but keep at it. You will feel better. Follow-up care is a key part of your treatment and safety. Be sure to make and go to all appointments, and call your doctor if you are having problems. It's also a good idea to know your test results and keep a list of the medicines you take. How can you care for yourself at home? · Ask your family, friends, and coworkers for support. You have a better chance of quitting if you have help and support. · Join a support group, such as Nicotine Anonymous, for people who are trying to quit smoking. · Consider signing up for a smoking cessation program, such as the American [employer] Association's Freedom from Smoking program. 
· Set a quit date. Pick your date carefully so that it is not right in the middle of a big deadline or stressful time. Once you quit, do not even take a puff. Get rid of all ashtrays and lighters after your last cigarette. Clean your house and your clothes so that they do not smell of smoke. · Learn how to be a nonsmoker.  Think about ways you can avoid those things that make you reach for a cigarette. ¨ Avoid situations that put you at greatest risk for smoking. For some people, it is hard to have a drink with friends without smoking. For others, they might skip a coffee break with coworkers who smoke. ¨ Change your daily routine. Take a different route to work or eat a meal in a different place. · Cut down on stress. Calm yourself or release tension by doing an activity you enjoy, such as reading a book, taking a hot bath, or gardening. · Talk to your doctor or pharmacist about nicotine replacement therapy, which replaces the nicotine in your body. You still get nicotine but you do not use tobacco. Nicotine replacement products help you slowly reduce the amount of nicotine you need. These products come in several forms, many of them available over-the-counter: ¨ Nicotine patches ¨ Nicotine gum and lozenges ¨ Nicotine inhaler · Ask your doctor about bupropion (Wellbutrin) or varenicline (Chantix), which are prescription medicines. They do not contain nicotine. They help you by reducing withdrawal symptoms, such as stress and anxiety. · Some people find hypnosis, acupuncture, and massage helpful for ending the smoking habit. · Eat a healthy diet and get regular exercise. Having healthy habits will help your body move past its craving for nicotine. · Be prepared to keep trying. Most people are not successful the first few times they try to quit. Do not get mad at yourself if you smoke again. Make a list of things you learned and think about when you want to try again, such as next week, next month, or next year. Where can you learn more? Go to http://key-mark.info/. Enter B989 in the search box to learn more about \"Stopping Smoking: Care Instructions. \" Current as of: March 20, 2017 Content Version: 11.4 © 0135-1241 [employer].  Care instructions adapted under license by [address] (which disclaims liability or warranty for this information). If you have questions about a medical condition or this instruction, always ask your healthcare professional. [address] any warranty or liability for your use of this information. Secondhand Smoke: Care Instructions Your Care Instructions Secondhand smoke comes from the burning end of a cigarette, cigar, or pipe and the smoke that a smoker exhales. The smoke contains nicotine and many other harmful chemicals. Breathing secondhand smoke can cause or worsen health problems including cancer, asthma, coronary artery disease, and respiratory infections. It can make your eyes and nose burn and cause a sore throat. Secondhand smoke is especially bad for babies and young children whose lungs are still developing. Babies whose parents smoke are more likely to have ear infections, pneumonia, and bronchitis in the first few years of their lives. Secondhand smoke can make asthma symptoms worse in children. If you are pregnant, it is important that you not smoke and that you avoid secondhand smoke. You are more likely to give birth to a baby who weighs less than expected (low birth weight) if you smoke. And your baby may have a greater risk for sudden infant death syndrome (SIDS). Babies whose mothers are exposed to secondhand smoke during pregnancy have a higher risk for health problems. Follow-up care is a key part of your treatment and safety. Be sure to make and go to all appointments, and call your doctor if you are having problems. It's also a good idea to know your test results and keep a list of the medicines you take. How can you care for yourself at home? · Do not smoke or let anyone else smoke in your home. If people must smoke, ask them to go outside. · If people do smoke in your home, choose a room where you can open a window or use a fan to get the smoke outside. · Do not let anyone smoke in your car. If someone must smoke, pull over in a safe place and let him or her smoke away from the car. · Ask your employer to make sure that you have a smoke-free work area. · Make sure that your children are not exposed to secondhand smoke at day care, school, and after-school programs. · Try to choose nonsmoking bars, restaurants, and other public places when you go out. · Help your family and friends who smoke to quit by encouraging them to try. Tell them about treatment resources. Having support from others often helps. · If you smoke, quit. Quitting is hard, but there are ways to boost your chance of quitting tobacco for good. ¨ Use nicotine gum, patches, or lozenges. Call a quitline. Ask your doctor about stop-smoking programs and medicines. ¨ Keep trying. When should you call for help? Watch closely for changes in your health, and be sure to contact your doctor if you have any problems. Where can you learn more? Go to http://key-mark.info/. Enter L004 in the search box to learn more about \"Secondhand Smoke: Care Instructions. \" Current as of: March 20, 2017 Content Version: 11.4 © 5025-9615 [employer]. Care instructions adapted under license by [employer] (which disclaims liability or warranty for this information). If you have questions about a medical condition or this instruction, always ask your healthcare professional. [place] [address] any warranty or liability for your use of this information. Oxycodone, Rapid Release (By mouth) Oxycodone Hydrochloride (cv-v-GSY-done melissa-droe-KLOR-bing) Treats moderate to severe pain. This medicine is a narcotic pain reliever. Brand Name(s): Oxaydo, Oxy IR, Roxicodone There may be other brand names for this medicine. When This Medicine Should Not Be Used: This medicine is not right for everyone.  Do not use it if you had an allergic reaction to oxycodone, codeine, hydrocodone, dihydrocodeine, or morphine, or you have a stomach or bowel blockage. How to Use This Medicine:  
Capsule, Liquid, Tablet · Take your medicine as directed. Your dose may need to be changed several times to find what works best for you. · An overdose can be dangerous. Follow directions carefully so you do not get too much medicine at one time. · Oral liquid: Measure the oral liquid medicine with a marked measuring spoon, oral syringe, or medicine cup. · Oxaydo® tablet: Swallow it whole with enough water to swallow it completely. Do not break, crush, chew, or dissolve it. Do not wet the tablet before you put it in your mouth. · This medicine should come with a Medication Guide. Ask your pharmacist for a copy if you do not have one. · Missed dose: Take a dose as soon as you remember. If it is almost time for your next dose, wait until then and take a regular dose. Do not take extra medicine to make up for a missed dose. · Store the medicine in a closed container at room temperature, away from heat, moisture, and direct light. Store the medicine in a secure place to prevent others from getting it. Ask your pharmacist about the best way to dispose of medicine you do not use. Drugs and Foods to Avoid: Ask your doctor or pharmacist before using any other medicine, including over-the-counter medicines, vitamins, and herbal products. · Do not use this medicine if you are using or have used an MAO inhibitor within the past 14 days. · Some medicines can affect how oxycodone works. Tell your doctor if you are using any of the following: ¨ Amiodarone, carbamazepine, erythromycin, ketoconazole, phenytoin, quinidine, rifampin, ritonavir ¨ Diuretic (water pill) ¨ Medicine to treat depression or anxiety ¨ Medicine to treat migraine headaches ¨ Phenothiazine medicine · Tell your doctor if you use anything else that makes you sleepy.  Some examples are allergy medicine, narcotic pain medicine, and alcohol. Tell your doctor if you are using buprenorphine, butorphanol, nalbuphine, pentazocine, or a muscle relaxer. · Do not drink alcohol while you are using this medicine. Warnings While Using This Medicine: · Tell your doctor if you are pregnant or breastfeeding, or if you have kidney disease, liver disease, heart disease, low blood pressure, lung disease or breathing problems (such as asthma, COPD), scoliosis, an enlarged prostate or trouble urinating, an underactive thyroid, Winn disease, gallbladder or pancreas problems, or digestion problems. Tell your doctor if you have a history of head injury, brain tumor, mental health problems, seizures, or alcohol or drug addiction. · This medicine may cause the following problems: 
¨ High risk of overdose, which can lead to death ¨ Respiratory depression (serious breathing problem that can be life-threatening) ¨ Serotonin syndrome, when used with certain medicines · This medicine may make you dizzy, drowsy, or faint. Do not drive or do anything else that could be dangerous until you know how this medicine affects you. Sit or lie down if you feel dizzy. Stand up carefully. · This medicine can be habit-forming. Do not use more than your prescribed dose. Call your doctor if you think your medicine is not working. · Do not stop using this medicine suddenly. Your doctor will need to slowly decrease your dose before you stop it completely. · This medicine may cause constipation, especially with long-term use. Ask your doctor if you should use a laxative to prevent and treat constipation. Drink plenty of liquids to help avoid constipation. · This medicine could cause infertility. Talk with your doctor before using this medicine if you plan to have children. · Keep all medicine out of the reach of children. Never share your medicine with anyone. Possible Side Effects While Using This Medicine: Call your doctor right away if you notice any of these side effects: · Allergic reaction: Itching or hives, swelling in your face or hands, swelling or tingling in your mouth or throat, chest tightness, trouble breathing · Anxiety, restlessness, fast heartbeat, fever, sweating, muscle spasms, twitching, nausea, vomiting, diarrhea, seeing or hearing things that are not there · Blue lips, fingernails, or skin, trouble breathing · Extreme dizziness or weakness, shallow breathing, slow heartbeat, sweating, cold or clammy skin, seizures · Lightheadedness, dizziness, fainting · Severe constipation, stomach pain If you notice these less serious side effects, talk with your doctor: · Mild constipation · Sleepiness, tiredness If you notice other side effects that you think are caused by this medicine, tell your doctor. Call your doctor for medical advice about side effects. You may report side effects to FDA at 5-001-FDA-7045 © 2017 [address] Information is for End User's use only and may not be sold, redistributed or otherwise used for commercial purposes. The above information is an  only. It is not intended as medical advice for individual conditions or treatments. Talk to your doctor, nurse or pharmacist before following any medical regimen to see if it is safe and effective for you. Senna (By mouth) Senna (SEN-a) Relieves occasional constipation. Brand Name(s):  
There may be other brand names for this medicine. When This Medicine Should Not Be Used: This medicine is generally considered safe for most people. Talk to your doctor if you have concerns. How to Use This Medicine:  
Liquid, Powder, Tablet, Chewable Tablet · Your doctor will tell you how much medicine to use. Do not use more than directed. This medicine causes bowel movement in 6 to 12 hours.  
· Oral liquid: Measure the oral liquid medicine with a marked measuring spoon, oral syringe, or medicine cup. · Follow the instructions on the medicine label if you are using this medicine without a prescription. · Store the medicine in a closed container at room temperature, away from heat, moisture, and direct light. Drugs and Foods to Avoid: Ask your doctor or pharmacist before using any other medicine, including over-the-counter medicines, vitamins, and herbal products. Warnings While Using This Medicine: · Tell your doctor if you are pregnant or breastfeeding, or if you have kidney disease or liver disease. · Before you use this medicine, tell your doctor if you have stomach pain, nausea, vomiting. Tell him if you have already used a laxative for more than 1 week, or if you have had changes in your bowel movements recently. · Keep all medicine out of the reach of children. Never share your medicine with anyone. Possible Side Effects While Using This Medicine:  
Call your doctor right away if you notice any of these side effects: · Allergic reaction: Itching or hives, swelling in your face or hands, swelling or tingling in your mouth or throat, chest tightness, trouble breathing · Black, tarry stools · Stomach pain, nausea or vomiting If you notice other side effects that you think are caused by this medicine, tell your doctor. Call your doctor for medical advice about side effects. You may report side effects to FDA at 4-518-FDA-0208 © 2017 [address] Information is for End User's use only and may not be sold, redistributed or otherwise used for commercial purposes. The above information is an  only. It is not intended as medical advice for individual conditions or treatments. Talk to your doctor, nurse or pharmacist before following any medical regimen to see if it is safe and effective for you. Cardiac Rehabilitation: Care Instructions Your Care Instructions Cardiac rehabilitation is a program for people who have a heart problem, such as a heart attack, heart failure, or a heart valve disease. The program includes exercise, lifestyle changes, education, and emotional support. Cardiac rehab can help you improve the quality of your life through better overall health. It can help you lose weight and feel better about yourself. On your cardiac rehab team, you may have your doctor, a nurse specialist, a dietitian, and a physical therapist. They will design your cardiac rehab program specifically for you. You will learn how to reduce your risk for heart problems, how to manage stress, and how to eat a heart-healthy diet. By the end of the program, you will be ready to maintain a healthier lifestyle on your own. Follow-up care is a key part of your treatment and safety. Be sure to make and go to all appointments, and call your doctor if you are having problems. It's also a good idea to know your test results and keep a list of the medicines you take. How can you care for yourself at home? · Take your medicines exactly as prescribed. Call your doctor if you think you are having a problem with your medicine. You will get more details on the specific medicines your doctor prescribes. · Weigh yourself every day if your doctor tells you to. Watch for sudden weight gain. Weigh yourself on the same scale with the same amount of clothing at the same time of day. · Plan your meals so that you are eating heart-healthy foods. ¨ Eat a variety of foods daily. Fresh fruits and vegetables and whole-grains are good choices. ¨ Limit your fat intake, especially saturated and trans fat. ¨ Limit salt (sodium). ¨ Increase fiber in your diet. ¨ Limit alcohol. · Learn how to take your pulse so that you can track your heart rate during exercise.  
· Always check with your doctor before you begin a new exercise program. 
· Warm up before you exercise and cool down afterward for at least 15 minutes each. This will help your heart gradually prepare for and recover from exercise and avoid pushing your heart too hard. · Stop exercising if you have any unusual discomfort, such as chest pain. · Do not smoke. Smoking can make heart problems worse. If you need help quitting, talk to your doctor about stop-smoking programs and medicines. These can increase your chances of quitting for good. When should you call for help? Call 911 anytime you think you may need emergency care. For example, call if: 
? · You have severe trouble breathing. ? · You cough up pink, foamy mucus and you have trouble breathing. ? · You have symptoms of a heart attack. These may include: ¨ Chest pain or pressure, or a strange feeling in the chest. 
¨ Sweating. ¨ Shortness of breath. ¨ Nausea or vomiting. ¨ Pain, pressure, or a strange feeling in the back, neck, jaw, or upper belly or in one or both shoulders or arms. ¨ Lightheadedness or sudden weakness. ¨ A fast or irregular heartbeat. After you call 911, the  may tell you to chew 1 adult-strength or 2 to 4 low-dose aspirin. Wait for an ambulance. Do not try to drive yourself. ? · You have angina symptoms (such as chest pain or pressure) that do not go away with rest or are not getting better within 5 minutes after you take a dose of nitroglycerin. ? · You have symptoms of a stroke. These may include: 
¨ Sudden numbness, tingling, weakness, or loss of movement in your face, arm, or leg, especially on only one side of your body. ¨ Sudden vision changes. ¨ Sudden trouble speaking. ¨ Sudden confusion or trouble understanding simple statements. ¨ Sudden problems with walking or balance. ¨ A sudden, severe headache that is different from past headaches. ? · You passed out (lost consciousness). ?Call your doctor now or seek immediate medical care if: 
? · You have new or increased shortness of breath. ? · You are dizzy or lightheaded, or you feel like you may faint. ? · You gain weight suddenly, such as more than 2 to 3 pounds in a day or 5 pounds in a week. (Your doctor may suggest a different range of weight gain.) ? · You have increased swelling in your legs, ankles, or feet. ? Watch closely for changes in your health, and be sure to contact your doctor if you have any problems. Where can you learn more? Go to http://key-mark.info/. Enter L117 in the search box to learn more about \"Cardiac Rehabilitation: Care Instructions. \" Current as of: September 21, 2016 Content Version: 11.4 © 6339-7918 [employer]. Care instructions adapted under license by [employer] (which disclaims liability or warranty for this information). If you have questions about a medical condition or this instruction, always ask your healthcare professional. [address] any warranty or liability for your use of this information. Please provide this summary of care documentation to your next provider. Signatures-by signing, you are acknowledging that this After Visit Summary has been reviewed with you and you have received a copy. Patient Signature:  ____________________________________________________________ Date:  ____________________________________________________________  
  
Gasper November Provider Signature:  ____________________________________________________________ Date:  ____________________________________________________________

## 2018-03-07 NOTE — PERIOP NOTES
TRANSFER - OUT REPORT:    Verbal report given to Jackelin Schmitz on Sabrina Ortiz  being transferred to CVICU for routine progression of care       Report consisted of patients Situation, Background, Assessment and   Recommendations(SBAR). Information from the following report(s) OR Summary was reviewed with the receiving nurse. Lines:   Double Lumen 03/07/18 Right Internal jugular (Active)       Shelby Seals Dual 03/07/18 Right Neck (Active)       Peripheral IV 03/07/18 Right Hand (Active)   Site Assessment Clean, dry, & intact 3/7/2018  6:05 AM   Phlebitis Assessment 0 3/7/2018  6:05 AM   Infiltration Assessment 0 3/7/2018  6:05 AM   Dressing Status Clean, dry, & intact 3/7/2018  6:05 AM   Dressing Type Tape;Transparent 3/7/2018  6:05 AM   Hub Color/Line Status Green; Infusing 3/7/2018  6:05 AM       Arterial Line 03/07/18 Left Radial artery (Active)        Opportunity for questions and clarification was provided.       Patient transported with:   Monitor  O2 @ 15 liters  Registered Nurse   CRNA

## 2018-03-07 NOTE — PERIOP NOTES
Updated patient family at 12:20 PM. Spoke with wife for update on surgery progression. Received 4 digit code.

## 2018-03-07 NOTE — PROGRESS NOTES
Ventilator check complete; patient has a #8. 0 ET tube secured at the 21 at the lip. Patient is sedated. Patient is not able to follow commands. Breath sounds are diminished. Trachea is midline, Negative for subcutaneous air, and chest excursion is symmetric. Patient is also Negative for cyanosis and is Negative for pitting edema. All alarms are set and audible. Resuscitation bag is at the head of the bed. Ventilator Settings  Mode FIO2 Rate Tidal Volume Pressure PEEP I:E Ratio                           Peak airway pressure:     Minute ventilation:       ABG: No results for input(s): PH, PCO2, PO2, HCO3 in the last 72 hours.       Jerry Ruth, RT

## 2018-03-07 NOTE — PROGRESS NOTES
Consult vascular surgery per Dr. García Montilla for carotid stenosis. Order placed.   Will contact on call MD in AM.

## 2018-03-07 NOTE — ANESTHESIA PROCEDURE NOTES
Pulmonary Artery Catheter Placement    Start time: 3/7/2018 7:35 AM  End time: 3/7/2018 7:50 AM  Performed by: Surjit Pendleton by: Mahesh Key     Preanesthetic Checklist: patient identified, risks and benefits discussed, anesthesia consent, site marked, patient being monitored and timeout performed    Timeout Time: 07:35       Pre-procedure: All elements of maximal sterile barrier technique followed? Yes    2% Chlorhexidine for cutaneous antisepsis, Hand hygiene performed prior to catheter insertion and Ultrasound guidance    Sterile Ultrasound Technique followed?: Yes        Ultrasound Image Stored? Image stored    Procedure:   Prep:  Chlorhexidine  Location:  Internal jugular  Orientation:  Right  Patient position:  Trendelenburg  Number of lumens: 9 Fr double sideport PA intorducer. Catheter size: 7.5 Fr CCO PA catheter passed to PAOP at 48 cm.     Number of attempts:  1  Successful placement: Yes      Assessment:   Post-procedure:  Catheter secured and sterile dressing applied  Assessment:  Blood return through all ports, free fluid flow and guidewire removal verified  Insertion:  Uncomplicated  Patient tolerance:  Patient tolerated the procedure well with no immediate complications

## 2018-03-07 NOTE — PROGRESS NOTES
Respiratory Mechanics completed and are as follows:  Weaning Parameters  Spontaneous Breathing Trial Complete: Yes  Resp Rate Observed: 19  Ve: 9.9  VT: 510  RSBI: 26  NIF: -30  Patient extubated to a 40% airvoL NC. Patient is able to communicate and is negative for stridor. Breath sounds are clear. No complications with extubation.      Stephanie Myers, RT

## 2018-03-07 NOTE — PROGRESS NOTES
RT at bedside. Pt able to meet requirements for extubation (physical mechanics and NIF). Procedure explained to Pt. Pt nodded understanding. VSS. Pt extubated at this time. Mouth and throat suctioned as Pt coughed to removed all secretions. BBS , no stridor noted. Pt able to verbalize name and cough effectively. Pt tolerated procedure well, no acute distress noted. Pt placed on 40L 40% FiO2 via airvo, O2 sat 95%. Will monitor respiratory status.

## 2018-03-07 NOTE — PROGRESS NOTES
Dual skin assessment performed with Jazmín Schwarz RN for continuation of care. Allevyn in place over sacrum. Allevyn removed and sacrum inspected. Allevyn placed back over sacrum. No redness or breakdown observed.

## 2018-03-08 ENCOUNTER — APPOINTMENT (OUTPATIENT)
Dept: GENERAL RADIOLOGY | Age: 58
DRG: 220 | End: 2018-03-08
Attending: THORACIC SURGERY (CARDIOTHORACIC VASCULAR SURGERY)
Payer: COMMERCIAL

## 2018-03-08 PROBLEM — D62 POSTOPERATIVE ANEMIA DUE TO ACUTE BLOOD LOSS: Status: ACTIVE | Noted: 2018-03-08

## 2018-03-08 PROBLEM — D69.59 THROMBOCYTOPENIA DUE TO EXTRA CORPOREAL BY-PASS CIRCULATION: Status: ACTIVE | Noted: 2018-03-08

## 2018-03-08 PROBLEM — I65.21 ASYMPTOMATIC CAROTID ARTERY STENOSIS, RIGHT: Chronic | Status: ACTIVE | Noted: 2018-03-08

## 2018-03-08 PROBLEM — I21.09 MYOCARDIAL INFARCTION OF ANTERIOR WALL (HCC): Status: ACTIVE | Noted: 2018-03-08

## 2018-03-08 LAB
ANION GAP SERPL CALC-SCNC: 6 MMOL/L (ref 7–16)
ATRIAL RATE: 89 BPM
BUN SERPL-MCNC: 12 MG/DL (ref 6–23)
CALCIUM SERPL-MCNC: 7.1 MG/DL (ref 8.3–10.4)
CALCULATED P AXIS, ECG09: 38 DEGREES
CALCULATED R AXIS, ECG10: 55 DEGREES
CALCULATED T AXIS, ECG11: -14 DEGREES
CHLORIDE SERPL-SCNC: 116 MMOL/L (ref 98–107)
CO2 SERPL-SCNC: 24 MMOL/L (ref 21–32)
CREAT SERPL-MCNC: 0.78 MG/DL (ref 0.8–1.5)
DIAGNOSIS, 93000: NORMAL
ERYTHROCYTE [DISTWIDTH] IN BLOOD BY AUTOMATED COUNT: 13.7 % (ref 11.9–14.6)
GLUCOSE BLD STRIP.AUTO-MCNC: 100 MG/DL (ref 65–100)
GLUCOSE BLD STRIP.AUTO-MCNC: 106 MG/DL (ref 65–100)
GLUCOSE BLD STRIP.AUTO-MCNC: 112 MG/DL (ref 65–100)
GLUCOSE BLD STRIP.AUTO-MCNC: 112 MG/DL (ref 65–100)
GLUCOSE BLD STRIP.AUTO-MCNC: 118 MG/DL (ref 65–100)
GLUCOSE BLD STRIP.AUTO-MCNC: 128 MG/DL (ref 65–100)
GLUCOSE BLD STRIP.AUTO-MCNC: 128 MG/DL (ref 65–100)
GLUCOSE BLD STRIP.AUTO-MCNC: 156 MG/DL (ref 65–100)
GLUCOSE SERPL-MCNC: 104 MG/DL (ref 65–100)
HCT VFR BLD AUTO: 26 % (ref 41.1–50.3)
HCT VFR BLD AUTO: 26.5 % (ref 41.1–50.3)
HGB BLD-MCNC: 8.4 G/DL (ref 13.6–17.2)
HGB BLD-MCNC: 8.7 G/DL (ref 13.6–17.2)
MAGNESIUM SERPL-MCNC: 2.6 MG/DL (ref 1.8–2.4)
MCH RBC QN AUTO: 29 PG (ref 26.1–32.9)
MCHC RBC AUTO-ENTMCNC: 32.3 G/DL (ref 31.4–35)
MCV RBC AUTO: 89.7 FL (ref 79.6–97.8)
MM INDURATION POC: 0 MM (ref 0–5)
P-R INTERVAL, ECG05: 148 MS
PLATELET # BLD AUTO: 130 K/UL (ref 150–450)
PMV BLD AUTO: 10.2 FL (ref 10.8–14.1)
POTASSIUM SERPL-SCNC: 4.4 MMOL/L (ref 3.5–5.1)
PPD POC: NORMAL NEGATIVE
Q-T INTERVAL, ECG07: 374 MS
QRS DURATION, ECG06: 134 MS
QTC CALCULATION (BEZET), ECG08: 455 MS
RBC # BLD AUTO: 2.9 M/UL (ref 4.23–5.67)
SODIUM SERPL-SCNC: 146 MMOL/L (ref 136–145)
VENTRICULAR RATE, ECG03: 89 BPM
WBC # BLD AUTO: 8.5 K/UL (ref 4.3–11.1)

## 2018-03-08 PROCEDURE — 74011250636 HC RX REV CODE- 250/636: Performed by: THORACIC SURGERY (CARDIOTHORACIC VASCULAR SURGERY)

## 2018-03-08 PROCEDURE — 93005 ELECTROCARDIOGRAM TRACING: CPT | Performed by: THORACIC SURGERY (CARDIOTHORACIC VASCULAR SURGERY)

## 2018-03-08 PROCEDURE — 36600 WITHDRAWAL OF ARTERIAL BLOOD: CPT

## 2018-03-08 PROCEDURE — 82962 GLUCOSE BLOOD TEST: CPT

## 2018-03-08 PROCEDURE — 77010033678 HC OXYGEN DAILY

## 2018-03-08 PROCEDURE — 65660000004 HC RM CVT STEPDOWN

## 2018-03-08 PROCEDURE — 71045 X-RAY EXAM CHEST 1 VIEW: CPT

## 2018-03-08 PROCEDURE — 80048 BASIC METABOLIC PNL TOTAL CA: CPT | Performed by: THORACIC SURGERY (CARDIOTHORACIC VASCULAR SURGERY)

## 2018-03-08 PROCEDURE — 74011250637 HC RX REV CODE- 250/637: Performed by: NURSE PRACTITIONER

## 2018-03-08 PROCEDURE — 83735 ASSAY OF MAGNESIUM: CPT | Performed by: THORACIC SURGERY (CARDIOTHORACIC VASCULAR SURGERY)

## 2018-03-08 PROCEDURE — 74011250637 HC RX REV CODE- 250/637: Performed by: THORACIC SURGERY (CARDIOTHORACIC VASCULAR SURGERY)

## 2018-03-08 PROCEDURE — 99232 SBSQ HOSP IP/OBS MODERATE 35: CPT | Performed by: INTERNAL MEDICINE

## 2018-03-08 PROCEDURE — 85027 COMPLETE CBC AUTOMATED: CPT | Performed by: THORACIC SURGERY (CARDIOTHORACIC VASCULAR SURGERY)

## 2018-03-08 RX ORDER — MAG HYDROX/ALUMINUM HYD/SIMETH 200-200-20
30 SUSPENSION, ORAL (FINAL DOSE FORM) ORAL
Status: DISCONTINUED | OUTPATIENT
Start: 2018-03-08 | End: 2018-03-11 | Stop reason: HOSPADM

## 2018-03-08 RX ORDER — FUROSEMIDE 10 MG/ML
20 INJECTION INTRAMUSCULAR; INTRAVENOUS ONCE
Status: DISCONTINUED | OUTPATIENT
Start: 2018-03-08 | End: 2018-03-08

## 2018-03-08 RX ORDER — ADHESIVE BANDAGE
30 BANDAGE TOPICAL DAILY PRN
Status: DISCONTINUED | OUTPATIENT
Start: 2018-03-08 | End: 2018-03-11 | Stop reason: HOSPADM

## 2018-03-08 RX ORDER — ASPIRIN 81 MG/1
81 TABLET ORAL DAILY
Status: DISCONTINUED | OUTPATIENT
Start: 2018-03-09 | End: 2018-03-11 | Stop reason: HOSPADM

## 2018-03-08 RX ORDER — FUROSEMIDE 40 MG/1
40 TABLET ORAL DAILY
Status: DISCONTINUED | OUTPATIENT
Start: 2018-03-08 | End: 2018-03-08

## 2018-03-08 RX ORDER — AMIODARONE HYDROCHLORIDE 200 MG/1
200 TABLET ORAL EVERY 12 HOURS
Status: DISCONTINUED | OUTPATIENT
Start: 2018-03-08 | End: 2018-03-11 | Stop reason: HOSPADM

## 2018-03-08 RX ORDER — CARVEDILOL 12.5 MG/1
12.5 TABLET ORAL 2 TIMES DAILY WITH MEALS
Status: DISCONTINUED | OUTPATIENT
Start: 2018-03-08 | End: 2018-03-09

## 2018-03-08 RX ORDER — ACETAMINOPHEN 325 MG/1
650 TABLET ORAL
Status: DISCONTINUED | OUTPATIENT
Start: 2018-03-08 | End: 2018-03-11 | Stop reason: HOSPADM

## 2018-03-08 RX ORDER — TRAMADOL HYDROCHLORIDE 50 MG/1
50 TABLET ORAL
Status: DISCONTINUED | OUTPATIENT
Start: 2018-03-08 | End: 2018-03-11 | Stop reason: HOSPADM

## 2018-03-08 RX ORDER — POTASSIUM CHLORIDE 750 MG/1
10 TABLET, EXTENDED RELEASE ORAL DAILY
Status: COMPLETED | OUTPATIENT
Start: 2018-03-09 | End: 2018-03-11

## 2018-03-08 RX ORDER — ONDANSETRON 2 MG/ML
4 INJECTION INTRAMUSCULAR; INTRAVENOUS
Status: DISCONTINUED | OUTPATIENT
Start: 2018-03-08 | End: 2018-03-11 | Stop reason: HOSPADM

## 2018-03-08 RX ORDER — LANOLIN ALCOHOL/MO/W.PET/CERES
400 CREAM (GRAM) TOPICAL
Status: DISCONTINUED | OUTPATIENT
Start: 2018-03-08 | End: 2018-03-11 | Stop reason: HOSPADM

## 2018-03-08 RX ORDER — MUPIROCIN 20 MG/G
OINTMENT TOPICAL EVERY 12 HOURS
Status: DISCONTINUED | OUTPATIENT
Start: 2018-03-08 | End: 2018-03-08

## 2018-03-08 RX ORDER — POTASSIUM CHLORIDE 750 MG/1
10 TABLET, EXTENDED RELEASE ORAL DAILY
Status: DISCONTINUED | OUTPATIENT
Start: 2018-03-08 | End: 2018-03-08

## 2018-03-08 RX ORDER — AMIODARONE HYDROCHLORIDE 200 MG/1
200 TABLET ORAL 2 TIMES DAILY
Status: DISCONTINUED | OUTPATIENT
Start: 2018-03-08 | End: 2018-03-08

## 2018-03-08 RX ORDER — INSULIN LISPRO 100 [IU]/ML
INJECTION, SOLUTION INTRAVENOUS; SUBCUTANEOUS
Status: DISCONTINUED | OUTPATIENT
Start: 2018-03-08 | End: 2018-03-09

## 2018-03-08 RX ORDER — FAMOTIDINE 20 MG/1
20 TABLET, FILM COATED ORAL EVERY 12 HOURS
Status: DISCONTINUED | OUTPATIENT
Start: 2018-03-08 | End: 2018-03-08

## 2018-03-08 RX ORDER — FAMOTIDINE 20 MG/1
20 TABLET, FILM COATED ORAL 2 TIMES DAILY
Status: DISCONTINUED | OUTPATIENT
Start: 2018-03-08 | End: 2018-03-08

## 2018-03-08 RX ORDER — MUPIROCIN 20 MG/G
OINTMENT TOPICAL 2 TIMES DAILY
Status: DISCONTINUED | OUTPATIENT
Start: 2018-03-08 | End: 2018-03-08

## 2018-03-08 RX ORDER — POTASSIUM CHLORIDE 20 MEQ/1
20 TABLET, EXTENDED RELEASE ORAL
Status: DISCONTINUED | OUTPATIENT
Start: 2018-03-08 | End: 2018-03-11 | Stop reason: HOSPADM

## 2018-03-08 RX ORDER — SODIUM CHLORIDE 0.9 % (FLUSH) 0.9 %
5-10 SYRINGE (ML) INJECTION EVERY 8 HOURS
Status: DISCONTINUED | OUTPATIENT
Start: 2018-03-08 | End: 2018-03-11 | Stop reason: HOSPADM

## 2018-03-08 RX ORDER — CARVEDILOL 3.12 MG/1
3.12 TABLET ORAL 2 TIMES DAILY WITH MEALS
Status: DISCONTINUED | OUTPATIENT
Start: 2018-03-08 | End: 2018-03-08

## 2018-03-08 RX ORDER — POTASSIUM CHLORIDE 20 MEQ/1
40 TABLET, EXTENDED RELEASE ORAL
Status: DISCONTINUED | OUTPATIENT
Start: 2018-03-08 | End: 2018-03-11 | Stop reason: HOSPADM

## 2018-03-08 RX ORDER — AMOXICILLIN 250 MG
2 CAPSULE ORAL
Status: DISCONTINUED | OUTPATIENT
Start: 2018-03-08 | End: 2018-03-11 | Stop reason: HOSPADM

## 2018-03-08 RX ORDER — ZOLPIDEM TARTRATE 5 MG/1
5 TABLET ORAL
Status: DISCONTINUED | OUTPATIENT
Start: 2018-03-08 | End: 2018-03-11 | Stop reason: HOSPADM

## 2018-03-08 RX ORDER — FAMOTIDINE 20 MG/1
20 TABLET, FILM COATED ORAL EVERY 12 HOURS
Status: DISCONTINUED | OUTPATIENT
Start: 2018-03-08 | End: 2018-03-11 | Stop reason: HOSPADM

## 2018-03-08 RX ORDER — MUPIROCIN 20 MG/G
OINTMENT TOPICAL EVERY 12 HOURS
Status: DISCONTINUED | OUTPATIENT
Start: 2018-03-08 | End: 2018-03-11 | Stop reason: HOSPADM

## 2018-03-08 RX ORDER — SODIUM CHLORIDE 0.9 % (FLUSH) 0.9 %
5-10 SYRINGE (ML) INJECTION AS NEEDED
Status: DISCONTINUED | OUTPATIENT
Start: 2018-03-08 | End: 2018-03-11 | Stop reason: HOSPADM

## 2018-03-08 RX ORDER — CARVEDILOL 12.5 MG/1
12.5 TABLET ORAL 2 TIMES DAILY WITH MEALS
Status: DISCONTINUED | OUTPATIENT
Start: 2018-03-08 | End: 2018-03-08

## 2018-03-08 RX ORDER — FUROSEMIDE 40 MG/1
40 TABLET ORAL DAILY
Status: COMPLETED | OUTPATIENT
Start: 2018-03-09 | End: 2018-03-11

## 2018-03-08 RX ADMIN — FAMOTIDINE 20 MG: 20 TABLET, FILM COATED ORAL at 07:55

## 2018-03-08 RX ADMIN — CARVEDILOL 12.5 MG: 12.5 TABLET, FILM COATED ORAL at 16:05

## 2018-03-08 RX ADMIN — OXYCODONE HYDROCHLORIDE AND ACETAMINOPHEN 1 TABLET: 10; 325 TABLET ORAL at 20:48

## 2018-03-08 RX ADMIN — Medication 2 G: at 03:40

## 2018-03-08 RX ADMIN — OXYCODONE HYDROCHLORIDE AND ACETAMINOPHEN 1 TABLET: 10; 325 TABLET ORAL at 07:51

## 2018-03-08 RX ADMIN — OXYCODONE HYDROCHLORIDE AND ACETAMINOPHEN 1 TABLET: 10; 325 TABLET ORAL at 16:05

## 2018-03-08 RX ADMIN — Medication 10 ML: at 06:11

## 2018-03-08 RX ADMIN — OXYCODONE HYDROCHLORIDE AND ACETAMINOPHEN 1 TABLET: 5; 325 TABLET ORAL at 03:40

## 2018-03-08 RX ADMIN — Medication 10 ML: at 20:48

## 2018-03-08 RX ADMIN — MUPIROCIN: 20 OINTMENT TOPICAL at 20:48

## 2018-03-08 RX ADMIN — OXYCODONE HYDROCHLORIDE AND ACETAMINOPHEN 1 TABLET: 10; 325 TABLET ORAL at 12:11

## 2018-03-08 RX ADMIN — MUPIROCIN: 20 OINTMENT TOPICAL at 07:56

## 2018-03-08 RX ADMIN — Medication 10 ML: at 16:08

## 2018-03-08 RX ADMIN — ATORVASTATIN CALCIUM 80 MG: 40 TABLET, FILM COATED ORAL at 20:47

## 2018-03-08 RX ADMIN — STANDARDIZED SENNA CONCENTRATE AND DOCUSATE SODIUM 2 TABLET: 8.6; 5 TABLET, FILM COATED ORAL at 20:48

## 2018-03-08 RX ADMIN — AMIODARONE HYDROCHLORIDE 200 MG: 200 TABLET ORAL at 20:47

## 2018-03-08 RX ADMIN — AMIODARONE HYDROCHLORIDE 200 MG: 200 TABLET ORAL at 07:56

## 2018-03-08 RX ADMIN — FAMOTIDINE 20 MG: 20 TABLET, FILM COATED ORAL at 20:48

## 2018-03-08 RX ADMIN — ASPIRIN 81 MG 81 MG: 81 TABLET ORAL at 07:55

## 2018-03-08 NOTE — CONSULTS
[address] [facility]  [phone]     History and Physical / Surgical Consultation   Beth Skinner    Admit date: 3/7/2018    MRN: [id number]     : 1960     Age: 59 y.o.          3/8/2018 12:18 PM    Subjective/HPI:   This patient is a pleasant 59 y.o. male seen at the request of Tamra Eric MD and evaluated for high-grade right internal carotid stenosis. The patient was seen in presence of his wife but provides his own history adequately. PMH with HTN, CAD now s/p 3v CABG and AVR yesterday, previous AMI and 40pk-yr cigarette smoking history (quit 2018), he was found to have 70-99% diameter stenosis of the proximal internal carotid artery on pre-CABG carotid duplex. We were consulted. Today he has incisional chest pain and left thigh pain but otherwise has no complaints. He denies previous CVA or TIA or family history of either. He denies previous slurred speech, facial drooping, amaurosis fugax or lateralized weakness. Patient's past medical, surgical, family and social histories were reviewed as noted here and below. Review of Systems  A comprehensive review of systems was negative except for that written in the HPI.     Past Medical History:   Diagnosis Date    Acute diastolic CHF (congestive heart failure) ([address] 2018    Adverse effect of anesthesia  in [address] [place] [address] Drive    with ankle surg--- pt states woke up during surg and had to have a E.T.  PLACED DUE TO HE \"WAS MOVING ABOUT\"    Asymptomatic carotid artery stenosis, right 3/8/2018    Claudication of left lower extremity ([address] 2018    Coronary artery disease involving native coronary artery of native heart 2018    mi 18-- stents x 2 placed--- -- followed by dr Vicky North Dyslipidemia 2018    Essential hypertension 2018    controlled with med    Family history of premature CAD 2018    Ill-defined condition     wearing life vest---  Snores     per wife-- none since stent placed     STEMI involving left anterior descending coronary artery ([address] 1/18/2018    Tobacco abuse 1/18/2018      Past Surgical History:   Procedure Laterality Date    CARDIAC SURG PROCEDURE UNLIST  01/18/2018    cath with stents    HX ORTHOPAEDIC Right 01/2003    ankle surg with pinning      Allergies   Allergen Reactions    Adhesive Rash     Patient states skin gets irritated when adhesive is left on him for a period of time. Social History   Substance Use Topics    Smoking status: Former Smoker     Packs/day: 1.00     Years: 40.00     Quit date: 1/18/2018    Smokeless tobacco: Never Used    Alcohol use Yes      Comment: OCC      Social History     Social History Narrative     Family History   Problem Relation Age of Onset    Coronary Artery Disease Mother      CABG Age 72    Heart Disease Mother     Coronary Artery Disease Brother      CAD/PTCA stent age 46s    Cancer Father     Heart Disease Maternal Uncle     Heart Disease Paternal Grandmother     Heart Disease Paternal Grandfather       Prior to Admission Medications   Prescriptions Last Dose Informant Patient Reported? Taking?   amiodarone (CORDARONE) 200 mg tablet 3/6/2018 at 2100  Yes Yes   Sig: Take 600 mg by mouth once. Pt to take amiodarone 600 mg as one time dose after 4 pm day before surg   aspirin 81 mg chewable tablet 3/7/2018 at 0430  Yes Yes   Sig: Take 325 mg by mouth daily. atorvastatin (LIPITOR) 80 mg tablet 3/6/2018 at Unknown time  No Yes   Sig: Take 1 Tab by mouth nightly. Patient taking differently: Take 80 mg by mouth daily. carvedilol (COREG) 12.5 mg tablet 3/7/2018 at 0430  No Yes   Sig: Take 1 Tab by mouth two (2) times daily (after meals). lisinopril (PRINIVIL, ZESTRIL) 10 mg tablet 3/6/2018 at Unknown time  No Yes   Sig: Take 1 Tab by mouth daily. mupirocin calcium (BACTROBAN) 2 % nasal ointment 3/7/2018 at 0430  Yes Yes   Sig: by Both Nostrils route once.  Pt to use mupirocin nasal oint evening prior to surg and am of surg-- surg planned 3/7/18   nitroglycerin (NITROSTAT) 0.4 mg SL tablet   No No   Si Tab by SubLINGual route every five (5) minutes as needed for Chest Pain. ticagrelor (BRILINTA) 90 mg tablet 2018  No No   Sig: Take 1 Tab by mouth every twelve (12) hours every twelve (12) hours. Patient taking differently: Take 90 mg by mouth every twelve (12) hours every twelve (12) hours.  STOPPED 18---- PER DR ALCOCER      Facility-Administered Medications: None     Current Facility-Administered Medications   Medication Dose Route Frequency    sodium chloride (NS) flush 5-10 mL  5-10 mL IntraVENous Q8H    sodium chloride (NS) flush 5-10 mL  5-10 mL IntraVENous PRN    carvedilol (COREG) tablet 3.125 mg  3.125 mg Oral BID WITH MEALS    magnesium hydroxide (MILK OF MAGNESIA) 400 mg/5 mL oral suspension 30 mL  30 mL Oral DAILY PRN    alum-mag hydroxide-simeth (MYLANTA) oral suspension 30 mL  30 mL Oral Q4H PRN    ondansetron (ZOFRAN) injection 4 mg  4 mg IntraVENous Q6H PRN    acetaminophen (TYLENOL) tablet 650 mg  650 mg Oral Q4H PRN    zolpidem (AMBIEN) tablet 5 mg  5 mg Oral QHS PRN    [START ON 3/9/2018] aspirin delayed-release tablet 81 mg  81 mg Oral DAILY    magnesium oxide (MAG-OX) tablet 400 mg  400 mg Oral TID PRN    magnesium oxide (MAG-OX) tablet 400 mg  400 mg Oral QID PRN    potassium chloride (K-DUR, KLOR-CON) SR tablet 20 mEq  20 mEq Oral BID PRN    potassium chloride (K-DUR, KLOR-CON) SR tablet 40 mEq  40 mEq Oral BID PRN    traMADol (ULTRAM) tablet 50 mg  50 mg Oral Q6H PRN    amiodarone (CORDARONE) tablet 200 mg  200 mg Oral Q12H    famotidine (PEPCID) tablet 20 mg  20 mg Oral Q12H    [START ON 3/9/2018] furosemide (LASIX) tablet 40 mg  40 mg Oral DAILY    mupirocin (BACTROBAN) 2 % ointment   Both Nostrils Q12H    [START ON 3/9/2018] potassium chloride (KLOR-CON) tablet 10 mEq  10 mEq Oral DAILY    senna-docusate (PERICOLACE) 8.6-50 mg per tablet 2 Tab  2 Tab Oral QHS    insulin lispro (HUMALOG) injection   SubCUTAneous AC&HS    atorvastatin (LIPITOR) tablet 80 mg  80 mg Oral QHS    tuberculin injection 5 Units  5 Units IntraDERMal ONCE    oxyCODONE-acetaminophen (PERCOCET 10)  mg per tablet 1 Tab  1 Tab Oral Q4H PRN     Facility-Administered Medications Ordered in Other Encounters   Medication Dose Route Frequency    0.9% sodium chloride infusion 250 mL  250 mL IntraVENous PRN    0.9% sodium chloride infusion 250 mL  250 mL IntraVENous PRN     Objective:     Vitals:    03/08/18 0849 03/08/18 0903 03/08/18 0918 03/08/18 0920   BP: 112/56 115/58 104/53 120/58   Pulse: 98 97 95 (!) 105   Resp: 23 26 19 18   Temp:    99.2 °F (37.3 °C)   SpO2: 95% 93% (!) 89% 94%   Weight:       Height:         03/08 0701 - 03/08 1900  In: 50.9 [I.V.:50.9]  Out: 98 [Urine:98]  03/06 1901 - 03/08 0700  In: 3946.8 [P.O.:240;  I.V.:3236.8]  Out: 9842 [Urine:3162]    Physical Exam:   General well-developed, overweight, in no acute distress  Skin  warm and moist with good texture and good turgor; no jaundice or rashes  HEENT normocephalic without lesions to the scalp or skull; facial symmetry, tongue midline; extraocular muscles intact without nystagmus; mouth with adequate dentition, oral mucosa moist without lesions  Neck  supple without JVD or bruits; trachea midline  Chest  fresh median sternotomy incision; respirations unlabored, lungs clear to auscultation bilaterally  Heart  regular rate and rhythm, no appreciable murmurs  Abdomen soft, protuberant but non-distended, non-tender; bowel sounds normoactive  Extremities warm without cyanosis; compression stockings in place, left thigh in ACE wrap, scant pedal edema  Pulses  2+ and symmetric at radial, brachial and posterior tibialis  Neurological alert and oriented; no gross sensorimotor deficits     Data Review   Recent Labs      03/08/18   0306  03/07/18   2320  03/07/18   1924 03/07/18   1529   WBC  8.5   --    --   13.6*   HGB  8.4*  8.7*  9.6*  10.6*   HCT  26.0*  26.5*  29.0*  32.2*   PLT  130*   --    --   163     Recent Labs      03/08/18   0306  03/07/18   2320  03/07/18   1924  03/07/18   1529   NA  146*   --    --   148*   K  4.4  4.4  4.8  4.5   CL  116*   --    --   117*   CO2  24   --    --   25   GLU  104*   --    --   94   BUN  12   --    --   14   CREA  0.78*   --    --   0.77*   MG  2.6*  2.6*  2.5*  3.4*   INR   --    --   1.6  1.7       Imaging  Carotid ultrasound: 3/5/2018   INDICATION: Preoperative CABG   COMPARISON: None    FINDINGS: Nascet criteria were used for evaluation.     The right common carotid artery systolic velocity is 58,  and ECA 89. The  ICA to CCA systolic velocity ratio is 10.1 . The flow within the vertebral  artery ist antegrade antegrade aranza ismoderate plaque at the carotid bulb.     The left common carotid artery systolic velocity is 604,  and . The ICA to CCA systolic velocity ratio is 0.9. The flow within the vertebral  artery is antegrade. There is moderate plaque at the carotid bulb.     IMPRESSION:  1. Right carotid artery: 70-99% diameter stenosis of the proximal internal  carotid artery. 2. Left carotid artery: Less than 50% diameter stenosis of the proximal internal  carotid artery.       Assessment / Plan:     Hospital Problems  Date Reviewed: 3/7/2018          Codes Class Noted POA    Postoperative anemia due to acute blood loss ICD-10-CM: D62  ICD-9-CM: 285.1  3/8/2018 No        Myocardial infarction of anterior wall (HCC) ICD-10-CM: I21.09  ICD-9-CM: 410.10  3/8/2018 Yes        Thrombocytopenia due to extra corporeal by-pass circulation ICD-10-CM: D69.59  ICD-9-CM: 287.49  3/8/2018 No        Asymptomatic carotid artery stenosis, right (Chronic) ICD-10-CM: X43.54  ICD-9-CM: 433.10  3/8/2018 Yes        * (Principal)Status post coronary artery bypass graft ICD-10-CM: Z95.1  ICD-9-CM: V45.81  3/7/2018 Yes Encounter for weaning from ventilator [facility]) ICD-10-CM: Z99.11  ICD-9-CM: V46.13  3/7/2018 Yes        Hypoxemia ICD-10-CM: R09.02  ICD-9-CM: 799.02  3/7/2018 Yes        S/P AVR (aortic valve replacement) ICD-10-CM: Z95.2  ICD-9-CM: V43.3  3/7/2018 Yes        Nonrheumatic aortic valve stenosis ICD-10-CM: I35.0  ICD-9-CM: 424.1  1/20/2018 Yes        STEMI involving left anterior descending coronary artery ([facility] ICD-10-CM: I21.02  ICD-9-CM: 410.10  1/18/2018 Yes    Overview Signed 3/8/2018  9:06 AM by Karina Valladares NP     anterior STEMI 1/19/18 (less than 7 weeks prior to admission)             Coronary artery disease involving native coronary artery of native heart (Chronic) ICD-10-CM: I25.10  ICD-9-CM: 414.01  1/18/2018 Yes    Overview Addendum 3/8/2018  8:53 AM by Karina Valladares NP     3/7/18 (Dr Luba Ibarra) CORONARY ARTERY BYPASS GRAFT CABG X 3. LIMA to the LAD, SVG to the L PDA and SVG to the ramus,  AVR using 23 mm Davidson bovine pericardial valve  VEIN HARVEST, GREATER SAPHENOUS             Essential hypertension (Chronic) ICD-10-CM: I10  ICD-9-CM: 401.9  1/18/2018 Yes        Tobacco abuse (Chronic) ICD-10-CM: Z72.0  ICD-9-CM: 305.1  1/18/2018 Yes              Jonel Torrez is a 59 y.o. male with high-grade right proximal internal carotid artery stenosis. - outpatient CTA head and neck  - see vascular surgeon on call, Chalino Marquez MD in office in 2 weeks    Thank you very much for this referral.  We appreciate the opportunity to participate in this patient's care. Will follow along with above stated plan. Maryjane Chawla PA-C  Physician Assistant with Vascular Surgery Mikki Brewer MD / Alexa Severino.  Madelin Pat MD / Elle Saenz MD

## 2018-03-08 NOTE — PROGRESS NOTES
Pt's left radial art line removed at this time. Manual pressure held until hemostasis achieved. No bleeding or hematoma at site. Pressure dressing to site. Will monitor. Pt's [place] removed at this time. Pt given instructions for [place] pull and Pt v/u. [place] removed without difficulty, no ectopy seen on monitor. Line hub capped. Pt tolerated both procedures well. No acute distress noted. VSS throughout. Pt placed on NIBP Q 15min. Will monitor.

## 2018-03-08 NOTE — PROGRESS NOTES
TRANSFER - OUT REPORT:    Verbal report given to ARNOL Farias on Dagoberto Lee  being transferred to [facility] for routine progression of care       Report consisted of patients Situation, Background, Assessment and   Recommendations(SBAR). Information from the following report(s) SBAR, Kardex, OR Summary, Procedure Summary, Intake/Output, MAR, Accordion, Recent Results, Med Rec Status and Cardiac Rhythm NSR was reviewed with the receiving nurse. Lines:   Peripheral IV 03/07/18 Right Hand (Active)   Site Assessment Clean, dry, & intact 3/8/2018  7:00 AM   Phlebitis Assessment 0 3/8/2018  7:00 AM   Infiltration Assessment 0 3/8/2018  7:00 AM   Dressing Status Clean, dry, & intact 3/8/2018  7:00 AM   Dressing Type Transparent 3/8/2018  7:00 AM   Hub Color/Line Status Green;Capped 3/8/2018  7:00 AM   Action Taken Open ports on tubing capped 3/7/2018  2:57 PM        Opportunity for questions and clarification was provided.       Patient transported with:   Monitor

## 2018-03-08 NOTE — PROGRESS NOTES
TRANSFER - IN REPORT:    Verbal report received from [address] [place] [address] [address] on Ambrocio Rosiclare  being received from CVICU for routine progression of care      Report consisted of patients Situation, Background, Assessment and   Recommendations(SBAR). Information from the following report(s) SBAR, Kardex, OR Summary, Intake/Output, MAR and Recent Results was reviewed with the receiving nurse. Opportunity for questions and clarification was provided. Assessment completed upon patients arrival to unit and care assumed. Dual skin assessment completed with RN. No redness or skin breakdown on sacrum/coccyx area noted. Allevyn intact.

## 2018-03-08 NOTE — PROGRESS NOTES
CV Progress Note    Admit Date: 3/7/2018    Postop: CABG x 3 /AVR    Subjective:     Patient present conditions:     Review of Systems   Cardiac: regular rhythm  Respiratory: room air, using IS  Neuro: moving all extremities   Incision: dressings Dry and healing  GI: tolerating po   Chest Tubes: out    Objective:     Vitals:  Blood pressure 98/53, pulse 88, temperature 98.3 °F (36.8 °C), resp. rate 20, height 5' 10\" (1.778 m), weight 219 lb 2.2 oz (99.4 kg), SpO2 90 %. Vitals:    03/08/18 0756 03/08/18 0802 03/08/18 0822 03/08/18 0833   BP: 93/50 102/55 114/55 98/53   Pulse:  94 93 88   Resp:  17 27 20   Temp:       SpO2:  94% 95% 90%   Weight:       Height:            I/O:  03/08 0701 - 03/08 1900  In: 50.9 [I.V.:50.9]  Out: 98 [Urine:98]  03/06 1901 - 03/08 0700  In: 3946.8 [P.O.:240;  I.V.:3236.8]  Out: 3168 [Urine:3162]  Last 3 Recorded Weights in this Encounter    03/07/18 0550 03/08/18 0606   Weight: 210 lb 6 oz (95.4 kg) 219 lb 2.2 oz (99.4 kg)       Intake/Output Summary (Last 24 hours) at 03/08/18 0917  Last data filed at 03/08/18 0845   Gross per 24 hour   Intake           3997.6 ml   Output             4002 ml   Net             -4.4 ml           Heart: RRR, slightly tachy  Lung: stable on RA, using IS  Neuro: grossly intact, conversant  Incisions: bulky surgical dressings CDI  Chest Tubes: out  TPW; intact    ECG/Telemetry:     Lab/Data Review:  Recent Results (from the past 12 hour(s))   GLUCOSE, POC    Collection Time: 03/07/18 10:00 PM   Result Value Ref Range    Glucose (POC) 123 (H) 65 - 100 mg/dL   GLUCOSE, POC    Collection Time: 03/07/18 11:19 PM   Result Value Ref Range    Glucose (POC) 118 (H) 65 - 100 mg/dL   POTASSIUM    Collection Time: 03/07/18 11:20 PM   Result Value Ref Range    Potassium 4.4 3.5 - 5.1 mmol/L   HGB & HCT    Collection Time: 03/07/18 11:20 PM   Result Value Ref Range    HGB 8.7 (L) 13.6 - 17.2 g/dL    HCT 26.5 (L) 41.1 - 50.3 %   MAGNESIUM    Collection Time: 03/07/18 11:20 PM   Result Value Ref Range    Magnesium 2.6 (H) 1.8 - 2.4 mg/dL   GLUCOSE, POC    Collection Time: 03/07/18 11:52 PM   Result Value Ref Range    Glucose (POC) 118 (H) 65 - 100 mg/dL   GLUCOSE, POC    Collection Time: 03/08/18  2:09 AM   Result Value Ref Range    Glucose (POC) 112 (H) 65 - 100 mg/dL   CBC W/O DIFF    Collection Time: 03/08/18  3:06 AM   Result Value Ref Range    WBC 8.5 4.3 - 11.1 K/uL    RBC 2.90 (L) 4.23 - 5.67 M/uL    HGB 8.4 (L) 13.6 - 17.2 g/dL    HCT 26.0 (L) 41.1 - 50.3 %    MCV 89.7 79.6 - 97.8 FL    MCH 29.0 26.1 - 32.9 PG    MCHC 32.3 31.4 - 35.0 g/dL    RDW 13.7 11.9 - 14.6 %    PLATELET 694 (L) 855 - 450 K/uL    MPV 10.2 (L) 10.8 - 77.6 FL   METABOLIC PANEL, BASIC    Collection Time: 03/08/18  3:06 AM   Result Value Ref Range    Sodium 146 (H) 136 - 145 mmol/L    Potassium 4.4 3.5 - 5.1 mmol/L    Chloride 116 (H) 98 - 107 mmol/L    CO2 24 21 - 32 mmol/L    Anion gap 6 (L) 7 - 16 mmol/L    Glucose 104 (H) 65 - 100 mg/dL    BUN 12 6 - 23 MG/DL    Creatinine 0.78 (L) 0.8 - 1.5 MG/DL    GFR est AA >60 >60 ml/min/1.73m2    GFR est non-AA >60 >60 ml/min/1.73m2    Calcium 7.1 (L) 8.3 - 10.4 MG/DL   MAGNESIUM    Collection Time: 03/08/18  3:06 AM   Result Value Ref Range    Magnesium 2.6 (H) 1.8 - 2.4 mg/dL   GLUCOSE, POC    Collection Time: 03/08/18  4:24 AM   Result Value Ref Range    Glucose (POC) 112 (H) 65 - 100 mg/dL   GLUCOSE, POC    Collection Time: 03/08/18  6:01 AM   Result Value Ref Range    Glucose (POC) 106 (H) 65 - 100 mg/dL   EKG, 12 LEAD, SUBSEQUENT    Collection Time: 03/08/18  6:55 AM   Result Value Ref Range    Ventricular Rate 89 BPM    Atrial Rate 89 BPM    P-R Interval 148 ms    QRS Duration 134 ms    Q-T Interval 374 ms    QTC Calculation (Bezet) 455 ms    Calculated P Axis 38 degrees    Calculated R Axis 55 degrees    Calculated T Axis -14 degrees    Diagnosis       Normal sinus rhythm  Right bundle branch block  T wave abnormality, consider inferolateral ischemia  Abnormal ECG  When compared with ECG of 07-MAR-2018 16:00,  No significant change was found     GLUCOSE, POC    Collection Time: 03/08/18  7:59 AM   Result Value Ref Range    Glucose (POC) 128 (H) 65 - 100 mg/dL     CMP: Lab Results   Component Value Date/Time     (H) 03/08/2018 03:06 AM    K 4.4 03/08/2018 03:06 AM     (H) 03/08/2018 03:06 AM    CO2 24 03/08/2018 03:06 AM    AGAP 6 (L) 03/08/2018 03:06 AM     (H) 03/08/2018 03:06 AM    BUN 12 03/08/2018 03:06 AM    CREA 0.78 (L) 03/08/2018 03:06 AM    GFRAA >60 03/08/2018 03:06 AM    GFRNA >60 03/08/2018 03:06 AM    CA 7.1 (L) 03/08/2018 03:06 AM    MG 2.6 (H) 03/08/2018 03:06 AM     CBC: Lab Results   Component Value Date/Time    WBC 8.5 03/08/2018 03:06 AM    HGB 8.4 (L) 03/08/2018 03:06 AM    HCT 26.0 (L) 03/08/2018 03:06 AM     (L) 03/08/2018 03:06 AM       Liver Panel: No results found for: ALB, CBIL, TBIL, TP, GLOB, AGRAT, SGOT, ASTPOC, ALTPOC, ALT, GPT, AP     Radiology:     Assessment:     Stable CABG x 3. AVR.        Plan/Recommendations/Medical Decision Making:     Continue present treatment on stepdown  On RA   Watch rate, as BB held this AM due to hypotension      See orders    Latoya Vieyra NP  3/8/2018

## 2018-03-08 NOTE — PROGRESS NOTES
Leny Veloz  Admission Date: 3/7/2018             Daily Progress Note: 3/8/2018     Patient is seen at the request of Dr. Marilynn Snyder for respiratory management status post cardiac surgery. Patient had CABG X 3 and AVR.       He recently underwent left cardiac catheterization yielding severe multivessel CAD after he presented with angina. Today, he was scheduled for revascularization with possible AVR per Dr. Marilynn Snyder. He has no known lung disease but does have a hx of tobacco abuse- 1ppd. His pre-op spirometry was normal.  He does not use home oxygen, nebulizers, or CPAP. Currently, is sedated in CV-ICU and orally intubated receiving mechanical ventilation.  We have been asked to see in the CV-ICU for mechanical ventilation management and weaning  Subjective:     Extubated uneventfully  Up in chair  No complaints    Current Facility-Administered Medications   Medication Dose Route Frequency    famotidine (PEPCID) tablet 20 mg  20 mg Oral Q12H    atorvastatin (LIPITOR) tablet 80 mg  80 mg Oral QHS    0.9% sodium chloride infusion  25 mL/hr IntraVENous CONTINUOUS    dextrose 5% - 0.45% NaCl with KCl 20 mEq/L infusion  25 mL/hr IntraVENous CONTINUOUS    sodium chloride (NS) flush 5-10 mL  5-10 mL IntraVENous Q8H    sodium chloride (NS) flush 5-10 mL  5-10 mL IntraVENous PRN    oxyCODONE-acetaminophen (PERCOCET) 5-325 mg per tablet 1 Tab  1 Tab Oral Q4H PRN    morphine injection 3-5 mg  3-5 mg IntraVENous Q1H PRN    naloxone (NARCAN) injection 0.4 mg  0.4 mg IntraVENous PRN    mupirocin (BACTROBAN) 2 % ointment   Both Nostrils BID    sodium bicarbonate 8.4 % (1 mEq/mL) injection 50 mEq  50 mEq IntraVENous PRN    EPINEPHrine (ADRENALIN) 4 mg in 0.9% sodium chloride 250 mL infusion  0.05-0.1 mcg/kg/min IntraVENous TITRATE    nitroglycerin (Tridil) 200 mcg/ml infusion   mcg/min IntraVENous TITRATE    PHENYLephrine (NORMA-SYNEPHRINE) 30 mg in 0.9% sodium chloride 250 mL infusion   mcg/min IntraVENous TITRATE    lidocaine (PF) (XYLOCAINE) 100 mg/5 mL (2 %) injection syringe  mg   mg IntraVENous ONCE PRN    amiodarone (CORDARONE) tablet 200 mg  200 mg Oral Q12H    ondansetron (ZOFRAN) injection 4 mg  4 mg IntraVENous Q4H PRN    insulin regular (NOVOLIN R, HUMULIN R) 100 Units in 0.9% sodium chloride 100 mL infusion  1 Units/hr IntraVENous TITRATE    dextrose (D50W) injection syrg 12.5 g  25 mL IntraVENous PRN    aspirin chewable tablet 81 mg  81 mg Oral DAILY    magnesium sulfate 1 g/100 ml IVPB (premix or compounded)  1 g IntraVENous PRN    potassium chloride 10 mEq in 50 ml IVPB  10 mEq IntraVENous PRN    midazolam (VERSED) injection 1 mg  1 mg IntraVENous Q1H PRN    propofol (DIPRIVAN) infusion  0-50 mcg/kg/min IntraVENous TITRATE    tuberculin injection 5 Units  5 Units IntraDERMal ONCE    carvedilol (COREG) tablet 3.125 mg  3.125 mg Oral BID WITH MEALS    DOBUTamine (DOBUTREX) 500 mg/250 mL (2,000 mcg/mL) infusion  2-7 mcg/kg/min IntraVENous TITRATE    albumin human 5% (BUMINATE) solution 25 g  25 g IntraVENous PRN    oxyCODONE-acetaminophen (PERCOCET 10)  mg per tablet 1 Tab  1 Tab Oral Q4H PRN     Facility-Administered Medications Ordered in Other Encounters   Medication Dose Route Frequency    0.9% sodium chloride infusion 250 mL  250 mL IntraVENous PRN    0.9% sodium chloride infusion 250 mL  250 mL IntraVENous PRN         Objective:     Vitals:    03/08/18 0617 03/08/18 0632 03/08/18 0648 03/08/18 0700   BP: 107/52 106/54 114/57    Pulse: 82 80 84 87   Resp: 20 16 15 16   Temp:    98.3 °F (36.8 °C)   SpO2: 99% 100% 100% 99%   Weight:       Height:         Intake and Output:   03/06 1901 - 03/08 0700  In: 3946.8 [P.O.:240;  I.V.:3236.8]  Out: 6531 [Urine:3162]       Physical Exam:          GEN: well developed and in no acute distress, Oxygen per RA  HEENT:  PERRL, EOMI, no alar flaring or epistaxis, oral mucosa moist without cyanosis,   NECK:  no JVD, no retractions, no thyromegaly or masses,   LUNGS:  CTA  HEART:  RRR with no M,G,R;  ABDOMEN:  soft with no tenderness; positive bowel sounds present  EXTREMITIES:  warm with no cyanosis, trace lower leg edema  SKIN:  no jaundice or ecchymosis   NEURO:  alert and oriented, grossly non-focal    CHEST XRAY:     LAB  Recent Labs      03/08/18 0306 03/07/18 2320 03/07/18 1924 03/07/18   1529  03/05/18   0827   WBC  8.5   --    --   13.6*  7.9   HGB  8.4*  8.7*  9.6*  10.6*  12.9*   HCT  26.0*  26.5*  29.0*  32.2*  38.7*   PLT  130*   --    --   163  280     Recent Labs      03/08/18 0306 03/07/18 2320 03/07/18 1924 03/07/18   1529 03/05/18   0827   NA  146*   --    --   148*   --   141   K  4.4  4.4  4.8  4.5   --   4.6   CL  116*   --    --   117*   --   106   CO2  24   --    --   25   --   28   GLU  104*   --    --   94   --   103*   BUN  12   --    --   14   --   18   CREA  0.78*   --    --   0.77*   --   0.91   MG  2.6*  2.6*  2.5*  3.4*   < >   --    INR   --    --   1.6  1.7   --   1.0    < > = values in this interval not displayed. Recent Labs      03/07/18   1505   PH  7.33*   PCO2  46*   PO2  115*   HCO3  24     No results for input(s): LCAD, LAC in the last 72 hours.   Assessment:     Patient Active Problem List   Diagnosis Code    STEMI involving left anterior descending coronary artery ([facility]) I21.02    Coronary artery disease involving native coronary artery of native heart I25.10    Essential hypertension I10    Dyslipidemia E78.5    Tobacco abuse Z72.0    STEMI (ST elevation myocardial infarction) ([facility] I21.3    Family history of premature CAD Z82.49    Claudication of left lower extremity ([facility] I73.9    Acute diastolic CHF (congestive heart failure) ([facility]) I50.31    Nonrheumatic aortic valve stenosis I35.0    Status post coronary artery bypass graft Z95.1    Encounter for weaning from ventilator ([facility] Z99.11    Hypoxemia R09.02    S/P AVR (aortic valve replacement) Z95.2 Plan     Hospital Problems  Date Reviewed: 3/7/2018          Codes Class Noted POA    * (Principal)Status post coronary artery bypass graft ICD-10-CM: Z95.1  ICD-9-CM: V45.81  3/7/2018 Yes    Recovering well    Encounter for weaning from ventilator [facility]) ICD-10-CM: Z99.11  ICD-9-CM: V46.13  3/7/2018 Yes    Successfully weaned    Hypoxemia ICD-10-CM: R09.02  ICD-9-CM: 799.02  3/7/2018 Yes    Resolved on RA    S/P AVR (aortic valve replacement) ICD-10-CM: Z95.2  ICD-9-CM: V43.3  3/7/2018 Yes    Recovering well    Coronary artery disease involving native coronary artery of native heart (Chronic) ICD-10-CM: I25.10  ICD-9-CM: 414.01  1/18/2018 Yes        Essential hypertension (Chronic) ICD-10-CM: I10  ICD-9-CM: 401.9  1/18/2018 Yes        Tobacco abuse (Chronic) ICD-10-CM: Z72.0  ICD-9-CM: 305.1  1/18/2018 Yes            Mobilize per CVICU protocols  Likely to step down later today  Chest tubes per surgery.     More than 50% of time documented was spent in face-to-face contact with the patient and in the care of the patient on the floor/unit where the patient is located.              Sabrina Campbell MD

## 2018-03-09 ENCOUNTER — HOME HEALTH ADMISSION (OUTPATIENT)
Dept: HOME HEALTH SERVICES | Facility: HOME HEALTH | Age: 58
End: 2018-03-09
Payer: COMMERCIAL

## 2018-03-09 ENCOUNTER — APPOINTMENT (OUTPATIENT)
Dept: GENERAL RADIOLOGY | Age: 58
DRG: 220 | End: 2018-03-09
Attending: THORACIC SURGERY (CARDIOTHORACIC VASCULAR SURGERY)
Payer: COMMERCIAL

## 2018-03-09 PROBLEM — J98.11 ATELECTASIS: Status: ACTIVE | Noted: 2018-03-09

## 2018-03-09 PROBLEM — J93.9 PNEUMOTHORAX, RIGHT: Status: ACTIVE | Noted: 2018-03-09

## 2018-03-09 PROBLEM — R09.02 HYPOXEMIA: Status: RESOLVED | Noted: 2018-03-07 | Resolved: 2018-03-09

## 2018-03-09 PROBLEM — Z99.11 ENCOUNTER FOR WEANING FROM VENTILATOR (HCC): Status: RESOLVED | Noted: 2018-03-07 | Resolved: 2018-03-09

## 2018-03-09 LAB
ABO + RH BLD: NORMAL
ANION GAP SERPL CALC-SCNC: 7 MMOL/L (ref 7–16)
BASOPHILS # BLD: 0 K/UL (ref 0–0.2)
BASOPHILS NFR BLD: 0 % (ref 0–2)
BLD PROD TYP BPU: NORMAL
BLD PROD TYP BPU: NORMAL
BLOOD GROUP ANTIBODIES SERPL: NORMAL
BPU ID: NORMAL
BPU ID: NORMAL
BUN SERPL-MCNC: 16 MG/DL (ref 6–23)
CALCIUM SERPL-MCNC: 7.5 MG/DL (ref 8.3–10.4)
CHLORIDE SERPL-SCNC: 107 MMOL/L (ref 98–107)
CO2 SERPL-SCNC: 26 MMOL/L (ref 21–32)
CREAT SERPL-MCNC: 0.78 MG/DL (ref 0.8–1.5)
CROSSMATCH RESULT,%XM: NORMAL
CROSSMATCH RESULT,%XM: NORMAL
DIFFERENTIAL METHOD BLD: ABNORMAL
EOSINOPHIL # BLD: 0.1 K/UL (ref 0–0.8)
EOSINOPHIL NFR BLD: 1 % (ref 0.5–7.8)
ERYTHROCYTE [DISTWIDTH] IN BLOOD BY AUTOMATED COUNT: 13.6 % (ref 11.9–14.6)
GLUCOSE BLD STRIP.AUTO-MCNC: 141 MG/DL (ref 65–100)
GLUCOSE SERPL-MCNC: 128 MG/DL (ref 65–100)
HCT VFR BLD AUTO: 24.9 % (ref 41.1–50.3)
HGB BLD-MCNC: 8.1 G/DL (ref 13.6–17.2)
IMM GRANULOCYTES # BLD: 0 K/UL (ref 0–0.5)
IMM GRANULOCYTES NFR BLD AUTO: 0 % (ref 0–5)
LYMPHOCYTES # BLD: 1.7 K/UL (ref 0.5–4.6)
LYMPHOCYTES NFR BLD: 15 % (ref 13–44)
MAGNESIUM SERPL-MCNC: 2.5 MG/DL (ref 1.8–2.4)
MCH RBC QN AUTO: 29.1 PG (ref 26.1–32.9)
MCHC RBC AUTO-ENTMCNC: 32.5 G/DL (ref 31.4–35)
MCV RBC AUTO: 89.6 FL (ref 79.6–97.8)
MM INDURATION POC: 0 MM (ref 0–5)
MONOCYTES # BLD: 1.3 K/UL (ref 0.1–1.3)
MONOCYTES NFR BLD: 12 % (ref 4–12)
NEUTS SEG # BLD: 8.3 K/UL (ref 1.7–8.2)
NEUTS SEG NFR BLD: 72 % (ref 43–78)
PLATELET # BLD AUTO: 126 K/UL (ref 150–450)
PMV BLD AUTO: 10 FL (ref 10.8–14.1)
POTASSIUM SERPL-SCNC: 4.1 MMOL/L (ref 3.5–5.1)
PPD POC: NORMAL NEGATIVE
RBC # BLD AUTO: 2.78 M/UL (ref 4.23–5.67)
SODIUM SERPL-SCNC: 140 MMOL/L (ref 136–145)
SPECIMEN EXP DATE BLD: NORMAL
STATUS OF UNIT,%ST: NORMAL
STATUS OF UNIT,%ST: NORMAL
UNIT DIVISION, %UDIV: 0
UNIT DIVISION, %UDIV: 0
WBC # BLD AUTO: 11.5 K/UL (ref 4.3–11.1)

## 2018-03-09 PROCEDURE — 74011250637 HC RX REV CODE- 250/637: Performed by: THORACIC SURGERY (CARDIOTHORACIC VASCULAR SURGERY)

## 2018-03-09 PROCEDURE — 85025 COMPLETE CBC W/AUTO DIFF WBC: CPT | Performed by: NURSE PRACTITIONER

## 2018-03-09 PROCEDURE — 97161 PT EVAL LOW COMPLEX 20 MIN: CPT

## 2018-03-09 PROCEDURE — 99232 SBSQ HOSP IP/OBS MODERATE 35: CPT | Performed by: INTERNAL MEDICINE

## 2018-03-09 PROCEDURE — 36415 COLL VENOUS BLD VENIPUNCTURE: CPT | Performed by: NURSE PRACTITIONER

## 2018-03-09 PROCEDURE — 65660000004 HC RM CVT STEPDOWN

## 2018-03-09 PROCEDURE — 71046 X-RAY EXAM CHEST 2 VIEWS: CPT

## 2018-03-09 PROCEDURE — 80048 BASIC METABOLIC PNL TOTAL CA: CPT | Performed by: THORACIC SURGERY (CARDIOTHORACIC VASCULAR SURGERY)

## 2018-03-09 PROCEDURE — 77030012891

## 2018-03-09 PROCEDURE — 82962 GLUCOSE BLOOD TEST: CPT

## 2018-03-09 PROCEDURE — 83735 ASSAY OF MAGNESIUM: CPT | Performed by: THORACIC SURGERY (CARDIOTHORACIC VASCULAR SURGERY)

## 2018-03-09 PROCEDURE — 97530 THERAPEUTIC ACTIVITIES: CPT

## 2018-03-09 PROCEDURE — 74011250637 HC RX REV CODE- 250/637: Performed by: NURSE PRACTITIONER

## 2018-03-09 RX ORDER — CARVEDILOL 12.5 MG/1
25 TABLET ORAL 2 TIMES DAILY WITH MEALS
Status: DISCONTINUED | OUTPATIENT
Start: 2018-03-09 | End: 2018-03-11 | Stop reason: HOSPADM

## 2018-03-09 RX ADMIN — OXYCODONE HYDROCHLORIDE AND ACETAMINOPHEN 1 TABLET: 10; 325 TABLET ORAL at 07:14

## 2018-03-09 RX ADMIN — MUPIROCIN: 20 OINTMENT TOPICAL at 09:15

## 2018-03-09 RX ADMIN — FAMOTIDINE 20 MG: 20 TABLET, FILM COATED ORAL at 09:14

## 2018-03-09 RX ADMIN — Medication 10 ML: at 20:16

## 2018-03-09 RX ADMIN — STANDARDIZED SENNA CONCENTRATE AND DOCUSATE SODIUM 2 TABLET: 8.6; 5 TABLET, FILM COATED ORAL at 20:16

## 2018-03-09 RX ADMIN — POTASSIUM CHLORIDE 10 MEQ: 10 TABLET, EXTENDED RELEASE ORAL at 09:13

## 2018-03-09 RX ADMIN — OXYCODONE HYDROCHLORIDE AND ACETAMINOPHEN 1 TABLET: 10; 325 TABLET ORAL at 02:56

## 2018-03-09 RX ADMIN — AMIODARONE HYDROCHLORIDE 200 MG: 200 TABLET ORAL at 20:15

## 2018-03-09 RX ADMIN — AMIODARONE HYDROCHLORIDE 200 MG: 200 TABLET ORAL at 09:14

## 2018-03-09 RX ADMIN — FAMOTIDINE 20 MG: 20 TABLET, FILM COATED ORAL at 20:15

## 2018-03-09 RX ADMIN — ASPIRIN 81 MG: 81 TABLET, COATED ORAL at 09:14

## 2018-03-09 RX ADMIN — FUROSEMIDE 40 MG: 40 TABLET ORAL at 09:14

## 2018-03-09 RX ADMIN — MUPIROCIN: 20 OINTMENT TOPICAL at 20:16

## 2018-03-09 RX ADMIN — Medication 10 ML: at 15:09

## 2018-03-09 RX ADMIN — CARVEDILOL 25 MG: 12.5 TABLET, FILM COATED ORAL at 17:12

## 2018-03-09 RX ADMIN — ATORVASTATIN CALCIUM 80 MG: 40 TABLET, FILM COATED ORAL at 20:15

## 2018-03-09 RX ADMIN — OXYCODONE HYDROCHLORIDE AND ACETAMINOPHEN 1 TABLET: 10; 325 TABLET ORAL at 13:41

## 2018-03-09 RX ADMIN — Medication 10 ML: at 05:49

## 2018-03-09 RX ADMIN — CARVEDILOL 12.5 MG: 12.5 TABLET, FILM COATED ORAL at 09:14

## 2018-03-09 NOTE — PROGRESS NOTES
[facility] - INPATIENT  Face to Face Encounter    Patients Name: Chel Fleming    YOB: 1960    Ordering Physician: Sunil Mares    Primary Diagnosis: Atherosclerosis of native coronary artery of native heart with unstable angina pectoris ([facility] [I25.110]  Acute ST elevation myocardial infarction (STEMI) involving left main coronary artery without development of Q waves ([facility] [I21.01]  Nonrheumatic aortic valve stenosis [I35.0]    Date of Face to Face:   3/9/2018                                  Face to Face Encounter findings are related to primary reason for home care:   yes. 1. I certify that the patient needs intermittent care as follows: skilled nursing care:  skilled observation/assessment, patient education  physical therapy: strengthening and balance training    2. I certify that this patient is homebound, that is: 1) patient requires the use of a None device, special transportation, or assistance of another to leave the home; or 2) patient's condition makes leaving the home medically contraindicated; and 3) patient has a normal inability to leave the home and leaving the home requires considerable and taxing effort. Patient may leave the home for infrequent and short duration for medical reasons, and occasional absences for non-medical reasons. Homebound status is due to the following functional limitations: Patient currently under activity restrictions secondary to recent surgical procedure, this hinders their ability to safely leave the home. 3. I certify that this patient is under my care and that I, or a nurse practitioner or  [id number], or clinical nurse specialist, or certified nurse midwife, working with me, had a Face-to-Face Encounter that meets the physician Face-to-Face Encounter requirements.   The following are the clinical findings from the [address] [place] [address] encounter that support the need for skilled services and is a summary of the encounter:     See hospital chart. Fermin RadonLAITH  3/9/2018      THE FOLLOWING TO BE COMPLETED BY THE COMMUNITY PHYSICIAN:    I concur with the findings described above from the F2F encounter that this patient is homebound and in need of a skilled service. Certifying Physician: _____________________________________      Printed Certifying Physician Name: _____________________________________    Date: _________________                  3/20/2018     12:23 PM    I have personally seen and examined Jose J Don with  LAURA Gallegos. I agree and confirm findings in history, physical exam, and assessment/plan as outlined above, except as noted below.     Army Braxton MD

## 2018-03-09 NOTE — PROGRESS NOTES
Problem: Mobility Impaired (Adult and Pediatric)  Goal: *Acute Goals and Plan of Care (Insert Text)  LTG:  (1.)Mr. Kwame Henao will move from supine to sit and sit to supine , scoot up and down and roll side to side in bed with INDEPENDENT within 5 treatment day(s). (2.)Mr. Kwame Henao will transfer from bed to chair and chair to bed with INDEPENDENT using the least restrictive device within 5 treatment day(s). (3.)Mr. Kwame Henao will ambulate with INDEPENDENT for 500+ feet with the least restrictive device within 5 treatment day(s). (4.)Mr. Kwame Henao will perform standing static and dynamic balance activities x 8 minutes with SUPERVISION to improve safety within 5 day(s). (5.)Mr. Kwame Henao will ascend and descend 5 stairs using no hand rail(s) with SUPERVISION to improve functional mobility and safety within 5 day(s). ________________________________________________________________________________________________    PHYSICAL THERAPY: Daily Note, Treatment Day: Day of Assessment, PM 3/9/2018  INPATIENT: Hospital Day: 3  Payor: [facility] / Plan: [address] / Product Type: PPO /      NAME/AGE/GENDER: Lucy Gilliam is a 59 y.o. male   PRIMARY DIAGNOSIS: Atherosclerosis of native coronary artery of native heart with unstable angina pectoris (HCC) [I25.110]  Acute ST elevation myocardial infarction (STEMI) involving left main coronary artery without development of Q waves (HCC) [I21.01]  Nonrheumatic aortic valve stenosis [I35.0] Status post coronary artery bypass graft Status post coronary artery bypass graft  Procedure(s) (LRB):  CORONARY ARTERY BYPASS GRAFT CABG X   .  LIMA with AVR (N/A)  JUAN/ANES PROBE  DR ANTON FOR ANES   (N/A)  VEIN HARVEST, GREATER SAPHENOUS (Left)  2 Days Post-Op  ICD-10: Treatment Diagnosis:   · Generalized Muscle Weakness (M62.81)  · Difficulty in walking, Not elsewhere classified (R26.2)   Precaution/Allergies:  Adhesive      ASSESSMENT:     Mr. Kwame Henao presents sitting up in bed. He is agreeable to treatment. He maintained his ambulation distance this treatment. He was then instructed in log rolling for supine to sit and sit to supine. He demonstrated proper technique with sit to supine. Encouraged patient to ambulate with family. Next visit do stair training and give home exercise program. Will continue PT efforts. Good progress noted with all mobility. Should be able to be discharged after next treatment. This section established at most recent assessment   PROBLEM LIST (Impairments causing functional limitations):  1. Decreased Strength  2. Decreased Ambulation Ability/Technique  3. Decreased Balance  4. Increased Pain  5. Increased Fatigue  6. Decreased Knowledge of Precautions  7. Decreased Fleming with Home Exercise Program   INTERVENTIONS PLANNED: (Benefits and precautions of physical therapy have been discussed with the patient.)  1. Balance Exercise  2. Bed Mobility  3. Therapeutic Activites  4. Therapeutic Exercise/Strengthening  5. Transfer Training  6. Group Therapy     TREATMENT PLAN: Frequency/Duration: twice daily for duration of hospital stay  Rehabilitation Potential For Stated Goals: Excellent     RECOMMENDED REHABILITATION/EQUIPMENT: (at time of discharge pending progress): Due to the probability of continued deficits (see above) this patient will likely need continued skilled physical therapy after discharge. Equipment:    None at this time              HISTORY:   History of Present Injury/Illness (Reason for Referral):  Patient is status post above  Past Medical History/Comorbidities:   Mr. Froy Up  has a past medical history of Acute diastolic CHF (congestive heart failure) ([address] (1/19/2018); Adverse effect of anesthesia (2003 in [place]); Asymptomatic carotid artery stenosis, right (3/8/2018); Claudication of left lower extremity ([address] (1/19/2018); Coronary artery disease involving native coronary artery of native heart (01/18/2018);  Dyslipidemia (01/18/2018); Essential hypertension (01/18/2018); Family history of premature CAD (1/19/2018); Ill-defined condition; Snores; STEMI involving left anterior descending coronary artery ([address] (1/18/2018); and Tobacco abuse (1/18/2018). He also has no past medical history of Difficult intubation; Malignant hyperthermia due to anesthesia; Nausea & vomiting; or Pseudocholinesterase deficiency. Mr. Kendrick Ibarra  has a past surgical history that includes pr cardiac surg procedure unlist (01/18/2018) and hx orthopaedic (Right, 01/2003). Social History/Living Environment:   Home Environment: Private residence  # Steps to Enter: 5  One/Two Story Residence: One story  Living Alone: No  Support Systems: Spouse/Significant Other/Partner  Patient Expects to be Discharged to[de-identified] Private residence  Current DME Used/Available at Home: None  Prior Level of Function/Work/Activity:  Patient lives with spouse in one story home. Patient was independent and working prior to MI. Patient was active. Personal Factors:          Overall Behavior:  motivated   Number of Personal Factors/Comorbidities that affect the Plan of Care: 0: LOW COMPLEXITY   EXAMINATION:   Most Recent Physical Functioning:   Gross Assessment:                  Posture:  Posture (WDL): Exceptions to WDL  Posture Assessment:  Forward head  Balance:  Sitting: Intact  Standing: Impaired  Standing - Static: Good  Standing - Dynamic : Fair Bed Mobility:  Rolling: Supervision  Supine to Sit: Supervision (with a flat bed)  Sit to Supine: Supervision (with head of bed elevated)  Wheelchair Mobility:     Transfers:  Sit to Stand: Supervision  Stand to Sit: Supervision  Gait:     Base of Support: Widened  Speed/Dixie: Pace decreased (<100 feet/min)  Step Length: Left shortened;Right shortened  Gait Abnormalities: Decreased step clearance  Distance (ft): 250 Feet (ft)  Assistive Device:  (none)  Ambulation - Level of Assistance: Stand-by assistance  Interventions: Safety awareness training;Verbal cues      Body Structures Involved:  1. Heart  2. Lungs Body Functions Affected:  1. Cardio  2. Movement Related Activities and Participation Affected:  1. General Tasks and Demands  2. Mobility  3. Domestic Life   Number of elements that affect the Plan of Care: 4+: HIGH COMPLEXITY   CLINICAL PRESENTATION:   Presentation: Stable and uncomplicated: LOW COMPLEXITY   CLINICAL DECISION MAKIN[address] [place] [address] AM-PAC 6 Clicks   Basic Mobility Inpatient Short Form  How much difficulty does the patient currently have. .. Unable A Lot A Little None   1. Turning over in bed (including adjusting bedclothes, sheets and blankets)? [] 1   [] 2   [x] 3   [] 4   2. Sitting down on and standing up from a chair with arms ( e.g., wheelchair, bedside commode, etc.)   [] 1   [] 2   [] 3   [x] 4   3. Moving from lying on back to sitting on the side of the bed? [] 1   [] 2   [x] 3   [] 4   How much help from another person does the patient currently need. .. Total A Lot A Little None   4. Moving to and from a bed to a chair (including a wheelchair)? [] 1   [] 2   [] 3   [x] 4   5. Need to walk in hospital room? [] 1   [] 2   [] 3   [x] 4   6. Climbing 3-5 steps with a railing? [] 1   [] 2   [] 3   [x] 4   © , Trustees of [address] [place] [address], under license to [employer]. All rights reserved      Score:  Initial: 22 Most Recent: X (Date: -- )    Interpretation of Tool:  Represents activities that are increasingly more difficult (i.e. Bed mobility, Transfers, Gait). Score 24 23 22-20 19-15 14-10 9-7 6     Modifier CH CI CJ CK CL CM CN      ?  Mobility - Walking and Moving Around:     - CURRENT STATUS: CJ - 20%-39% impaired, limited or restricted    - GOAL STATUS: CI - 1%-19% impaired, limited or restricted    - D/C STATUS:  ---------------To be determined---------------  Payor: BLUE CROSS / Plan: [place]OSS [address] / Product Type: PPO /      Medical Necessity: · Patient demonstrates excellent rehab potential due to higher previous functional level. · Skilled intervention continues to be required due to decline in overall functional mobility. Reason for Services/Other Comments:  · Patient continues to require present interventions due to patient's inability to perform functional mobility at previous independence level. Use of outcome tool(s) and clinical judgement create a POC that gives a: Clear prediction of patient's progress: LOW COMPLEXITY            TREATMENT:   (In addition to Assessment/Re-Assessment sessions the following treatments were rendered)   Pre-treatment Symptoms/Complaints:  \"I had just settled in for my long pina night. \"  Pain: Initial:   Pain Intensity 1: 0  Post Session:  No pain complaints noted     Therapeutic Activity: (    15 MInutes): Therapeutic activities including Bed transfers and Ambulation on level ground to improve mobility and activity tolerance. Required minimal Safety awareness training;Verbal cues to promote dynamic balance in standing. Braces/Orthotics/Lines/Etc:   · O2 Device: Room air  Treatment/Session Assessment:    · Response to Treatment:  Tolerated well, no SOB noted  · Interdisciplinary Collaboration:   o Physical Therapy Assistant  o Registered Nurse  · After treatment position/precautions:   o Supine in bed  o Bed/Chair-wheels locked  o Bed in low position  o Call light within reach  o RN notified   · Compliance with Program/Exercises: compliant all of the time. · Recommendations/Intent for next treatment session: \"Next visit will focus on advancements to more challenging activities and reduction in assistance provided\".   Total Treatment Duration:  PT Patient Time In/Time Out  Time In: 1420  Time Out: [address], PTA

## 2018-03-09 NOTE — PROGRESS NOTES
Bedside and Verbal shift change report given to Juliette Soto (oncoming nurse) by Narda Seip (offgoing nurse). Report included the following information SBAR, Kardex, Intake/Output, MAR, Recent Results and Med Rec Status.

## 2018-03-09 NOTE — PROGRESS NOTES
CV Progress Note    Admit Date: 3/7/2018    Postop: CABG x 3 /AVR    Subjective:     Patient present conditions:     Review of Systems   Cardiac: regular rhythm  Respiratory: room air, using IS  Neuro: moving all extremities   Incision: dressings Dry and healing  GI: tolerating po   Chest Tubes: out  TPW: out    Objective:     Vitals:  Blood pressure 140/63, pulse 93, temperature 98.8 °F (37.1 °C), resp. rate 18, height 5' 10\" (1.778 m), weight 220 lb 8 oz (100 kg), SpO2 (!) 89 %. Vitals:    03/08/18 2242 03/08/18 2330 03/09/18 0259 03/09/18 0730   BP: 116/57  115/58 140/63   Pulse: 95  97 93   Resp: 18  18 18   Temp: 98.2 °F (36.8 °C)  98.5 °F (36.9 °C) 98.8 °F (37.1 °C)   SpO2: 92%  91% (!) 89%   Weight:  220 lb 8 oz (100 kg)     Height:            I/O:     03/07 1901 - 03/09 0700  In: 2933.7 [P.O.:1340;  I.V.:1593.7]  Out: 6931 [Urine:1991]  Last 3 Recorded Weights in this Encounter    03/07/18 0550 03/08/18 0606 03/08/18 2330   Weight: 210 lb 6 oz (95.4 kg) 219 lb 2.2 oz (99.4 kg) 220 lb 8 oz (100 kg)       Intake/Output Summary (Last 24 hours) at 03/09/18 1051  Last data filed at 03/08/18 2246   Gross per 24 hour   Intake             1100 ml   Output              475 ml   Net              625 ml           Heart: RRR, slightly tachy  Lung: stable on RA, using IS  Neuro: grossly intact, conversant  Incisions: healing, dry  Chest Tubes: out  TPW; out    ECG/Telemetry:     Lab/Data Review:  Recent Results (from the past 12 hour(s))   CBC WITH AUTOMATED DIFF    Collection Time: 03/09/18  4:31 AM   Result Value Ref Range    WBC 11.5 (H) 4.3 - 11.1 K/uL    RBC 2.78 (L) 4.23 - 5.67 M/uL    HGB 8.1 (L) 13.6 - 17.2 g/dL    HCT 24.9 (L) 41.1 - 50.3 %    MCV 89.6 79.6 - 97.8 FL    MCH 29.1 26.1 - 32.9 PG    MCHC 32.5 31.4 - 35.0 g/dL    RDW 13.6 11.9 - 14.6 %    PLATELET 492 (L) 008 - 450 K/uL    MPV 10.0 (L) 10.8 - 14.1 FL    DF AUTOMATED      NEUTROPHILS 72 43 - 78 %    LYMPHOCYTES 15 13 - 44 %    MONOCYTES 12 4.0 - 12.0 %    EOSINOPHILS 1 0.5 - 7.8 %    BASOPHILS 0 0.0 - 2.0 %    IMMATURE GRANULOCYTES 0 0.0 - 5.0 %    ABS. NEUTROPHILS 8.3 (H) 1.7 - 8.2 K/UL    ABS. LYMPHOCYTES 1.7 0.5 - 4.6 K/UL    ABS. MONOCYTES 1.3 0.1 - 1.3 K/UL    ABS. EOSINOPHILS 0.1 0.0 - 0.8 K/UL    ABS. BASOPHILS 0.0 0.0 - 0.2 K/UL    ABS. IMM. GRANS. 0.0 0.0 - 0.5 K/UL   METABOLIC PANEL, BASIC    Collection Time: 03/09/18  4:31 AM   Result Value Ref Range    Sodium 140 136 - 145 mmol/L    Potassium 4.1 3.5 - 5.1 mmol/L    Chloride 107 98 - 107 mmol/L    CO2 26 21 - 32 mmol/L    Anion gap 7 7 - 16 mmol/L    Glucose 128 (H) 65 - 100 mg/dL    BUN 16 6 - 23 MG/DL    Creatinine 0.78 (L) 0.8 - 1.5 MG/DL    GFR est AA >60 >60 ml/min/1.73m2    GFR est non-AA >60 >60 ml/min/1.73m2    Calcium 7.5 (L) 8.3 - 10.4 MG/DL   MAGNESIUM    Collection Time: 03/09/18  4:31 AM   Result Value Ref Range    Magnesium 2.5 (H) 1.8 - 2.4 mg/dL   GLUCOSE, POC    Collection Time: 03/09/18  5:49 AM   Result Value Ref Range    Glucose (POC) 141 (H) 65 - 100 mg/dL     CMP:   Lab Results   Component Value Date/Time     03/09/2018 04:31 AM    K 4.1 03/09/2018 04:31 AM     03/09/2018 04:31 AM    CO2 26 03/09/2018 04:31 AM    AGAP 7 03/09/2018 04:31 AM     (H) 03/09/2018 04:31 AM    BUN 16 03/09/2018 04:31 AM    CREA 0.78 (L) 03/09/2018 04:31 AM    GFRAA >60 03/09/2018 04:31 AM    GFRNA >60 03/09/2018 04:31 AM    CA 7.5 (L) 03/09/2018 04:31 AM    MG 2.5 (H) 03/09/2018 04:31 AM     CBC:   Lab Results   Component Value Date/Time    WBC 11.5 (H) 03/09/2018 04:31 AM    HGB 8.1 (L) 03/09/2018 04:31 AM    HCT 24.9 (L) 03/09/2018 04:31 AM     (L) 03/09/2018 04:31 AM       Liver Panel: No results found for: ALB, CBIL, TBIL, TP, GLOB, AGRAT, SGOT, ASTPOC, ALTPOC, ALT, GPT, AP     Radiology:     Assessment:     Stable POD 2 CABG x 3/AVR.        Plan/Recommendations/Medical Decision Making:     Tachycardia- will titrate up to coreg 25 mg today- monitor rate  Off oxygen, stable- encourage IS  EF 50-55% 3/2/18 on pre op Echo done at [facility]        See orders    Vasquez Tiwari NP  3/9/2018

## 2018-03-09 NOTE — PROGRESS NOTES
Problem: Mobility Impaired (Adult and Pediatric)  Goal: *Acute Goals and Plan of Care (Insert Text)  LTG:  (1.)Mr. Eliazar Schmitt will move from supine to sit and sit to supine , scoot up and down and roll side to side in bed with INDEPENDENT within 5 treatment day(s). (2.)Mr. Eliazar Schmitt will transfer from bed to chair and chair to bed with INDEPENDENT using the least restrictive device within 5 treatment day(s). (3.)Mr. Eliazar Schmitt will ambulate with INDEPENDENT for 500+ feet with the least restrictive device within 5 treatment day(s). (4.)Mr. Eliazar Schmitt will perform standing static and dynamic balance activities x 8 minutes with SUPERVISION to improve safety within 5 day(s). (5.)Mr. Eliazar Schmitt will ascend and descend 5 stairs using no hand rail(s) with SUPERVISION to improve functional mobility and safety within 5 day(s). ________________________________________________________________________________________________    PHYSICAL THERAPY: Initial Assessment, AM 3/9/2018  INPATIENT: Hospital Day: 3  Payor: Uriel Decker / Plan: [facility] / Product Type: PPO /      NAME/AGE/GENDER: Aggie To is a 59 y.o. male   PRIMARY DIAGNOSIS: Atherosclerosis of native coronary artery of native heart with unstable angina pectoris (HCC) [I25.110]  Acute ST elevation myocardial infarction (STEMI) involving left main coronary artery without development of Q waves (HCC) [I21.01]  Nonrheumatic aortic valve stenosis [I35.0] Status post coronary artery bypass graft Status post coronary artery bypass graft  Procedure(s) (LRB):  CORONARY ARTERY BYPASS GRAFT CABG X   .  LIMA with AVR (N/A)  JUAN/ANES PROBE  DR ANTON FOR ANES   (N/A)  VEIN HARVEST, GREATER SAPHENOUS (Left)  2 Days Post-Op  ICD-10: Treatment Diagnosis:   · Generalized Muscle Weakness (M62.81)  · Difficulty in walking, Not elsewhere classified (R26.2)   Precaution/Allergies:  Adhesive      ASSESSMENT:     Mr. Eliazar Schmitt is status post above and presents with decreased functional mobility, pain limiting activity tolerance, and overall impaired balance. Patient was supine in bed on arrival to room and agreeable to PT evaluation. Patient was able to transfer with supervision with head of bed elevated. Patient then ambulated in hallway while holding heart pillow. Patient did well with ambulation and commented he has already ambulated once with his spouse. Encouraged further mobility with spouse during the day. Patient is functioning well and should progress quickly. Will give exercises this afternoon and review stair mobility. Patient would benefit from one or two more PT sessions then discharge. This section established at most recent assessment   PROBLEM LIST (Impairments causing functional limitations):  1. Decreased Strength  2. Decreased Ambulation Ability/Technique  3. Decreased Balance  4. Increased Pain  5. Increased Fatigue  6. Decreased Knowledge of Precautions  7. Decreased [place] with Home Exercise Program   INTERVENTIONS PLANNED: (Benefits and precautions of physical therapy have been discussed with the patient.)  1. Balance Exercise  2. Bed Mobility  3. Therapeutic Activites  4. Therapeutic Exercise/Strengthening  5. Transfer Training  6. Group Therapy     TREATMENT PLAN: Frequency/Duration: twice daily for duration of hospital stay  Rehabilitation Potential For Stated Goals: Excellent     RECOMMENDED REHABILITATION/EQUIPMENT: (at time of discharge pending progress): Due to the probability of continued deficits (see above) this patient will likely need continued skilled physical therapy after discharge. Equipment:    None at this time              HISTORY:   History of Present Injury/Illness (Reason for Referral):  Patient is status post above  Past Medical History/Comorbidities:   Mr. Jayy Camargo  has a past medical history of Acute diastolic CHF (congestive heart failure) ([facility] [address] (1/19/2018);  Adverse effect of anesthesia (2003 in [place]); Asymptomatic carotid artery stenosis, right (3/8/2018); Claudication of left lower extremity ([facility] [address] (1/19/2018); Coronary artery disease involving native coronary artery of native heart (01/18/2018); Dyslipidemia (01/18/2018); Essential hypertension (01/18/2018); Family history of premature CAD (1/19/2018); Ill-defined condition; Snores; STEMI involving left anterior descending coronary artery ([facility] [address] (1/18/2018); and Tobacco abuse (1/18/2018). He also has no past medical history of Difficult intubation; Malignant hyperthermia due to anesthesia; Nausea & vomiting; or Pseudocholinesterase deficiency. Mr. Misael Tanner  has a past surgical history that includes pr cardiac surg procedure unlist (01/18/2018) and hx orthopaedic (Right, 01/2003). Social History/Living Environment:   Home Environment: Private residence  # Steps to Enter: 5  One/Two Story Residence: One story  Living Alone: No  Support Systems: Spouse/Significant Other/Partner  Patient Expects to be Discharged to[de-identified] Private residence  Current DME Used/Available at Home: None  Prior Level of Function/Work/Activity:  Patient lives with spouse in one story home. Patient was independent and working prior to MI. Patient was active. Personal Factors:          Overall Behavior:  motivated   Number of Personal Factors/Comorbidities that affect the Plan of Care: 0: LOW COMPLEXITY   EXAMINATION:   Most Recent Physical Functioning:   Gross Assessment:  AROM: Within functional limits  Strength: Generally decreased, functional  Coordination: Within functional limits  Sensation: Intact               Posture:  Posture (WDL): Exceptions to WDL  Posture Assessment:  Forward head  Balance:  Sitting: Intact  Standing: Impaired  Standing - Static: Good  Standing - Dynamic : Fair Bed Mobility:  Rolling:  (hea of bed elevated)  Supine to Sit: Stand-by assistance  Wheelchair Mobility:     Transfers:  Sit to Stand: Stand-by assistance  Stand to Sit: Stand-by assistance  Gait:     Base of Support: Widened  Speed/Dixie: Slow  Step Length: Right shortened;Left shortened  Gait Abnormalities: Decreased step clearance; Path deviations  Distance (ft): 250 Feet (ft)  Assistive Device:  (none, held heart pillow)  Ambulation - Level of Assistance: Stand-by assistance  Interventions: Verbal cues; Safety awareness training      Body Structures Involved:  1. Heart  2. Lungs Body Functions Affected:  1. Cardio  2. Movement Related Activities and Participation Affected:  1. General Tasks and Demands  2. Mobility  3. Domestic Life   Number of elements that affect the Plan of Care: 4+: HIGH COMPLEXITY   CLINICAL PRESENTATION:   Presentation: Stable and uncomplicated: LOW COMPLEXITY   CLINICAL DECISION MAKIN[address] [place] [id number] AM-PAC 6 Clicks   Basic Mobility Inpatient Short Form  How much difficulty does the patient currently have. .. Unable A Lot A Little None   1. Turning over in bed (including adjusting bedclothes, sheets and blankets)? [] 1   [] 2   [x] 3   [] 4   2. Sitting down on and standing up from a chair with arms ( e.g., wheelchair, bedside commode, etc.)   [] 1   [] 2   [] 3   [x] 4   3. Moving from lying on back to sitting on the side of the bed? [] 1   [] 2   [x] 3   [] 4   How much help from another person does the patient currently need. .. Total A Lot A Little None   4. Moving to and from a bed to a chair (including a wheelchair)? [] 1   [] 2   [] 3   [x] 4   5. Need to walk in hospital room? [] 1   [] 2   [] 3   [x] 4   6. Climbing 3-5 steps with a railing? [] 1   [] 2   [] 3   [x] 4   © , Trustees of [address] [place] [id number], under license to [employer]. All rights reserved      Score:  Initial: 22 Most Recent: X (Date: -- )    Interpretation of Tool:  Represents activities that are increasingly more difficult (i.e. Bed mobility, Transfers, Gait). Score 24 23 22-20 19-15 14-10 9-7 6     Modifier CH CI CJ CK CL CM CN      ?  Mobility - Walking and Moving Around:     - CURRENT STATUS: CJ - 20%-39% impaired, limited or restricted    - GOAL STATUS: CI - 1%-19% impaired, limited or restricted    - D/C STATUS:  ---------------To be determined---------------  Payor: BLUE CROSS / Plan: [place] [facility] / Product Type: PPO /      Medical Necessity:     · Patient demonstrates excellent rehab potential due to higher previous functional level. · Skilled intervention continues to be required due to decline in overall functional mobility. Reason for Services/Other Comments:  · Patient continues to require present interventions due to patient's inability to perform functional mobility at previous independence level. Use of outcome tool(s) and clinical judgement create a POC that gives a: Clear prediction of patient's progress: LOW COMPLEXITY            TREATMENT:   (In addition to Assessment/Re-Assessment sessions the following treatments were rendered)   Pre-treatment Symptoms/Complaints: \"My leg hurts a bit, but its okay\"  Pain: Initial:   Pain Intensity 1: 7  Pain Intervention(s) 1: Ambulation/Increased Activity, Nurse notified, Repositioned  Post Session:  Pain in leg/chest, RN notified     Assessment/Reassessment only, no treatment provided today    Braces/Orthotics/Lines/Etc:   · O2 Device: Room air  Treatment/Session Assessment:    · Response to Treatment:  Tolerated well, no SOB noted  · Interdisciplinary Collaboration:   o Physical Therapist  o Registered Nurse  · After treatment position/precautions:   o Up in chair  o Call light within reach  o RN notified   · Compliance with Program/Exercises: compliant all of the time. · Recommendations/Intent for next treatment session: \"Next visit will focus on advancements to more challenging activities and reduction in assistance provided\".   Total Treatment Duration:  PT Patient Time In/Time Out  Time In: 1022  Time Out: Mark Melo DPT

## 2018-03-09 NOTE — PROGRESS NOTES
[facility]  Admission Date: 3/7/2018             Daily Progress Note: 3/9/2018    The patient's chart is reviewed and the patient is discussed with the staff. 58 yo male with a history of tobacco abuse, HTN, HL, CAD, and AV Stensosis. Admitted 1/17/18 with STEMI. [facility] with severe MVCAD and EF 40% - cho on admission showed EF 30 % to 35 %, akinesis of the apical inferior, apical septal, apical lateral, and apical wall(s), with grade III diastolic dysfunction (reviewed by Dr. Leighton De Leon). He is now s/P CABG x 3 with AVR. Normal pre-op spirometry. Seen by Vascular Surgery for carotid disease with plans for outpatient follow up. Subjective:     Currently on RA with o2 sat 91%. + dry cough. Using IS and drawing 7073-7106 ml. Incisional pain controlled with pain medications.       Current Facility-Administered Medications   Medication Dose Route Frequency    sodium chloride (NS) flush 5-10 mL  5-10 mL IntraVENous Q8H    sodium chloride (NS) flush 5-10 mL  5-10 mL IntraVENous PRN    magnesium hydroxide (MILK OF MAGNESIA) 400 mg/5 mL oral suspension 30 mL  30 mL Oral DAILY PRN    alum-mag hydroxide-simeth (MYLANTA) oral suspension 30 mL  30 mL Oral Q4H PRN    ondansetron (ZOFRAN) injection 4 mg  4 mg IntraVENous Q6H PRN    acetaminophen (TYLENOL) tablet 650 mg  650 mg Oral Q4H PRN    zolpidem (AMBIEN) tablet 5 mg  5 mg Oral QHS PRN    aspirin delayed-release tablet 81 mg  81 mg Oral DAILY    magnesium oxide (MAG-OX) tablet 400 mg  400 mg Oral TID PRN    magnesium oxide (MAG-OX) tablet 400 mg  400 mg Oral QID PRN    potassium chloride (K-DUR, KLOR-CON) SR tablet 20 mEq  20 mEq Oral BID PRN    potassium chloride (K-DUR, KLOR-CON) SR tablet 40 mEq  40 mEq Oral BID PRN    traMADol (ULTRAM) tablet 50 mg  50 mg Oral Q6H PRN    amiodarone (CORDARONE) tablet 200 mg  200 mg Oral Q12H    famotidine (PEPCID) tablet 20 mg  20 mg Oral Q12H    furosemide (LASIX) tablet 40 mg  40 mg Oral DAILY    mupirocin (BACTROBAN) 2 % ointment   Both Nostrils Q12H    potassium chloride (KLOR-CON) tablet 10 mEq  10 mEq Oral DAILY    senna-docusate (PERICOLACE) 8.6-50 mg per tablet 2 Tab  2 Tab Oral QHS    insulin lispro (HUMALOG) injection   SubCUTAneous AC&HS    carvedilol (COREG) tablet 12.5 mg  12.5 mg Oral BID WITH MEALS    atorvastatin (LIPITOR) tablet 80 mg  80 mg Oral QHS    oxyCODONE-acetaminophen (PERCOCET 10)  mg per tablet 1 Tab  1 Tab Oral Q4H PRN       Review of Systems  Constitutional: negative for fever, chills, sweats  Cardiovascular: negative for chest pain, palpitations, syncope, edema  Gastrointestinal:  negative for dysphagia, reflux, vomiting, diarrhea, abdominal pain, or melena  Neurologic:  negative for focal weakness, numbness, headache    Objective:     Vitals:    03/08/18 1951 03/08/18 2242 03/08/18 2330 03/09/18 0259   BP:  116/57  115/58   Pulse:  95  97   Resp:  18  18   Temp:  98.2 °F (36.8 °C)  98.5 °F (36.9 °C)   SpO2: 93% 92%  91%   Weight:   220 lb 8 oz (100 kg)    Height:         Intake and Output:   03/07 1901 - 03/09 0700  In: 2933.7 [P.O.:1340; I.V.:1593.7]  Out: 2597 [Urine:1991]       Physical Exam:   Constitution:  the patient is well developed and in no acute distress  EENMT:  Sclera clear, pupils equal, oral mucosa moist  Respiratory: diminished with crackles bilateral posterior bases, RA  Cardiovascular:  RRR without M,G,R  Gastrointestinal: soft and non-tender; with positive bowel sounds. Musculoskeletal: warm without cyanosis. There is no lower leg edema.   Skin:  no jaundice or rashes, midline sternal incision without redness, swelling, or drainage   Neurologic: no gross neuro deficits     Psychiatric:  alert and oriented x 3    CXR:         LAB  Recent Labs      03/09/18   0549  03/08/18   2051  03/08/18   1611  03/08/18   1210  03/08/18   0759   GLUCPOC  141*  156*  128*  100  128*      Recent Labs      03/09/18   0431  03/08/18   0306  03/07/18 2320  03/07/18   1924  03/07/18   1529   WBC  11.5*  8.5   --    --   13.6*   HGB  8.1*  8.4*  8.7*  9.6*  10.6*   HCT  24.9*  26.0*  26.5*  29.0*  32.2*   PLT  126*  130*   --    --   163   INR   --    --    --   1.6  1.7     Recent Labs      03/09/18   0431  03/08/18   0306  03/07/18   2320   03/07/18   1529   NA  140  146*   --    --   148*   K  4.1  4.4  4.4   < >  4.5   CL  107  116*   --    --   117*   CO2  26  24   --    --   25   GLU  128*  104*   --    --   94   BUN  16  12   --    --   14   CREA  0.78*  0.78*   --    --   0.77*   MG  2.5*  2.6*  2.6*   < >  3.4*   CA  7.5*  7.1*   --    --   7.2*    < > = values in this interval not displayed.      Recent Labs      03/07/18   1505   PH  7.33*   PCO2  46*   PO2  115*   HCO3  24       Assessment:  (Medical Decision Making)     Hospital Problems  Date Reviewed: 3/9/2018          Codes Class Noted POA    Postoperative anemia due to acute blood loss ICD-10-CM: D62  ICD-9-CM: 285.1  3/8/2018 No    Hgb 8.1      Myocardial infarction of anterior wall (HCC) ICD-10-CM: I21.09  ICD-9-CM: 410.10  3/8/2018 Yes        Thrombocytopenia due to extra corporeal by-pass circulation ICD-10-CM: D69.59  ICD-9-CM: 287.49  3/8/2018 No    plt 126      Asymptomatic carotid artery stenosis, right (Chronic) ICD-10-CM: O45.98  ICD-9-CM: 433.10  3/8/2018 Yes    Seen by Vascular Surgery with plans for outpatient follow up      * (Principal)Status post coronary artery bypass graft ICD-10-CM: Z95.1  ICD-9-CM: V45.81  3/7/2018 Yes        Hypoxemia ICD-10-CM: R09.02  ICD-9-CM: 799.02  3/7/2018 Yes    Now on RA      S/P AVR (aortic valve replacement) ICD-10-CM: Z95.2  ICD-9-CM: V43.3  3/7/2018 Yes        Nonrheumatic aortic valve stenosis ICD-10-CM: I35.0  ICD-9-CM: 424.1  1/20/2018 Yes        STEMI involving left anterior descending coronary artery (HCC) ICD-10-CM: I21.02  ICD-9-CM: 410.10  1/18/2018 Yes     anterior STEMI 1/19/18 (less than 7 weeks prior to admission)             Coronary artery disease involving native coronary artery of native heart (Chronic) ICD-10-CM: I25.10  ICD-9-CM: 414.01  1/18/2018 Yes     3/7/18 (Dr Leighton De Leon) CORONARY ARTERY BYPASS GRAFT CABG X 3. LIMA to the LAD, SVG to the L PDA and SVG to the ramus,  AVR using 23 mm Davidson bovine pericardial valve  VEIN HARVEST, GREATER SAPHENOUS         Essential hypertension (Chronic) ICD-10-CM: I10  ICD-9-CM: 401.9  1/18/2018 Yes        Tobacco abuse (Chronic) ICD-10-CM: Z72.0  ICD-9-CM: 305.1  1/18/2018 Yes       Atelectasis:  Bilaterally on cxr. Pneumothorax, left:  Tiny. F/u CXR tomorrow  Plan:  (Medical Decision Making)     --continue IS  --mobilize  --CXR pending    Doing well from a pulmonary standpoint    More than 50% of the time documented was spent in face-to-face contact with the patient and in the care of the patient on the floor/unit where the patient is located. César Mabry NP  I have spoken with and examined the patient. I agree with the above assessment and plan as documented. CXR suggests tiny left apical pneumothorax. No intervention required.     Lungs:  CTAB  Heart:  RRR with no Murmur/Rubs/Gallops  Abd: NABS  Ext:trace b/l hand edema    --Continue IS  --2lpm tonight  --cXR in am      Idalia Mckeon MD

## 2018-03-09 NOTE — PROGRESS NOTES
Bedside and Verbal shift change report given to Chapo Wharton (oncoming nurse) by [facility]  (offgoing nurse). Report included the following information SBAR, Kardex, Intake/Output, MAR, Recent Results and Med Rec Status.

## 2018-03-09 NOTE — PROGRESS NOTES
JOEL assessed pt due to s/p CABG. He lives with his wife and two young adult children in a one level home with five steps at entrance. He is ADL-independent, doesn't use anything for ambulation, and doesn't use or need any DMEs (although he has a shower chair and wheelchair from when he took care of his mother-in-law). He confirmed his PCP and insurance as listed on chart and doesn't have any trouble getting his medications. He has chosen [facility] for his post discharge needs. He also wants to ensure he no longer has to wear his Life Vest.    JOEL entered referral for [facility] and notified Marquise Arnie also asked Aden Jacksonmirian about the LV who said she would let pt know this weekend after further evaluation. JOEL informed him of this. Care Management Interventions  PCP Verified by CM: Yes  Last Visit to PCP: 09/09/17  Mode of Transport at Discharge:  Other (see comment) (Family)  Transition of Care Consult (CM Consult): [address]: Yes  Physical Therapy Consult: Yes  Current Support Network: Lives with Spouse, Own Home  Confirm Follow Up Transport: Family  Plan discussed with Pt/Family/Caregiver: Yes  Freedom of Choice Offered: Yes  Discharge Location  Discharge Placement: Home with [facility]

## 2018-03-10 ENCOUNTER — APPOINTMENT (OUTPATIENT)
Dept: GENERAL RADIOLOGY | Age: 58
DRG: 220 | End: 2018-03-10
Attending: INTERNAL MEDICINE
Payer: COMMERCIAL

## 2018-03-10 LAB
MAGNESIUM SERPL-MCNC: 2.1 MG/DL (ref 1.8–2.4)
MM INDURATION POC: 0 MM (ref 0–5)
POTASSIUM SERPL-SCNC: 3.8 MMOL/L (ref 3.5–5.1)
PPD POC: NORMAL NEGATIVE

## 2018-03-10 PROCEDURE — 71045 X-RAY EXAM CHEST 1 VIEW: CPT

## 2018-03-10 PROCEDURE — 97110 THERAPEUTIC EXERCISES: CPT

## 2018-03-10 PROCEDURE — 77030012891

## 2018-03-10 PROCEDURE — 74011250637 HC RX REV CODE- 250/637: Performed by: NURSE PRACTITIONER

## 2018-03-10 PROCEDURE — 65660000004 HC RM CVT STEPDOWN

## 2018-03-10 PROCEDURE — 84132 ASSAY OF SERUM POTASSIUM: CPT | Performed by: NURSE PRACTITIONER

## 2018-03-10 PROCEDURE — 83735 ASSAY OF MAGNESIUM: CPT | Performed by: NURSE PRACTITIONER

## 2018-03-10 PROCEDURE — 99232 SBSQ HOSP IP/OBS MODERATE 35: CPT | Performed by: INTERNAL MEDICINE

## 2018-03-10 PROCEDURE — 74011250637 HC RX REV CODE- 250/637: Performed by: THORACIC SURGERY (CARDIOTHORACIC VASCULAR SURGERY)

## 2018-03-10 PROCEDURE — 97530 THERAPEUTIC ACTIVITIES: CPT

## 2018-03-10 PROCEDURE — 36415 COLL VENOUS BLD VENIPUNCTURE: CPT | Performed by: NURSE PRACTITIONER

## 2018-03-10 RX ADMIN — OXYCODONE HYDROCHLORIDE AND ACETAMINOPHEN 1 TABLET: 10; 325 TABLET ORAL at 13:40

## 2018-03-10 RX ADMIN — Medication 10 ML: at 13:41

## 2018-03-10 RX ADMIN — ALUMINUM HYDROXIDE, MAGNESIUM HYDROXIDE, AND SIMETHICONE 30 ML: 200; 200; 20 SUSPENSION ORAL at 19:38

## 2018-03-10 RX ADMIN — MAGNESIUM HYDROXIDE 30 ML: 400 SUSPENSION ORAL at 08:57

## 2018-03-10 RX ADMIN — Medication 10 ML: at 22:00

## 2018-03-10 RX ADMIN — AMIODARONE HYDROCHLORIDE 200 MG: 200 TABLET ORAL at 08:08

## 2018-03-10 RX ADMIN — POTASSIUM CHLORIDE 20 MEQ: 20 TABLET, EXTENDED RELEASE ORAL at 08:09

## 2018-03-10 RX ADMIN — AMIODARONE HYDROCHLORIDE 200 MG: 200 TABLET ORAL at 21:07

## 2018-03-10 RX ADMIN — FAMOTIDINE 20 MG: 20 TABLET, FILM COATED ORAL at 08:09

## 2018-03-10 RX ADMIN — POTASSIUM CHLORIDE 10 MEQ: 10 TABLET, EXTENDED RELEASE ORAL at 08:09

## 2018-03-10 RX ADMIN — ATORVASTATIN CALCIUM 80 MG: 40 TABLET, FILM COATED ORAL at 21:07

## 2018-03-10 RX ADMIN — MUPIROCIN: 20 OINTMENT TOPICAL at 08:19

## 2018-03-10 RX ADMIN — CARVEDILOL 25 MG: 12.5 TABLET, FILM COATED ORAL at 16:46

## 2018-03-10 RX ADMIN — OXYCODONE HYDROCHLORIDE AND ACETAMINOPHEN 1 TABLET: 10; 325 TABLET ORAL at 21:11

## 2018-03-10 RX ADMIN — FAMOTIDINE 20 MG: 20 TABLET, FILM COATED ORAL at 21:06

## 2018-03-10 RX ADMIN — Medication 10 ML: at 05:54

## 2018-03-10 RX ADMIN — CARVEDILOL 25 MG: 12.5 TABLET, FILM COATED ORAL at 08:08

## 2018-03-10 RX ADMIN — MUPIROCIN: 20 OINTMENT TOPICAL at 21:00

## 2018-03-10 RX ADMIN — STANDARDIZED SENNA CONCENTRATE AND DOCUSATE SODIUM 2 TABLET: 8.6; 5 TABLET, FILM COATED ORAL at 21:07

## 2018-03-10 RX ADMIN — OXYCODONE HYDROCHLORIDE AND ACETAMINOPHEN 1 TABLET: 10; 325 TABLET ORAL at 08:08

## 2018-03-10 RX ADMIN — FUROSEMIDE 40 MG: 40 TABLET ORAL at 08:08

## 2018-03-10 RX ADMIN — ASPIRIN 81 MG: 81 TABLET, COATED ORAL at 08:09

## 2018-03-10 NOTE — PROGRESS NOTES
Problem: Mobility Impaired (Adult and Pediatric)  Goal: *Acute Goals and Plan of Care (Insert Text)  LTG:  (1.)Mr. Kiara Vega will move from supine to sit and sit to supine , scoot up and down and roll side to side in bed with INDEPENDENT within 5 treatment day(s). (2.)Mr. Kiara Vega will transfer from bed to chair and chair to bed with INDEPENDENT using the least restrictive device within 5 treatment day(s). (3.)Mr. Kiara Vega will ambulate with INDEPENDENT for 500+ feet with the least restrictive device within 5 treatment day(s). (4.)Mr. Kiara Vega will perform standing static and dynamic balance activities x 8 minutes with SUPERVISION to improve safety within 5 day(s). (5.)Mr. Kiara Vega will ascend and descend 5 stairs using no hand rail(s) with SUPERVISION to improve functional mobility and safety within 5 day(s). ________________________________________________________________________________________________    PHYSICAL THERAPY: Daily Note, Discharge, Treatment Day: 1st, AM 3/10/2018  INPATIENT: Hospital Day: 4  Payor: Fang Santiago / Plan: [facility] / Product Type: PPO /      NAME/AGE/GENDER: Mariana Theodore is a 59 y.o. male   PRIMARY DIAGNOSIS: Atherosclerosis of native coronary artery of native heart with unstable angina pectoris (HCC) [I25.110]  Acute ST elevation myocardial infarction (STEMI) involving left main coronary artery without development of Q waves (HCC) [I21.01]  Nonrheumatic aortic valve stenosis [I35.0] Status post coronary artery bypass graft Status post coronary artery bypass graft  Procedure(s) (LRB):  CORONARY ARTERY BYPASS GRAFT CABG X   .  LIMA with AVR (N/A)  JUAN/ANES PROBE  DR ANTON FOR ANES   (N/A)  VEIN HARVEST, GREATER SAPHENOUS (Left)  3 Days Post-Op  ICD-10: Treatment Diagnosis:   · Generalized Muscle Weakness (M62.81)  · Difficulty in walking, Not elsewhere classified (R26.2)   Precaution/Allergies:  Adhesive      ASSESSMENT:     Mr. Kiara Vega Presents sitting in chair and agreeable to treatment. He maintained his ambulation distance this treatment. Patient ambulated 250 feet with SBA and was able to go up and down 6 stairs with step to gait and MD observing this part of the patients treatment. Patient back to room and perform sitting and standing exercises as noted below and reviewed HEP and patient was given a written handout. Patient tolerated exercises well with good understanding. Patient stated he hopes to be discharged from hospital tomorrow. Encouraged patient to ambulate with family. Patient performing ambulation well and is walking with wife during the day. Will discharge patient from PT today with patient to continue with ambulation and HEP. This section established at most recent assessment   PROBLEM LIST (Impairments causing functional limitations):  1. Decreased Strength  2. Decreased Ambulation Ability/Technique  3. Decreased Balance  4. Increased Pain  5. Increased Fatigue  6. Decreased Knowledge of Precautions  7. Decreased [place] with Home Exercise Program   INTERVENTIONS PLANNED: (Benefits and precautions of physical therapy have been discussed with the patient.)  1. Balance Exercise  2. Bed Mobility  3. Therapeutic Activites  4. Therapeutic Exercise/Strengthening  5. Transfer Training  6. Group Therapy     TREATMENT PLAN: Frequency/Duration: twice daily for duration of hospital stay  Rehabilitation Potential For Stated Goals: Excellent     RECOMMENDED REHABILITATION/EQUIPMENT: (at time of discharge pending progress): Due to the probability of continued deficits (see above) this patient will likely need continued skilled physical therapy after discharge.   Equipment:    None at this time              HISTORY:   History of Present Injury/Illness (Reason for Referral):  Patient is status post above  Past Medical History/Comorbidities:   Mr. Abhilash Eaton  has a past medical history of Acute diastolic CHF (congestive heart failure) ([facility] [address] (1/19/2018); Adverse effect of anesthesia (2003 in [employer]); Asymptomatic carotid artery stenosis, right (3/8/2018); Claudication of left lower extremity ([facility] [address] (1/19/2018); Coronary artery disease involving native coronary artery of native heart (01/18/2018); Dyslipidemia (01/18/2018); Essential hypertension (01/18/2018); Family history of premature CAD (1/19/2018); Ill-defined condition; Snores; STEMI involving left anterior descending coronary artery ([facility] [address] (1/18/2018); and Tobacco abuse (1/18/2018). He also has no past medical history of Difficult intubation; Malignant hyperthermia due to anesthesia; Nausea & vomiting; or Pseudocholinesterase deficiency. Mr. Shanhti Hopper  has a past surgical history that includes pr cardiac surg procedure unlist (01/18/2018) and hx orthopaedic (Right, 01/2003). Social History/Living Environment:   Home Environment: Private residence  # Steps to Enter: 5  One/Two Story Residence: One story  Living Alone: No  Support Systems: Spouse/Significant Other/Partner  Patient Expects to be Discharged to[de-identified] Private residence  Current DME Used/Available at Home: None  Prior Level of Function/Work/Activity:  Patient lives with spouse in one story home. Patient was independent and working prior to MI. Patient was active.   Personal Factors:          Overall Behavior:  motivated   Number of Personal Factors/Comorbidities that affect the Plan of Care: 0: LOW COMPLEXITY   EXAMINATION:   Most Recent Physical Functioning:   Gross Assessment:                  Posture:     Balance:  Sitting: Intact  Standing: Intact  Standing - Static: Good  Standing - Dynamic : Good Bed Mobility:     Wheelchair Mobility:     Transfers:  Sit to Stand: Independent  Stand to Sit: Independent  Gait:     Base of Support: Widened  Speed/Dixie: Pace decreased (<100 feet/min)  Step Length: Left shortened;Right shortened  Distance (ft):  (250)  Ambulation - Level of Assistance: Supervision      Body Structures Involved:  1. Heart  2. Lungs Body Functions Affected:  1. Cardio  2. Movement Related Activities and Participation Affected:  1. General Tasks and Demands  2. Mobility  3. Domestic Life   Number of elements that affect the Plan of Care: 4+: HIGH COMPLEXITY   CLINICAL PRESENTATION:   Presentation: Stable and uncomplicated: LOW COMPLEXITY   CLINICAL DECISION MAKIN[address] [place] [address] AM-PAC 6 Clicks   Basic Mobility Inpatient Short Form  How much difficulty does the patient currently have. .. Unable A Lot A Little None   1. Turning over in bed (including adjusting bedclothes, sheets and blankets)? [] 1   [] 2   [x] 3   [] 4   2. Sitting down on and standing up from a chair with arms ( e.g., wheelchair, bedside commode, etc.)   [] 1   [] 2   [] 3   [x] 4   3. Moving from lying on back to sitting on the side of the bed? [] 1   [] 2   [x] 3   [] 4   How much help from another person does the patient currently need. .. Total A Lot A Little None   4. Moving to and from a bed to a chair (including a wheelchair)? [] 1   [] 2   [] 3   [x] 4   5. Need to walk in hospital room? [] 1   [] 2   [] 3   [x] 4   6. Climbing 3-5 steps with a railing? [] 1   [] 2   [] 3   [x] 4   © , Trustees of [address] [place] [address], under license to [employer]. All rights reserved      Score:  Initial: 22 Most Recent: X (Date: -- )    Interpretation of Tool:  Represents activities that are increasingly more difficult (i.e. Bed mobility, Transfers, Gait). Score 24 23 22-20 19-15 14-10 9-7 6     Modifier CH CI CJ CK CL CM CN      ?  Mobility - Walking and Moving Around:     - CURRENT STATUS: CJ - 20%-39% impaired, limited or restricted    - GOAL STATUS: CI - 1%-19% impaired, limited or restricted    - D/C STATUS:  ---------------To be determined---------------  Payor: BLUE CROSS / Plan: [place]OSS [address] / Product Type: PPO /      Medical Necessity:     · Patient demonstrates excellent rehab potential due to higher previous functional level. · Skilled intervention continues to be required due to decline in overall functional mobility. Reason for Services/Other Comments:  · Patient continues to require present interventions due to patient's inability to perform functional mobility at previous independence level. Use of outcome tool(s) and clinical judgement create a POC that gives a: Clear prediction of patient's progress: LOW COMPLEXITY            TREATMENT:   (In addition to Assessment/Re-Assessment sessions the following treatments were rendered)   Pre-treatment Symptoms/Complaints:  \"I had just settled in for my long pina night. \"  Pain: Initial:      Post Session:  No pain complaints noted     Therapeutic Activity: (    15 MInutes): Therapeutic activities including Bed transfers and Ambulation on level ground to improve mobility and activity tolerance. Required minimal   to promote dynamic balance in standing. Therapeutic Exercise: ( 15 minutes):  Exercises per grid below to improve mobility, strength, balance and coordination. Required minimal verbal cues to promote proper body alignment, promote proper body posture and promote proper body breathing techniques. Progressed resistance, range, repetitions and complexity of movement as indicated.      Date:  03/10/18 Date:   Date:     Activity/Exercise Parameters Parameters Parameters   LAQ X 10 reps AB     Seated marching X 10 reps AB     Seated heel and toe raises 2 x 10 reps AB     Glut sets 10 x 5 second hold      Shoulder shrugs X 10 reps     Standing hip abduction X 10 reps   AB     Standing hip extension X 10 reps AB     Mini squats X 10 reps                             Braces/Orthotics/Lines/Etc:   · O2 Device: Room air  Treatment/Session Assessment:    · Response to Treatment:  Tolerated well, no SOB noted  · Interdisciplinary Collaboration:   o Physical Therapy Assistant  o Registered Nurse  · After treatment position/precautions:   o Up in chair  o Bed/Chair-wheels locked  o Bed in low position  o Call light within reach  o RN notified   · Compliance with Program/Exercises: compliant all of the time. · Recommendations/Intent for next treatment session:  Patient progressed well and will discharge from PT at this time with patient to continue with HEP and ambulation.   Total Treatment Duration:  PT Patient Time In/Time Out  Time In: 0925  Time Out: Ina 38, PTA

## 2018-03-10 NOTE — PROGRESS NOTES
Bedside and Verbal shift change report given to Arlyn Del Real (oncoming nurse) by Gerardo Morales RN (offgoing nurse). Report included the following information SBAR, Kardex, Intake/Output, MAR and Cardiac Rhythm NSR.

## 2018-03-10 NOTE — PROGRESS NOTES
CV Progress Note    Admit Date: 3/7/2018    Postop: CABG x 3 /AVR    Subjective:     Patient present conditions:     Review of Systems   Cardiac: regular rhythm  Respiratory: room air, using IS  Neuro: moving all extremities   Incision: dressings Dry and healing  GI: tolerating po   Chest Tubes: out  TPW: out    Objective:     Vitals:  Blood pressure 142/63, pulse 87, temperature 98.9 °F (37.2 °C), resp. rate 18, height 5' 10\" (1.778 m), weight 223 lb 6.4 oz (101.3 kg), SpO2 92 %.   Vitals:    03/09/18 2303 03/10/18 0244 03/10/18 0730 03/10/18 0733   BP: 126/79 148/66 142/63 142/63   Pulse: 97 98 90 87   Resp: 16 18 18    Temp: (!) 100.5 °F (38.1 °C) 98.2 °F (36.8 °C) 98.9 °F (37.2 °C)    SpO2: 94% 92% 92% 92%   Weight:  223 lb 6.4 oz (101.3 kg)     Height:            I/O:  03/10 0701 - 03/10 1900  In: 250 [P.O.:250]  Out: -   03/08 1901 - 03/10 0700  In: 1380 [P.O.:1380]  Out: 900 [Urine:900]  Last 3 Recorded Weights in this Encounter    03/08/18 0606 03/08/18 2330 03/10/18 0244   Weight: 219 lb 2.2 oz (99.4 kg) 220 lb 8 oz (100 kg) 223 lb 6.4 oz (101.3 kg)       Intake/Output Summary (Last 24 hours) at 03/10/18 1026  Last data filed at 03/10/18 0855   Gross per 24 hour   Intake              890 ml   Output              600 ml   Net              290 ml           Heart: RRR, slightly tachy  Lung: stable on RA, using IS  Neuro: grossly intact, conversant  Incisions: healing, dry  Chest Tubes: out  TPW; out    ECG/Telemetry:     Lab/Data Review:  Recent Results (from the past 12 hour(s))   MAGNESIUM    Collection Time: 03/10/18  4:00 AM   Result Value Ref Range    Magnesium 2.1 1.8 - 2.4 mg/dL   POTASSIUM    Collection Time: 03/10/18  4:00 AM   Result Value Ref Range    Potassium 3.8 3.5 - 5.1 mmol/L     CMP:   Lab Results   Component Value Date/Time    K 3.8 03/10/2018 04:00 AM    MG 2.1 03/10/2018 04:00 AM     CBC:   No results found for: WBC, HGB, HGBEXT, HCT, HCTEXT, PLT, PLTEXT, HGBEXT, HCTEXT, PLTEXT, HGBEXT, HCTEXT, PLTEXT    Liver Panel: No results found for: ALB, CBIL, TBIL, TP, GLOB, AGRAT, SGOT, ASTPOC, ALTPOC, ALT, GPT, AP     Radiology:     Assessment:     Stable POD 3 CABG x 3/AVR.        Plan/Recommendations/Medical Decision Making:     Tachycardia- tolerating coreg 25 mg - monitor rate  Off oxygen, stable- encourage IS  EF 50-55% 3/2/18 on pre op Echo done at [facility]- no life vest for dc        See orders    Addy Plater, SHIRLEY  3/10/2018

## 2018-03-10 NOTE — PROGRESS NOTES
CV Progress Note    Subjective: Incisional pain:  minimal  Appetite:  good   Activity: Ambulating well      Objective:     Vitals:  Blood pressure 142/63, pulse 87, temperature 98.9 °F (37.2 °C), resp. rate 18, height 5' 10\" (1.778 m), weight 223 lb 6.4 oz (101.3 kg), SpO2 92 %. Temp (24hrs), Av.9 °F (37.2 °C), Min:97.7 °F (36.5 °C), Max:100.5 °F (38.1 °C)        Plan/Recommendations/Medical Decision Making:     Principal Problem:    Status post coronary artery bypass graft (3/7/2018)    Active Problems:    STEMI involving left anterior descending coronary artery ([address] (2018)      Overview: anterior STEMI 18 (less than 7 weeks prior to admission)      Coronary artery disease involving native coronary artery of native heart (2018)      Overview: 3/7/18 (Dr Nicolle Silveira) CORONARY ARTERY BYPASS GRAFT CABG X 3. LIMA to the LAD,       SVG to the L PDA and SVG to the ramus,  AVR using 23 mm Davidson bovine       pericardial valve      VEIN HARVEST, GREATER SAPHENOUS      Essential hypertension (2018)      Tobacco abuse (2018)      Nonrheumatic aortic valve stenosis (2018)      S/P AVR (aortic valve replacement) (3/7/2018)      Postoperative anemia due to acute blood loss (3/8/2018)      Myocardial infarction of anterior wall ([address] (3/8/2018)      Thrombocytopenia due to extra corporeal by-pass circulation (3/8/2018)      Asymptomatic carotid artery stenosis, right (3/8/2018)      Atelectasis (3/9/2018)      Pneumothorax, right (3/9/2018)        Continue present treatment    See orders for details. Herbert Figueroa.  Nicolle Silveira MD

## 2018-03-10 NOTE — PROGRESS NOTES
Marija Maldonado  Admission Date: 3/7/2018             Daily Progress Note: 3/10/2018  The patient's chart is reviewed and the patient is discussed with the staff.     60 yo male with a history of tobacco abuse, HTN, HL, CAD, and AV Stensosis. Admitted 1/17/18 with STEMI. [facility] with severe MVCAD and EF 40% - cho on admission showed EF 30 % to 35 %, akinesis of the apical inferior, apical septal, apical lateral, and apical wall(s), with grade III diastolic dysfunction (reviewed by Dr. Madhu De La Torre). He is now s/P CABG x 3 with AVR. Normal pre-op spirometry. Seen by Vascular Surgery for carotid disease with plans for outpatient follow up.            Subjective:     Sitting up in chair, room air, oxygen saturation 92%,  6189-9495 ml with incentive spirometry, denies shortness of breath, minimal incisional pain. States going home today or tomorrow. Chest x-ray completed.      Current Facility-Administered Medications   Medication Dose Route Frequency    carvedilol (COREG) tablet 25 mg  25 mg Oral BID WITH MEALS    sodium chloride (NS) flush 5-10 mL  5-10 mL IntraVENous Q8H    sodium chloride (NS) flush 5-10 mL  5-10 mL IntraVENous PRN    magnesium hydroxide (MILK OF MAGNESIA) 400 mg/5 mL oral suspension 30 mL  30 mL Oral DAILY PRN    alum-mag hydroxide-simeth (MYLANTA) oral suspension 30 mL  30 mL Oral Q4H PRN    ondansetron (ZOFRAN) injection 4 mg  4 mg IntraVENous Q6H PRN    acetaminophen (TYLENOL) tablet 650 mg  650 mg Oral Q4H PRN    zolpidem (AMBIEN) tablet 5 mg  5 mg Oral QHS PRN    aspirin delayed-release tablet 81 mg  81 mg Oral DAILY    magnesium oxide (MAG-OX) tablet 400 mg  400 mg Oral TID PRN    magnesium oxide (MAG-OX) tablet 400 mg  400 mg Oral QID PRN    potassium chloride (K-DUR, KLOR-CON) SR tablet 20 mEq  20 mEq Oral BID PRN    potassium chloride (K-DUR, KLOR-CON) SR tablet 40 mEq  40 mEq Oral BID PRN    traMADol (ULTRAM) tablet 50 mg  50 mg Oral Q6H PRN    amiodarone (CORDARONE) tablet 200 mg  200 mg Oral Q12H    famotidine (PEPCID) tablet 20 mg  20 mg Oral Q12H    furosemide (LASIX) tablet 40 mg  40 mg Oral DAILY    mupirocin (BACTROBAN) 2 % ointment   Both Nostrils Q12H    potassium chloride (KLOR-CON) tablet 10 mEq  10 mEq Oral DAILY    senna-docusate (PERICOLACE) 8.6-50 mg per tablet 2 Tab  2 Tab Oral QHS    atorvastatin (LIPITOR) tablet 80 mg  80 mg Oral QHS    oxyCODONE-acetaminophen (PERCOCET 10)  mg per tablet 1 Tab  1 Tab Oral Q4H PRN       Review of Systems    Constitutional: negative for fever, chills, sweats  Cardiovascular: negative for chest pain, palpitations, syncope, edema  Gastrointestinal:  negative for dysphagia, reflux, vomiting, diarrhea, abdominal pain, or melena  Neurologic:  negative for focal weakness, numbness, headache    Objective:     Vitals:    03/09/18 2303 03/10/18 0244 03/10/18 0730 03/10/18 0733   BP: 126/79 148/66 142/63 142/63   Pulse: 97 98 90 87   Resp: 16 18 18    Temp: (!) 100.5 °F (38.1 °C) 98.2 °F (36.8 °C) 98.9 °F (37.2 °C)    SpO2: 94% 92% 92% 92%   Weight:  223 lb 6.4 oz (101.3 kg)     Height:         Intake and Output:   03/08 1901 - 03/10 0700  In: 1380 [P.O.:1380]  Out: 900 [Urine:900]       Physical Exam:   Constitution:  the patient is well developed and in no acute distress  EENMT:  Sclera clear, pupils equal, oral mucosa moist  Respiratory: CTA, slightly diminished in bases  Cardiovascular:  RRR without M,G,R  Gastrointestinal: soft and non-tender; with positive bowel sounds. Musculoskeletal: warm without cyanosis. There is  lower leg edema. Skin:  no jaundice or rashes,  Chest wound without signs of infection  Neurologic: no gross neuro deficits     Psychiatric:  alert and oriented x 3  CXR: 3/10/2018     IMPRESSION  IMPRESSION:     1. No pneumothorax is seen on today's study.     2. Left basilar density, likely atelectasis. Possible tiny left effusion.         LAB  Recent Labs      03/09/18   0549  03/08/18 2051 03/08/18   1611  03/08/18   1210  03/08/18   0759   GLUCPOC  141*  156*  128*  100  128*      Recent Labs      03/09/18   0431  03/08/18   0306  03/07/18   2320  03/07/18   1924  03/07/18   1529   WBC  11.5*  8.5   --    --   13.6*   HGB  8.1*  8.4*  8.7*  9.6*  10.6*   HCT  24.9*  26.0*  26.5*  29.0*  32.2*   PLT  126*  130*   --    --   163   INR   --    --    --   1.6  1.7     Recent Labs      03/10/18   0400  03/09/18   0431  03/08/18   0306   03/07/18   1529   NA   --   140  146*   --   148*   K  3.8  4.1  4.4   < >  4.5   CL   --   107  116*   --   117*   CO2   --   26  24   --   25   GLU   --   128*  104*   --   94   BUN   --   16  12   --   14   CREA   --   0.78*  0.78*   --   0.77*   MG  2.1  2.5*  2.6*   < >  3.4*   CA   --   7.5*  7.1*   --   7.2*    < > = values in this interval not displayed. Recent Labs      03/07/18   1505   PH  7.33*   PCO2  46*   PO2  115*   HCO3  24     No results for input(s): LCAD, LAC in the last 72 hours.       Assessment:  (Medical Decision Making)     Hospital Problems  Date Reviewed: 3/9/2018          Codes Class Noted POA    Atelectasis ICD-10-CM: J98.11  ICD-9-CM: 518.0  3/9/2018 Unknown        Pneumothorax, right ICD-10-CM: J93.9  ICD-9-CM: 512.89  3/9/2018 Unknown        Postoperative anemia due to acute blood loss ICD-10-CM: D62  ICD-9-CM: 285.1  3/8/2018 No        Myocardial infarction of anterior wall (HCC) ICD-10-CM: I21.09  ICD-9-CM: 410.10  3/8/2018 Yes        Thrombocytopenia due to extra corporeal by-pass circulation ICD-10-CM: D69.59  ICD-9-CM: 287.49  3/8/2018 No        Asymptomatic carotid artery stenosis, right (Chronic) ICD-10-CM: I65.21  ICD-9-CM: 433.10  3/8/2018 Yes        * (Principal)Status post coronary artery bypass graft ICD-10-CM: Z95.1  ICD-9-CM: V45.81  3/7/2018 Yes        S/P AVR (aortic valve replacement) ICD-10-CM: Z95.2  ICD-9-CM: V43.3  3/7/2018 Yes        Nonrheumatic aortic valve stenosis ICD-10-CM: I35.0  ICD-9-CM: 424.1 1/20/2018 Yes        STEMI involving left anterior descending coronary artery ([facility] [address] ICD-10-CM: I21.02  ICD-9-CM: 410.10  1/18/2018 Yes    Overview Signed 3/8/2018  9:06 AM by Addy Don NP     anterior STEMI 1/19/18 (less than 7 weeks prior to admission)             Coronary artery disease involving native coronary artery of native heart (Chronic) ICD-10-CM: I25.10  ICD-9-CM: 414.01  1/18/2018 Yes    Overview Addendum 3/8/2018  8:53 AM by Addy Don NP     3/7/18 (Dr Leighton De Leon) CORONARY ARTERY BYPASS GRAFT CABG X 3. LIMA to the LAD, SVG to the L PDA and SVG to the ramus,  AVR using 23 mm Davidson bovine pericardial valve  VEIN HARVEST, GREATER SAPHENOUS             Essential hypertension (Chronic) ICD-10-CM: I10  ICD-9-CM: 401.9  1/18/2018 Yes        Tobacco abuse (Chronic) ICD-10-CM: Z72.0  ICD-9-CM: 305.1  1/18/2018 Yes              Plan:  (Medical Decision Making)     Encouraged continued use with incentive spirometry  Consider reevaluation of anemia  MD review chest x-ray    More than 50% of the time documented was spent in face-to-face contact with the patient and in the care of the patient on the floor/unit where the patient is located. Elisa Mayer NP        Lungs:  CTA B, no w/r/r  Heart:  RRR with no Murmur/Rubs/Gallops    Additional Comments:    Very small effusion on left vs atelectasis. No intervention needed. No signs of PTX. On RA. Pulmonary will sign off but will be available should new issues arise. I have spoken with and examined the patient. I agree with the above assessment and plan as documented.     Khadra Cuello MD

## 2018-03-11 VITALS
SYSTOLIC BLOOD PRESSURE: 145 MMHG | TEMPERATURE: 98.7 F | OXYGEN SATURATION: 95 % | RESPIRATION RATE: 18 BRPM | WEIGHT: 223.2 LBS | DIASTOLIC BLOOD PRESSURE: 68 MMHG | HEIGHT: 70 IN | BODY MASS INDEX: 31.95 KG/M2 | HEART RATE: 73 BPM

## 2018-03-11 LAB
MAGNESIUM SERPL-MCNC: 2.5 MG/DL (ref 1.8–2.4)
POTASSIUM SERPL-SCNC: 3.9 MMOL/L (ref 3.5–5.1)

## 2018-03-11 PROCEDURE — 5A1221Z PERFORMANCE OF CARDIAC OUTPUT, CONTINUOUS: ICD-10-PCS | Performed by: THORACIC SURGERY (CARDIOTHORACIC VASCULAR SURGERY)

## 2018-03-11 PROCEDURE — 83735 ASSAY OF MAGNESIUM: CPT | Performed by: NURSE PRACTITIONER

## 2018-03-11 PROCEDURE — 02RF08Z REPLACEMENT OF AORTIC VALVE WITH ZOOPLASTIC TISSUE, OPEN APPROACH: ICD-10-PCS | Performed by: THORACIC SURGERY (CARDIOTHORACIC VASCULAR SURGERY)

## 2018-03-11 PROCEDURE — 74011250637 HC RX REV CODE- 250/637: Performed by: THORACIC SURGERY (CARDIOTHORACIC VASCULAR SURGERY)

## 2018-03-11 PROCEDURE — 74011250637 HC RX REV CODE- 250/637: Performed by: NURSE PRACTITIONER

## 2018-03-11 PROCEDURE — 02100Z9 BYPASS CORONARY ARTERY, ONE ARTERY FROM LEFT INTERNAL MAMMARY, OPEN APPROACH: ICD-10-PCS | Performed by: THORACIC SURGERY (CARDIOTHORACIC VASCULAR SURGERY)

## 2018-03-11 PROCEDURE — 36415 COLL VENOUS BLD VENIPUNCTURE: CPT | Performed by: NURSE PRACTITIONER

## 2018-03-11 PROCEDURE — 5A1223Z PERFORMANCE OF CARDIAC PACING, CONTINUOUS: ICD-10-PCS | Performed by: THORACIC SURGERY (CARDIOTHORACIC VASCULAR SURGERY)

## 2018-03-11 PROCEDURE — 84132 ASSAY OF SERUM POTASSIUM: CPT | Performed by: NURSE PRACTITIONER

## 2018-03-11 PROCEDURE — 77030012891

## 2018-03-11 PROCEDURE — 021109W BYPASS CORONARY ARTERY, TWO ARTERIES FROM AORTA WITH AUTOLOGOUS VENOUS TISSUE, OPEN APPROACH: ICD-10-PCS | Performed by: THORACIC SURGERY (CARDIOTHORACIC VASCULAR SURGERY)

## 2018-03-11 PROCEDURE — 06BQ0ZZ EXCISION OF LEFT SAPHENOUS VEIN, OPEN APPROACH: ICD-10-PCS | Performed by: THORACIC SURGERY (CARDIOTHORACIC VASCULAR SURGERY)

## 2018-03-11 RX ORDER — ACETAMINOPHEN 325 MG/1
TABLET ORAL
Qty: 1 TAB | Refills: 0 | Status: SHIPPED
Start: 2018-03-11

## 2018-03-11 RX ORDER — CARVEDILOL 25 MG/1
25 TABLET ORAL 2 TIMES DAILY WITH MEALS
Qty: 60 TAB | Refills: 5 | Status: SHIPPED | OUTPATIENT
Start: 2018-03-11 | End: 2018-09-28 | Stop reason: SDUPTHER

## 2018-03-11 RX ORDER — LISINOPRIL 5 MG/1
5 TABLET ORAL DAILY
Qty: 30 TAB | Refills: 2 | Status: SHIPPED | OUTPATIENT
Start: 2018-03-11 | End: 2018-06-12 | Stop reason: SDUPTHER

## 2018-03-11 RX ORDER — AMOXICILLIN 250 MG
CAPSULE ORAL
Qty: 1 TAB | Refills: 0 | Status: SHIPPED
Start: 2018-03-11 | End: 2019-06-05

## 2018-03-11 RX ORDER — LISINOPRIL 5 MG/1
5 TABLET ORAL DAILY
Status: DISCONTINUED | OUTPATIENT
Start: 2018-03-11 | End: 2018-03-11 | Stop reason: HOSPADM

## 2018-03-11 RX ORDER — OXYCODONE AND ACETAMINOPHEN 5; 325 MG/1; MG/1
1 TABLET ORAL
Qty: 30 TAB | Refills: 0 | Status: SHIPPED | OUTPATIENT
Start: 2018-03-11 | End: 2018-03-26

## 2018-03-11 RX ADMIN — OXYCODONE HYDROCHLORIDE AND ACETAMINOPHEN 1 TABLET: 10; 325 TABLET ORAL at 08:31

## 2018-03-11 RX ADMIN — CARVEDILOL 25 MG: 12.5 TABLET, FILM COATED ORAL at 08:30

## 2018-03-11 RX ADMIN — FAMOTIDINE 20 MG: 20 TABLET, FILM COATED ORAL at 08:31

## 2018-03-11 RX ADMIN — AMIODARONE HYDROCHLORIDE 200 MG: 200 TABLET ORAL at 08:31

## 2018-03-11 RX ADMIN — ASPIRIN 81 MG: 81 TABLET, COATED ORAL at 08:31

## 2018-03-11 RX ADMIN — POTASSIUM CHLORIDE 10 MEQ: 10 TABLET, EXTENDED RELEASE ORAL at 08:31

## 2018-03-11 RX ADMIN — FUROSEMIDE 40 MG: 40 TABLET ORAL at 08:31

## 2018-03-11 RX ADMIN — LISINOPRIL 5 MG: 5 TABLET ORAL at 09:49

## 2018-03-11 RX ADMIN — Medication 10 ML: at 05:26

## 2018-03-11 RX ADMIN — MUPIROCIN: 20 OINTMENT TOPICAL at 08:33

## 2018-03-11 NOTE — PROGRESS NOTES
CV Progress Note    Admit Date: 3/7/2018    Postop: CABG x 3 /AVR    Subjective:     Patient present conditions:     Review of Systems   Cardiac: regular rhythm  Respiratory: room air, using IS  Neuro: moving all extremities   Incision: dressings Dry and healing  GI: tolerating po   Chest Tubes: out  TPW: out    Objective:     Vitals:  Blood pressure 145/68, pulse 73, temperature 98.7 °F (37.1 °C), resp. rate 18, height 5' 10\" (1.778 m), weight 223 lb 3.2 oz (101.2 kg), SpO2 95 %.   Vitals:    03/10/18 1856 03/10/18 2215 03/11/18 0426 03/11/18 0712   BP: 145/65 126/62 124/59 145/68   Pulse: 72 79 77 73   Resp: 18 18 18 18   Temp: 97.5 °F (36.4 °C) 98.8 °F (37.1 °C) 99.5 °F (37.5 °C) 98.7 °F (37.1 °C)   SpO2: 97% 97% 92% 95%   Weight:   223 lb 3.2 oz (101.2 kg)    Height:            I/O:     03/09 1901 - 03/11 0700  In: 1370 [P.O.:1370]  Out: 600 [Urine:600]  Last 3 Recorded Weights in this Encounter    03/08/18 2330 03/10/18 0244 03/11/18 0426   Weight: 220 lb 8 oz (100 kg) 223 lb 6.4 oz (101.3 kg) 223 lb 3.2 oz (101.2 kg)       Intake/Output Summary (Last 24 hours) at 03/11/18 0849  Last data filed at 03/11/18 0429   Gross per 24 hour   Intake              970 ml   Output              300 ml   Net              670 ml           Heart: RRR, slightly tachy  Lung: stable on RA, using IS  Neuro: grossly intact, conversant  Incisions: healing, dry  Chest Tubes: out  TPW; out    ECG/Telemetry:     Lab/Data Review:  Recent Results (from the past 12 hour(s))   PLEASE READ & DOCUMENT PPD TEST IN 72 HRS    Collection Time: 03/10/18  9:00 PM   Result Value Ref Range    PPD neg Negative    mm Induration 0 mm   MAGNESIUM    Collection Time: 03/11/18  5:41 AM   Result Value Ref Range    Magnesium 2.5 (H) 1.8 - 2.4 mg/dL   POTASSIUM    Collection Time: 03/11/18  5:41 AM   Result Value Ref Range    Potassium 3.9 3.5 - 5.1 mmol/L     CMP:   Lab Results   Component Value Date/Time    K 3.9 03/11/2018 05:41 AM    MG 2.5 (H) 03/11/2018 05:41 AM     CBC:   No results found for: WBC, HGB, HGBEXT, HCT, HCTEXT, PLT, PLTEXT, HGBEXT, HCTEXT, PLTEXT, HGBEXT, HCTEXT, PLTEXT    Liver Panel: No results found for: ALB, CBIL, TBIL, TP, GLOB, AGRAT, SGOT, ASTPOC, ALTPOC, ALT, GPT, AP     Radiology:     Assessment:     Stable POD 4 CABG x 3/AVR. Plan/Recommendations/Medical Decision Making:     Tachycardia- tolerating coreg 25 mg - monitor rate    EF 50-55% 3/2/18 on pre op Echo done at [facility]- no life vest for dc  Stable for home DC        See orders    Leila Wilburn NP  3/11/2018       3/20/2018     12:23 PM    I have personally seen and examined Savanah Gonzalez with  LAURA Alfonso. I agree and confirm findings in history, physical exam, and assessment/plan as outlined above, except as noted below.     Kurt Mahajna MD

## 2018-03-11 NOTE — DISCHARGE SUMMARY
Physician Discharge Summary      Patient: Maxime Pepper MRN: [id number]  SSN: [id number]    YOB: 1960  Age: 59 y.o.   Sex: male       Admit Date: 3/7/2018    Discharge Date: 3/11/2018      Admitting Physician: Nicol Yousif MD     Discharge Physician: Jono Tiwari NP    Admission Diagnoses: Atherosclerosis of native coronary artery of native heart with unstable angina pectoris [facility]) [I25.110]  Acute ST elevation myocardial infarction (STEMI) involving left main coronary artery without development of Q waves ([facility] [address] [I21.01]  Nonrheumatic aortic valve stenosis [I35.0]    Discharge Diagnoses:   Problem List as of 3/11/2018  Date Reviewed: 3/9/2018          Codes Class Noted - Resolved    Atelectasis ICD-10-CM: J98.11  ICD-9-CM: 518.0  3/9/2018 - Present        Pneumothorax, right ICD-10-CM: J93.9  ICD-9-CM: 512.89  3/9/2018 - Present        Postoperative anemia due to acute blood loss ICD-10-CM: D62  ICD-9-CM: 285.1  3/8/2018 - Present        Myocardial infarction of anterior wall ([facility] [address] ICD-10-CM: I21.09  ICD-9-CM: 410.10  3/8/2018 - Present        Thrombocytopenia due to extra corporeal by-pass circulation ICD-10-CM: D69.59  ICD-9-CM: 287.49  3/8/2018 - Present        Asymptomatic carotid artery stenosis, right (Chronic) ICD-10-CM: J76.86  ICD-9-CM: 433.10  3/8/2018 - Present        * (Principal)Status post coronary artery bypass graft ICD-10-CM: Z95.1  ICD-9-CM: V45.81  3/7/2018 - Present        S/P AVR (aortic valve replacement) ICD-10-CM: Z95.2  ICD-9-CM: V43.3  3/7/2018 - Present        Nonrheumatic aortic valve stenosis ICD-10-CM: I35.0  ICD-9-CM: 424.1  1/20/2018 - Present        Family history of premature CAD (Chronic) ICD-10-CM: Z82.49  ICD-9-CM: V17.3  1/19/2018 - Present        Claudication of left lower extremity (HCC) ICD-10-CM: I73.9  ICD-9-CM: 443.9  1/19/2018 - Present        Acute diastolic CHF (congestive heart failure) (HCC) ICD-10-CM: I50.31  ICD-9-CM: 428.31, 428.0 1/19/2018 - Present        STEMI involving left anterior descending coronary artery (HCC) ICD-10-CM: I21.02  ICD-9-CM: 410.10  1/18/2018 - Present    Overview Signed 3/8/2018  9:06 AM by Mary Lutz NP     anterior STEMI 1/19/18 (less than 7 weeks prior to admission)             Coronary artery disease involving native coronary artery of native heart (Chronic) ICD-10-CM: I25.10  ICD-9-CM: 414.01  1/18/2018 - Present    Overview Addendum 3/8/2018  8:53 AM by Mary Lutz NP     3/7/18 (Dr Lucinda Isaac) CORONARY ARTERY BYPASS GRAFT CABG X 3. LIMA to the LAD, SVG to the L PDA and SVG to the ramus,  AVR using 23 mm Davidson bovine pericardial valve  VEIN HARVEST, GREATER SAPHENOUS             Essential hypertension (Chronic) ICD-10-CM: I10  ICD-9-CM: 401.9  1/18/2018 - Present        Dyslipidemia (Chronic) ICD-10-CM: E78.5  ICD-9-CM: 272.4  1/18/2018 - Present        Tobacco abuse (Chronic) ICD-10-CM: Z72.0  ICD-9-CM: 305.1  1/18/2018 - Present        STEMI (ST elevation myocardial infarction) ([facility] [address] ICD-10-CM: I21.3  ICD-9-CM: 410.90  1/18/2018 - Present        RESOLVED: Encounter for weaning from ventilator ([facility] [address] ICD-10-CM: Z99.11  ICD-9-CM: V46.13  3/7/2018 - 3/9/2018        RESOLVED: Hypoxemia ICD-10-CM: R09.02  ICD-9-CM: 799.02  3/7/2018 - 3/9/2018               Procedures for this admission: Procedure(s):  CORONARY ARTERY BYPASS GRAFT CABG X   . LIMA with AVR  JUAN/ANES PROBE  DR ANTON FOR ANES    VEIN HARVEST, GREATER SAPHENOUS    Discharged Condition: stable    Hospital Course: The patient is a 59year old male with Severe Multivessel Coronary Artery Disease with Acute Diastolic CHF, Non rheumatic AS, STEMI  Newly diagnosed R ICA stenosis 70-99%. He underwent successful CORONARY ARTERY BYPASS GRAFT CABG X 3. LIMA to the LAD, SVG to the L PDA and SVG to the ramus,  AVR using 23 mm Davidson bovine pericardial valve 3/7/18. The patient tolerated the surgery well and transitioned to stepdown POD 1.  His recovery has been very uneventful with him remaining in NSR, afebrile, and hemodynamically stable. Pain well controlled and all incisions healing. He has done well participating in PT and IS. He is stable for discharge today. Just prior to surgery echocardiogram showed marked improvement in his EF (post STEMI) to 50-55% therefore life vest not necessary at discharge/discontinued. Also, Brilinta not resumed due to bypass of BMS with CABG. Consults: Pulmonary/Critical Care    Significant Diagnostic Studies: labs, microbiology, radiology and cardiac graphics: Telemetry and ECG    Treatments: IV hydration, antibiotics: perioperative, analgesia, cardiac meds: lisinopril (Zestril), Coreg, furosemide, amiodarone and potassium , anticoagulation: ASA, insulin: regular, respiratory therapy: O2, therapies: PT and surgery: as above    Discharge Exam: Blood pressure 145/68, pulse 73, temperature 98.7 °F (37.1 °C), resp. rate 18, height 5' 10\" (1.778 m), weight 223 lb 3.2 oz (101.2 kg), SpO2 95 %.          Vitals:     03/10/18 1856 03/10/18 2215 03/11/18 0426 03/11/18 0712   BP: 145/65 126/62 124/59 145/68   Pulse: 72 79 77 73   Resp: 18 18 18 18   Temp: 97.5 °F (36.4 °C) 98.8 °F (37.1 °C) 99.5 °F (37.5 °C) 98.7 °F (37.1 °C)   SpO2: 97% 97% 92% 95%   Weight:     223 lb 3.2 oz (101.2 kg)     Height:                 I/O:  03/09 1901 - 03/11 0700  In: 1370 [P.O.:1370]  Out: 600 [Urine:600]        Last 3 Recorded Weights in this Encounter     03/08/18 2330 03/10/18 0244 03/11/18 0426   Weight: 220 lb 8 oz (100 kg) 223 lb 6.4 oz (101.3 kg) 223 lb 3.2 oz (101.2 kg)         Intake/Output Summary (Last 24 hours) at 03/11/18 0877  Last data filed at 03/11/18 0429    Gross per 24 hour   Intake              970 ml   Output              300 ml   Net              670 ml               Heart: RRR, slightly tachy  Lung: stable on RA, using IS  Neuro: grossly intact, conversant  Incisions: healing, dry  Chest Tubes: out  TPW; out      Disposition: home    Patient Instructions:   Current Discharge Medication List      START taking these medications    Details   acetaminophen (TYLENOL) 325 mg tablet Over the counter medicine, take for pain as on label if needed  Qty: 1 Tab, Refills: 0      senna-docusate (PERICOLACE) 8.6-50 mg per tablet Over the counter colace and senakot - take while on narcotics for constipation  Qty: 1 Tab, Refills: 0      oxyCODONE-acetaminophen (PERCOCET) 5-325 mg per tablet Take 1 Tab by mouth every four (4) hours as needed for Pain. Max Daily Amount: 6 Tabs. Qty: 30 Tab, Refills: 0    Associated Diagnoses: S/P AVR (aortic valve replacement)         CONTINUE these medications which have CHANGED    Details   carvedilol (COREG) 25 mg tablet Take 1 Tab by mouth two (2) times daily (with meals). Qty: 60 Tab, Refills: 5      lisinopril (PRINIVIL, ZESTRIL) 5 mg tablet Take 1 Tab by mouth daily. Qty: 30 Tab, Refills: 2         CONTINUE these medications which have NOT CHANGED    Details   atorvastatin (LIPITOR) 80 mg tablet Take 1 Tab by mouth nightly. Qty: 30 Tab, Refills: 11      aspirin 81 mg chewable tablet Take 325 mg by mouth daily. nitroglycerin (NITROSTAT) 0.4 mg SL tablet 1 Tab by SubLINGual route every five (5) minutes as needed for Chest Pain. Qty: 1 Bottle, Refills: 5         STOP taking these medications       amiodarone (CORDARONE) 200 mg tablet Comments:   Reason for Stopping:         mupirocin calcium (BACTROBAN) 2 % nasal ointment Comments:   Reason for Stopping:         ticagrelor (BRILINTA) 90 mg tablet Comments:   Reason for Stopping:               Reference discharge instructions as provided by nursing for diet, labs, medications, activity, wound care and any outpatient referrals.     Follow-up Appointments   Procedures    FOLLOW UP VISIT Appointment in: [facility] [address] Card[facility]     Standing Status:   Standing     Number of Occurrences:   1     Order Specific Question: Appointment in     Answer: One Week    FOLLOW UP VISIT Appointment in: Other (Specify) Dr Garcia Senior     Dr Radha Carranza     Standing Status:   Standing     Number of Occurrences:   1     Standing Expiration Date:   3/12/2018     Order Specific Question:   Appointment in     Answer: Other (Specify)        Signed:  Etienne Temple NP  3/11/2018  9:04 AM    Elements of this note have been dictated using speech recognition software. As a result, errors of speech recognition may have occurred. 3/20/2018     12:23 PM    I have personally seen and examined Bhakti Villanueva with  LAURA Landaverde. I agree and confirm findings in history, physical exam, and assessment/plan as outlined above, except as noted below.     Yaritza Hernandes MD

## 2018-03-11 NOTE — PROGRESS NOTES
I have reviewed discharge instructions with the patient and spouse. The patient and spouse verbalized understanding. Discharge medications reviewed with patient and spouse and appropriate educational materials and side effects teaching were provided. Required appointments, incision care, and activity restrictions discussed. Escorted to discharge area via wheelchair. Driven home by wife.

## 2018-03-12 ENCOUNTER — HOME CARE VISIT (OUTPATIENT)
Dept: SCHEDULING | Facility: HOME HEALTH | Age: 58
End: 2018-03-12
Payer: COMMERCIAL

## 2018-03-12 VITALS
OXYGEN SATURATION: 97 % | HEART RATE: 76 BPM | DIASTOLIC BLOOD PRESSURE: 57 MMHG | SYSTOLIC BLOOD PRESSURE: 125 MMHG | RESPIRATION RATE: 18 BRPM | TEMPERATURE: 98.2 F

## 2018-03-12 PROCEDURE — G0299 HHS/HOSPICE OF RN EA 15 MIN: HCPCS

## 2018-03-12 PROCEDURE — 400013 HH SOC

## 2018-03-12 NOTE — OP NOTES
[facility] REPORT    London Duvall  MR#: [id number]  : 1960  ACCOUNT #: [de-identified]   DATE OF SERVICE: 2018    PREOPERATIVE DIAGNOSES:    1. Coronary artery occlusive disease. 2.  Aortic valve stenosis. POSTOPERATIVE DIAGNOSES:    1. Coronary artery occlusive disease. 2.  Aortic valve stenosis. 3.  Trileaflet aortic valve. PROCEDURES PERFORMED:  Three-vessel coronary artery bypass grafting using reverse saphenous vein graft to the left posterior descending coronary, reverse saphenous vein graft to the intermediate coronary, and a left internal mammary artery to the left anterior descending coronary artery; aortic valve replacement with a 23 mm Davidson bovine pericardial valve; (arterial line and [place]-Jules catheter placed by anesthesia), temporary right ventricular pacing wires. SURGEON:  Fidel Chung MD     ASSISTANT:       ANESTHESIA:  General.     BYPASS TIME:  196 minutes. AORTIC CROSSCLAMP TIME:  157 minutes. ESTIMATED BLOOD LOSS:  minimal    SPECIMENS REMOVED:       COMPLICATIONS:       IMPLANTS:       PREOPERATIVE HISTORY:  This is a 40-year-old gentleman who was admitted to the hospital with an ST-segment elevation myocardial infarction. He was treated with a bare metal stent to the left anterior descending coronary. However, the patient also had a severe left main coronary disease as well as disease in the circumflex. After 6 weeks antiplatelet therapy for the bare metal stent, the patient was brought back for surgical revascularization. It was also noted the patient had a valve area of 1.7 cm2 and a gradient of 35 mm across the aortic valve and aortic valve replacement was also recommended. THEN WHAT WAS DONE:  The heart was exposed through a median sternotomy. The heart was mildly hypertrophied.   Three coronaries were grafted placing individual vein graft to the intermediate coronary, individual vein graft to the left posterior descending coronary, the internal mammary artery was used to bypass the mid left anterior descending coronary, the aortic valve was trileaflet, but heavily calcified. After excision, it was replaced with a 23 mm Davidson bovine pericardial valve. The patient came off bypass without trouble. PROCEDURE IN DETAIL:  The patient was premedicated and brought to the operating room. The patient was placed supine on the table. Appropriate monitoring lines were placed including [place]-Jules catheter, radial artery catheter. General endotracheal anesthesia was given. Anterior body and both legs then prepped with Betadine. The patient was draped into a sterile field. Saphenous vein was taken from the lower leg. The vein was prepared and the skin of the leg was closed with subcuticular closure technique. The chest was opened in the midline. A sternotomy was carried out. Left pleural cavity was opened widely. Mammary artery was taken down. Next, pericardium was opened vertically and retracted. Heparin 3 mg/kg body weight was given. The patient was then cannulated, placed on bypass and cooled to 32 degrees centigrade. Catheter was placed in the coronary sinus for retrograde cardioplegia and a vent was placed through the right superior pulmonary vein. The aorta was then cross clamped. Blood cardioplegia was given antegrade. The left posterior descending coronary was identified, open for a 5 mm length and reverse vein was fixed to this vessel with a 7-0 Prolene. Next, the intermediate coronary was identified and opened and a second reverse vein was sutured to this coronary with a running 7-0 Prolene. Finally, the internal mammary artery was sutured to the mid LAD with a running 7-0 Prolene. Following this, further cardioplegia was given. Next, aortic root was opened obliquely with good exposure to the aortic valve. The leaflets were described above.   The individual leaflets were excised sharply back to a good pliable annulus. The annulus size was 23 mm. A 23 mm Davidson bovine pericardial valve was washed on the back table and brought to the field. It was sutured in place with interrupted horizontal mattress sutures of 2-0 Ethibond. The sutures were placed so that the pledgets lay on the ventricular side of the annulus and these sutures were then passed through the sewing ring of the aortic valve. The valve seated nicely and all individual sutures were secured with a Cor-Knot device. Saline irrigation was used during the procedure to remove any particle manner. Aortotomy was then closed with running Prolene suture. At this point, after removal of all air, the aortic crossclamp was released and the patient was rewarmed to 37 degrees centigrade. A partial clamp was placed on the ascending aorta and two 4 mm buttons of aortic wall removed. The proximal vein grafts were sutured directly to the aorta with 6-0 Prolene. Patient was then weaned from bypass without difficulty. All blood was then returned to the patient after which cannulas were removed and the pursestring suture tied. Protamine was then given to reverse the heparin. Temporary right ventricular pacing wires were placed; 32 Latvian tubes were left to drain the mediastinum. Hemostasis was satisfactory. Sternum was then approximated with interrupted stainless steel wire. Soft tissues were closed with Vicryl and skin was closed with Vicryl using subcuticular closure technique. The patient tolerated the procedure well and was moved to the intensive care in a stable condition. Sponge, needle, and instrument counts were correct.       MD NANCY Ceballos / [place]  D: 03/11/2018 12:50     T: 03/11/2018 13:54  JOB #: [id number]  CC: Fabiola Stein MD

## 2018-03-13 ENCOUNTER — HOME CARE VISIT (OUTPATIENT)
Dept: SCHEDULING | Facility: HOME HEALTH | Age: 58
End: 2018-03-13
Payer: COMMERCIAL

## 2018-03-13 VITALS
HEART RATE: 86 BPM | RESPIRATION RATE: 18 BRPM | DIASTOLIC BLOOD PRESSURE: 64 MMHG | SYSTOLIC BLOOD PRESSURE: 118 MMHG | TEMPERATURE: 98.9 F | WEIGHT: 215 LBS | HEIGHT: 70 IN | BODY MASS INDEX: 30.78 KG/M2

## 2018-03-13 PROCEDURE — G0151 HHCP-SERV OF PT,EA 15 MIN: HCPCS

## 2018-03-14 ENCOUNTER — HOME CARE VISIT (OUTPATIENT)
Dept: SCHEDULING | Facility: HOME HEALTH | Age: 58
End: 2018-03-14
Payer: COMMERCIAL

## 2018-03-14 VITALS
SYSTOLIC BLOOD PRESSURE: 128 MMHG | DIASTOLIC BLOOD PRESSURE: 78 MMHG | OXYGEN SATURATION: 94 % | HEART RATE: 82 BPM | TEMPERATURE: 97.7 F | RESPIRATION RATE: 18 BRPM

## 2018-03-14 PROCEDURE — G0299 HHS/HOSPICE OF RN EA 15 MIN: HCPCS

## 2018-03-15 ENCOUNTER — TELEPHONE (OUTPATIENT)
Dept: HOME HEALTH SERVICES | Facility: HOME HEALTH | Age: 58
End: 2018-03-15

## 2018-03-15 NOTE — TELEPHONE ENCOUNTER
[address] [place] [address] Suzanna Morley Pharmacist consult. I spoke with Mr. Melendez Mac and explained my part of the [facility] team.  We reviewed his discharge medications. He is only having a little pain, but his incisions are really starting to itch as they heal.  He is sleeping in his bed and sleeping really well. No medication questions at this time. I gave him my cell phone number and asked him to call me with any medication questions or issues.

## 2018-03-16 ENCOUNTER — HOME CARE VISIT (OUTPATIENT)
Dept: SCHEDULING | Facility: HOME HEALTH | Age: 58
End: 2018-03-16
Payer: COMMERCIAL

## 2018-03-16 VITALS
RESPIRATION RATE: 16 BRPM | DIASTOLIC BLOOD PRESSURE: 62 MMHG | HEART RATE: 88 BPM | SYSTOLIC BLOOD PRESSURE: 124 MMHG | TEMPERATURE: 98 F | OXYGEN SATURATION: 98 %

## 2018-03-16 PROCEDURE — G0151 HHCP-SERV OF PT,EA 15 MIN: HCPCS

## 2018-03-16 NOTE — ANESTHESIA POSTPROCEDURE EVALUATION
Post-Anesthesia Evaluation and Assessment    Patient: Kofi White MRN: [id number]  SSN: [id number]    YOB: 1960  Age: 59 y.o. Sex: male       Cardiovascular Function/Vital Signs  Visit Vitals    /68 (BP 1 Location: Right arm, BP Patient Position: Sitting)    Pulse 73    Temp 37.1 °C (98.7 °F)    Resp 18    Ht 5' 10\" (1.778 m)    Wt 101.2 kg (223 lb 3.2 oz)    SpO2 95%    BMI 32.03 kg/m2       Patient is status post general anesthesia for Procedure(s):  CORONARY ARTERY BYPASS GRAFT CABG X   . LIMA with AVR  JUAN/ANES PROBE  DR ANTON FOR ANES    VEIN HARVEST, GREATER SAPHENOUS. Nausea/Vomiting: None    Postoperative hydration reviewed and adequate. Pain:  Pain Scale 1: Numeric (0 - 10) (03/11/18 0920)  Pain Intensity 1: 0 (03/11/18 0920)   Managed    Neurological Status:   Neuro (WDL): Within Defined Limits (03/07/18 0602)  Neuro  Neurologic State: Alert (03/11/18 1732)  Orientation Level: Oriented X4 (03/11/18 4218)  Cognition: Appropriate decision making; Appropriate for age attention/concentration; Appropriate safety awareness (03/11/18 6839)  Speech: Appropriate for age;Clear (03/09/18 1923)  Assessment L Pupil: Brisk;Round (03/08/18 0700)  Size L Pupil (mm): 3 (03/08/18 0700)  Assessment R Pupil: Brisk;Round (03/08/18 0700)  Size R Pupil (mm): 3 (03/08/18 0700)  LUE Motor Response: Purposeful (03/08/18 0700)  LLE Motor Response: Purposeful (03/08/18 0700)  RUE Motor Response: Purposeful (03/08/18 0700)  RLE Motor Response: Purposeful (03/08/18 0700)   At baseline    Mental Status and Level of Consciousness: Arousable    Pulmonary Status:   O2 Device: Room air (03/11/18 3498)   Adequate oxygenation and airway patent    Complications related to anesthesia: None    Post-anesthesia assessment completed.  No concerns    Signed By: Pratik Nair MD     March 16, 2018

## 2018-03-19 ENCOUNTER — HOSPITAL ENCOUNTER (OUTPATIENT)
Dept: CT IMAGING | Age: 58
Discharge: HOME | End: 2018-03-19
Attending: PHYSICIAN ASSISTANT
Payer: COMMERCIAL

## 2018-03-19 DIAGNOSIS — I65.21 CAROTID ARTERY STENOSIS, ASYMPTOMATIC, RIGHT: ICD-10-CM

## 2018-03-19 PROCEDURE — 74011636320 HC RX REV CODE- 636/320: Performed by: PHYSICIAN ASSISTANT

## 2018-03-19 PROCEDURE — 70498 CT ANGIOGRAPHY NECK: CPT

## 2018-03-19 PROCEDURE — 74011000258 HC RX REV CODE- 258: Performed by: PHYSICIAN ASSISTANT

## 2018-03-19 RX ORDER — SODIUM CHLORIDE 0.9 % (FLUSH) 0.9 %
10 SYRINGE (ML) INJECTION
Status: COMPLETED | OUTPATIENT
Start: 2018-03-19 | End: 2018-03-19

## 2018-03-19 RX ADMIN — IOPAMIDOL 80 ML: 755 INJECTION, SOLUTION INTRAVENOUS at 11:21

## 2018-03-19 RX ADMIN — Medication 10 ML: at 11:21

## 2018-03-19 RX ADMIN — SODIUM CHLORIDE 100 ML: 900 INJECTION, SOLUTION INTRAVENOUS at 11:21

## 2018-03-21 ENCOUNTER — HOME CARE VISIT (OUTPATIENT)
Dept: SCHEDULING | Facility: HOME HEALTH | Age: 58
End: 2018-03-21
Payer: COMMERCIAL

## 2018-03-21 VITALS
OXYGEN SATURATION: 98 % | HEART RATE: 95 BPM | RESPIRATION RATE: 18 BRPM | TEMPERATURE: 97.3 F | WEIGHT: 207.5 LBS | DIASTOLIC BLOOD PRESSURE: 72 MMHG | BODY MASS INDEX: 29.77 KG/M2 | SYSTOLIC BLOOD PRESSURE: 140 MMHG

## 2018-03-21 PROCEDURE — G0151 HHCP-SERV OF PT,EA 15 MIN: HCPCS

## 2018-03-21 PROCEDURE — G0299 HHS/HOSPICE OF RN EA 15 MIN: HCPCS

## 2018-03-22 ENCOUNTER — TELEPHONE (OUTPATIENT)
Dept: HOME HEALTH SERVICES | Facility: HOME HEALTH | Age: 58
End: 2018-03-22

## 2018-03-23 ENCOUNTER — HOME CARE VISIT (OUTPATIENT)
Dept: SCHEDULING | Facility: HOME HEALTH | Age: 58
End: 2018-03-23
Payer: COMMERCIAL

## 2018-03-23 VITALS
OXYGEN SATURATION: 98 % | SYSTOLIC BLOOD PRESSURE: 138 MMHG | RESPIRATION RATE: 18 BRPM | DIASTOLIC BLOOD PRESSURE: 72 MMHG | BODY MASS INDEX: 29.63 KG/M2 | WEIGHT: 206.5 LBS | HEART RATE: 77 BPM | TEMPERATURE: 97.5 F

## 2018-03-23 PROCEDURE — G0299 HHS/HOSPICE OF RN EA 15 MIN: HCPCS

## 2018-03-25 VITALS
WEIGHT: 207.5 LBS | BODY MASS INDEX: 29.77 KG/M2 | DIASTOLIC BLOOD PRESSURE: 72 MMHG | SYSTOLIC BLOOD PRESSURE: 140 MMHG | RESPIRATION RATE: 16 BRPM | TEMPERATURE: 97.3 F | HEART RATE: 95 BPM

## 2018-03-27 ENCOUNTER — HOME CARE VISIT (OUTPATIENT)
Dept: SCHEDULING | Facility: HOME HEALTH | Age: 58
End: 2018-03-27
Payer: COMMERCIAL

## 2018-03-27 VITALS
WEIGHT: 203.5 LBS | TEMPERATURE: 97.9 F | RESPIRATION RATE: 18 BRPM | BODY MASS INDEX: 29.2 KG/M2 | OXYGEN SATURATION: 98 % | HEART RATE: 94 BPM | DIASTOLIC BLOOD PRESSURE: 78 MMHG | SYSTOLIC BLOOD PRESSURE: 140 MMHG

## 2018-03-27 PROCEDURE — G0299 HHS/HOSPICE OF RN EA 15 MIN: HCPCS

## 2018-04-04 ENCOUNTER — TELEPHONE (OUTPATIENT)
Dept: HOME HEALTH SERVICES | Facility: HOME HEALTH | Age: 58
End: 2018-04-04

## 2018-04-04 ENCOUNTER — HOME CARE VISIT (OUTPATIENT)
Dept: SCHEDULING | Facility: HOME HEALTH | Age: 58
End: 2018-04-04
Payer: COMMERCIAL

## 2018-04-04 VITALS
BODY MASS INDEX: 28.84 KG/M2 | TEMPERATURE: 97.5 F | OXYGEN SATURATION: 98 % | RESPIRATION RATE: 18 BRPM | DIASTOLIC BLOOD PRESSURE: 72 MMHG | SYSTOLIC BLOOD PRESSURE: 128 MMHG | WEIGHT: 201 LBS | HEART RATE: 103 BPM

## 2018-04-04 PROCEDURE — G0299 HHS/HOSPICE OF RN EA 15 MIN: HCPCS

## 2018-04-05 ENCOUNTER — HOME CARE VISIT (OUTPATIENT)
Dept: HOME HEALTH SERVICES | Facility: HOME HEALTH | Age: 58
End: 2018-04-05
Payer: COMMERCIAL

## 2018-04-09 ENCOUNTER — ANESTHESIA EVENT (OUTPATIENT)
Dept: SURGERY | Age: 58
DRG: 039 | End: 2018-04-09
Payer: COMMERCIAL

## 2018-04-09 ENCOUNTER — HOSPITAL ENCOUNTER (OUTPATIENT)
Dept: SURGERY | Age: 58
Discharge: HOME | End: 2018-04-09
Payer: COMMERCIAL

## 2018-04-09 VITALS
SYSTOLIC BLOOD PRESSURE: 148 MMHG | HEART RATE: 88 BPM | DIASTOLIC BLOOD PRESSURE: 73 MMHG | BODY MASS INDEX: 29.35 KG/M2 | RESPIRATION RATE: 16 BRPM | OXYGEN SATURATION: 98 % | WEIGHT: 205 LBS | HEIGHT: 70 IN | TEMPERATURE: 97.9 F

## 2018-04-09 LAB
ANION GAP SERPL CALC-SCNC: 6 MMOL/L (ref 7–16)
BASOPHILS # BLD: 0.1 K/UL (ref 0–0.2)
BASOPHILS NFR BLD: 1 % (ref 0–2)
BUN SERPL-MCNC: 11 MG/DL (ref 6–23)
CALCIUM SERPL-MCNC: 9 MG/DL (ref 8.3–10.4)
CHLORIDE SERPL-SCNC: 108 MMOL/L (ref 98–107)
CO2 SERPL-SCNC: 27 MMOL/L (ref 21–32)
CREAT SERPL-MCNC: 0.89 MG/DL (ref 0.8–1.5)
DIFFERENTIAL METHOD BLD: ABNORMAL
EOSINOPHIL # BLD: 0.4 K/UL (ref 0–0.8)
EOSINOPHIL NFR BLD: 5 % (ref 0.5–7.8)
ERYTHROCYTE [DISTWIDTH] IN BLOOD BY AUTOMATED COUNT: 14.8 % (ref 11.9–14.6)
GLUCOSE SERPL-MCNC: 111 MG/DL (ref 65–100)
HCT VFR BLD AUTO: 37.3 % (ref 41.1–50.3)
HGB BLD-MCNC: 12 G/DL (ref 13.6–17.2)
IMM GRANULOCYTES # BLD: 0 K/UL (ref 0–0.5)
IMM GRANULOCYTES NFR BLD AUTO: 0 % (ref 0–5)
LYMPHOCYTES # BLD: 2.5 K/UL (ref 0.5–4.6)
LYMPHOCYTES NFR BLD: 32 % (ref 13–44)
MCH RBC QN AUTO: 28.6 PG (ref 26.1–32.9)
MCHC RBC AUTO-ENTMCNC: 32.2 G/DL (ref 31.4–35)
MCV RBC AUTO: 89 FL (ref 79.6–97.8)
MONOCYTES # BLD: 0.6 K/UL (ref 0.1–1.3)
MONOCYTES NFR BLD: 8 % (ref 4–12)
NEUTS SEG # BLD: 4.1 K/UL (ref 1.7–8.2)
NEUTS SEG NFR BLD: 54 % (ref 43–78)
PLATELET # BLD AUTO: 320 K/UL (ref 150–450)
PMV BLD AUTO: 10.3 FL (ref 10.8–14.1)
POTASSIUM SERPL-SCNC: 4.4 MMOL/L (ref 3.5–5.1)
RBC # BLD AUTO: 4.19 M/UL (ref 4.23–5.67)
SODIUM SERPL-SCNC: 141 MMOL/L (ref 136–145)
WBC # BLD AUTO: 7.6 K/UL (ref 4.3–11.1)

## 2018-04-09 PROCEDURE — 80048 BASIC METABOLIC PNL TOTAL CA: CPT | Performed by: ANESTHESIOLOGY

## 2018-04-09 PROCEDURE — 85025 COMPLETE CBC W/AUTO DIFF WBC: CPT | Performed by: ANESTHESIOLOGY

## 2018-04-09 NOTE — PERIOP NOTES
Patient verified name, , and surgery as listed in [facility]. Patient provided medical/health information and PTA medications to the best of their ability. TYPE  CASE:2  Orders per surgeon: Received yesand dated yes. Labs per surgeon:cbc,bmp. Results: -  Labs per anesthesia protocol: type and screen-dos. Results -  EKG  :  3-2017    Patient provided with and instructed on education handouts including Guide to Surgery, blood transfusions, pain management, and hand hygiene for the family and community, and [facility] brochure.     hibiclens and instructions given per hospital policy. Instructed patient to continue previous medications as prescribed prior to surgery unless otherwise directed and to take the following medications the day of surgery according to anesthesia guidelines : tylenol as needed,aspirin,carvedilol,sertraline . Instructed patient to hold  the following medications: none. Original medication prescription bottles all visualized during patient appointment. Patient teach back successful and patient demonstrates knowledge of instruction.

## 2018-04-09 NOTE — ANESTHESIA PREPROCEDURE EVALUATION
Anesthetic History               Review of Systems / Medical History      Pulmonary          Smoker (Stopped 1/18)         Neuro/Psych              Cardiovascular    Hypertension: poorly controlled          Past MI (1/2018), CAD and CABG  Pertinent negatives: No angina  Exercise tolerance: >4 METS  Comments: EF 55% post-CABG   GI/Hepatic/Renal                Endo/Other             Other Findings            Physical Exam    Airway  Mallampati: II  TM Distance: > 6 cm  Neck ROM: normal range of motion   Mouth opening: Normal     Cardiovascular  Regular rate and rhythm,  S1 and S2 normal,  no murmur, click, rub, or gallop             Dental  No notable dental hx       Pulmonary  Breath sounds clear to auscultation               Abdominal         Other Findings            Anesthetic Plan    ASA: 3  Anesthesia type: general    Monitoring Plan: Arterial line        Anesthetic plan and risks discussed with: Patient and Spouse

## 2018-04-10 ENCOUNTER — HOSPITAL ENCOUNTER (OUTPATIENT)
Dept: CARDIAC REHAB | Age: 58
Discharge: HOME | End: 2018-04-10
Attending: NURSE PRACTITIONER

## 2018-04-10 NOTE — CARDIO/PULMONARY
Dear Dr. Breana Samuels:    Your patient, Donald Ahmadi (1960), has taken the [facility] Depression Scale as part of his admission to the Cardiac Rehab program.  The results of this test indicate that he/she may be experiencing stress and/or depression. Thank you for your support and attention to this matter.

## 2018-04-10 NOTE — CARDIO/PULMONARY
Dear Dr. Ciro Sifuentes:    Thank you for referring your patient, Jenifer Christensen (1960), to the Cardiac Rehabilitation Program at [address] [facility]. Mr. Noman Mcfarlane is a good candidate for the Rehab Program and should see improvements with regular participation. We will be addressing appropriate interventions for modifiable risk factors with your patient during the next 12 weeks. We will contact you with any issues or concerns that may arise, or you can follow your patients progress through [address] [place] [address] at any time. A final summary will be sent to you when the program is completed. Again, thank you for your referral. If we can be of further assistance, please feel free to contact the Cardiopulmonary Rehab staff at [phone].

## 2018-04-15 RX ORDER — SODIUM CHLORIDE, SODIUM LACTATE, POTASSIUM CHLORIDE, CALCIUM CHLORIDE 600; 310; 30; 20 MG/100ML; MG/100ML; MG/100ML; MG/100ML
75 INJECTION, SOLUTION INTRAVENOUS CONTINUOUS
Status: CANCELLED | OUTPATIENT
Start: 2018-04-15

## 2018-04-15 RX ORDER — OXYCODONE HYDROCHLORIDE 5 MG/1
5 TABLET ORAL
Status: CANCELLED | OUTPATIENT
Start: 2018-04-15

## 2018-04-15 RX ORDER — FLUMAZENIL 0.1 MG/ML
0.2 INJECTION INTRAVENOUS AS NEEDED
Status: CANCELLED | OUTPATIENT
Start: 2018-04-15

## 2018-04-15 RX ORDER — OXYCODONE HYDROCHLORIDE 5 MG/1
10 TABLET ORAL
Status: CANCELLED | OUTPATIENT
Start: 2018-04-15

## 2018-04-15 RX ORDER — HYDROMORPHONE HYDROCHLORIDE 2 MG/ML
0.5 INJECTION, SOLUTION INTRAMUSCULAR; INTRAVENOUS; SUBCUTANEOUS
Status: CANCELLED | OUTPATIENT
Start: 2018-04-15

## 2018-04-15 RX ORDER — NALOXONE HYDROCHLORIDE 0.4 MG/ML
0.1 INJECTION, SOLUTION INTRAMUSCULAR; INTRAVENOUS; SUBCUTANEOUS
Status: CANCELLED | OUTPATIENT
Start: 2018-04-15

## 2018-04-15 RX ORDER — DIPHENHYDRAMINE HYDROCHLORIDE 50 MG/ML
12.5 INJECTION, SOLUTION INTRAMUSCULAR; INTRAVENOUS
Status: CANCELLED | OUTPATIENT
Start: 2018-04-15

## 2018-04-16 ENCOUNTER — HOSPITAL ENCOUNTER (INPATIENT)
Age: 58
LOS: 1 days | Discharge: HOME HEALTH | DRG: 039 | End: 2018-04-17
Attending: SURGERY | Admitting: SURGERY
Payer: COMMERCIAL

## 2018-04-16 ENCOUNTER — ANESTHESIA (OUTPATIENT)
Dept: SURGERY | Age: 58
DRG: 039 | End: 2018-04-16
Payer: COMMERCIAL

## 2018-04-16 ENCOUNTER — HOME CARE VISIT (OUTPATIENT)
Dept: HOME HEALTH SERVICES | Facility: HOME HEALTH | Age: 58
End: 2018-04-16
Payer: COMMERCIAL

## 2018-04-16 DIAGNOSIS — Z98.890 S/P CAROTID ENDARTERECTOMY: Primary | ICD-10-CM

## 2018-04-16 PROBLEM — I65.21 CAROTID STENOSIS, RIGHT: Status: ACTIVE | Noted: 2018-04-16

## 2018-04-16 LAB
ABO + RH BLD: NORMAL
BLOOD GROUP ANTIBODIES SERPL: NORMAL
SPECIMEN EXP DATE BLD: NORMAL

## 2018-04-16 PROCEDURE — 74011250637 HC RX REV CODE- 250/637: Performed by: SURGERY

## 2018-04-16 PROCEDURE — 77030018836 HC SOL IRR NACL ICUM -A: Performed by: SURGERY

## 2018-04-16 PROCEDURE — C1768 GRAFT, VASCULAR: HCPCS | Performed by: SURGERY

## 2018-04-16 PROCEDURE — 77030011640 HC PAD GRND REM COVD -A: Performed by: SURGERY

## 2018-04-16 PROCEDURE — 77030005401 HC CATH RAD ARRO -A: Performed by: NURSE ANESTHETIST, CERTIFIED REGISTERED

## 2018-04-16 PROCEDURE — 77030002519 HC INSRT CLMP FIBRA STLTH AMR -B: Performed by: SURGERY

## 2018-04-16 PROCEDURE — 74011250636 HC RX REV CODE- 250/636: Performed by: SURGERY

## 2018-04-16 PROCEDURE — 77030039266 HC ADH SKN EXOFIN S2SG -A: Performed by: SURGERY

## 2018-04-16 PROCEDURE — 74011250637 HC RX REV CODE- 250/637: Performed by: ANESTHESIOLOGY

## 2018-04-16 PROCEDURE — 86900 BLOOD TYPING SEROLOGIC ABO: CPT | Performed by: SURGERY

## 2018-04-16 PROCEDURE — 77010033678 HC OXYGEN DAILY

## 2018-04-16 PROCEDURE — 65610000006 HC RM INTENSIVE CARE

## 2018-04-16 PROCEDURE — 77030002986 HC SUT PROL J&J -A: Performed by: SURGERY

## 2018-04-16 PROCEDURE — 77030013794 HC KT TRNSDUC BLD EDWD -B: Performed by: NURSE ANESTHETIST, CERTIFIED REGISTERED

## 2018-04-16 PROCEDURE — 36600 WITHDRAWAL OF ARTERIAL BLOOD: CPT

## 2018-04-16 PROCEDURE — 77030018824 HC SHNT CAR ARGY COVD -B: Performed by: SURGERY

## 2018-04-16 PROCEDURE — 76010000172 HC OR TIME 2.5 TO 3 HR INTENSV-TIER 1: Performed by: SURGERY

## 2018-04-16 PROCEDURE — 03UH0KZ SUPPLEMENT RIGHT COMMON CAROTID ARTERY WITH NONAUTOLOGOUS TISSUE SUBSTITUTE, OPEN APPROACH: ICD-10-PCS | Performed by: SURGERY

## 2018-04-16 PROCEDURE — 77030008703 HC TU ET UNCUF COVD -A: Performed by: NURSE ANESTHETIST, CERTIFIED REGISTERED

## 2018-04-16 PROCEDURE — 74011250636 HC RX REV CODE- 250/636: Performed by: ANESTHESIOLOGY

## 2018-04-16 PROCEDURE — 74011250636 HC RX REV CODE- 250/636

## 2018-04-16 PROCEDURE — 77030012406 HC DRN WND PENRS BARD -A: Performed by: SURGERY

## 2018-04-16 PROCEDURE — 77030032490 HC SLV COMPR SCD KNE COVD -B: Performed by: SURGERY

## 2018-04-16 PROCEDURE — 77030010512 HC APPL CLP LIG J&J -C: Performed by: SURGERY

## 2018-04-16 PROCEDURE — 76060000036 HC ANESTHESIA 2.5 TO 3 HR: Performed by: SURGERY

## 2018-04-16 PROCEDURE — 77030013292 HC BOWL MX PRSM J&J -A: Performed by: NURSE ANESTHETIST, CERTIFIED REGISTERED

## 2018-04-16 PROCEDURE — 03CH0ZZ EXTIRPATION OF MATTER FROM RIGHT COMMON CAROTID ARTERY, OPEN APPROACH: ICD-10-PCS | Performed by: SURGERY

## 2018-04-16 PROCEDURE — 77030031139 HC SUT VCRL2 J&J -A: Performed by: SURGERY

## 2018-04-16 PROCEDURE — 77030019908 HC STETH ESOPH SIMS -A: Performed by: NURSE ANESTHETIST, CERTIFIED REGISTERED

## 2018-04-16 PROCEDURE — 77030034888 HC SUT PROL 2 J&J -B: Performed by: SURGERY

## 2018-04-16 PROCEDURE — 77030013567 HC DRN WND RESERV BARD -A: Performed by: SURGERY

## 2018-04-16 PROCEDURE — 77030020782 HC GWN BAIR PAWS FLX 3M -B: Performed by: NURSE ANESTHETIST, CERTIFIED REGISTERED

## 2018-04-16 PROCEDURE — 77030002970 HC SUT PLEDG TELE -A: Performed by: SURGERY

## 2018-04-16 PROCEDURE — 74011000250 HC RX REV CODE- 250

## 2018-04-16 PROCEDURE — 74011000250 HC RX REV CODE- 250: Performed by: SURGERY

## 2018-04-16 PROCEDURE — 77030002996 HC SUT SLK J&J -A: Performed by: SURGERY

## 2018-04-16 PROCEDURE — 03CK0ZZ EXTIRPATION OF MATTER FROM RIGHT INTERNAL CAROTID ARTERY, OPEN APPROACH: ICD-10-PCS | Performed by: SURGERY

## 2018-04-16 PROCEDURE — 03CM0ZZ EXTIRPATION OF MATTER FROM RIGHT EXTERNAL CAROTID ARTERY, OPEN APPROACH: ICD-10-PCS | Performed by: SURGERY

## 2018-04-16 PROCEDURE — 74011000258 HC RX REV CODE- 258: Performed by: SURGERY

## 2018-04-16 PROCEDURE — 77030018673: Performed by: SURGERY

## 2018-04-16 DEVICE — XENOSURE BIOLOGIC PATCH, 0.8CM X 8CM, EIFU
Type: IMPLANTABLE DEVICE | Site: CAROTID | Status: FUNCTIONAL
Brand: XENOSURE BIOLOGIC PATCH

## 2018-04-16 RX ORDER — ASPIRIN 81 MG/1
81 TABLET ORAL DAILY
Status: DISCONTINUED | OUTPATIENT
Start: 2018-04-16 | End: 2018-04-17 | Stop reason: HOSPADM

## 2018-04-16 RX ORDER — LIDOCAINE HYDROCHLORIDE 10 MG/ML
0.1 INJECTION INFILTRATION; PERINEURAL AS NEEDED
Status: DISCONTINUED | OUTPATIENT
Start: 2018-04-16 | End: 2018-04-17 | Stop reason: HOSPADM

## 2018-04-16 RX ORDER — PROTAMINE SULFATE 10 MG/ML
INJECTION, SOLUTION INTRAVENOUS AS NEEDED
Status: DISCONTINUED | OUTPATIENT
Start: 2018-04-16 | End: 2018-04-16 | Stop reason: HOSPADM

## 2018-04-16 RX ORDER — SODIUM CHLORIDE, SODIUM LACTATE, POTASSIUM CHLORIDE, CALCIUM CHLORIDE 600; 310; 30; 20 MG/100ML; MG/100ML; MG/100ML; MG/100ML
75 INJECTION, SOLUTION INTRAVENOUS CONTINUOUS
Status: DISCONTINUED | OUTPATIENT
Start: 2018-04-16 | End: 2018-04-16

## 2018-04-16 RX ORDER — SODIUM CHLORIDE, SODIUM LACTATE, POTASSIUM CHLORIDE, CALCIUM CHLORIDE 600; 310; 30; 20 MG/100ML; MG/100ML; MG/100ML; MG/100ML
INJECTION, SOLUTION INTRAVENOUS
Status: DISCONTINUED | OUTPATIENT
Start: 2018-04-16 | End: 2018-04-16 | Stop reason: HOSPADM

## 2018-04-16 RX ORDER — NICARDIPINE HYDROCHLORIDE 0.1 MG/ML
5-15 INJECTION INTRAVENOUS
Status: DISCONTINUED | OUTPATIENT
Start: 2018-04-16 | End: 2018-04-16 | Stop reason: SDUPTHER

## 2018-04-16 RX ORDER — ONDANSETRON 2 MG/ML
4 INJECTION INTRAMUSCULAR; INTRAVENOUS
Status: DISCONTINUED | OUTPATIENT
Start: 2018-04-16 | End: 2018-04-17 | Stop reason: HOSPADM

## 2018-04-16 RX ORDER — CEFAZOLIN SODIUM/WATER 2 G/20 ML
2 SYRINGE (ML) INTRAVENOUS
Status: COMPLETED | OUTPATIENT
Start: 2018-04-16 | End: 2018-04-16

## 2018-04-16 RX ORDER — BUPIVACAINE HYDROCHLORIDE 2.5 MG/ML
INJECTION, SOLUTION EPIDURAL; INFILTRATION; INTRACAUDAL AS NEEDED
Status: DISCONTINUED | OUTPATIENT
Start: 2018-04-16 | End: 2018-04-16 | Stop reason: HOSPADM

## 2018-04-16 RX ORDER — OXYCODONE AND ACETAMINOPHEN 5; 325 MG/1; MG/1
1 TABLET ORAL
Status: DISCONTINUED | OUTPATIENT
Start: 2018-04-16 | End: 2018-04-17 | Stop reason: HOSPADM

## 2018-04-16 RX ORDER — HEPARIN SODIUM 1000 [USP'U]/ML
INJECTION, SOLUTION INTRAVENOUS; SUBCUTANEOUS AS NEEDED
Status: DISCONTINUED | OUTPATIENT
Start: 2018-04-16 | End: 2018-04-16 | Stop reason: HOSPADM

## 2018-04-16 RX ORDER — EPHEDRINE SULFATE 50 MG/ML
INJECTION, SOLUTION INTRAVENOUS AS NEEDED
Status: DISCONTINUED | OUTPATIENT
Start: 2018-04-16 | End: 2018-04-16 | Stop reason: HOSPADM

## 2018-04-16 RX ORDER — NEOSTIGMINE METHYLSULFATE 1 MG/ML
INJECTION, SOLUTION INTRAVENOUS AS NEEDED
Status: DISCONTINUED | OUTPATIENT
Start: 2018-04-16 | End: 2018-04-16 | Stop reason: HOSPADM

## 2018-04-16 RX ORDER — CARVEDILOL 25 MG/1
25 TABLET ORAL 2 TIMES DAILY WITH MEALS
Status: DISCONTINUED | OUTPATIENT
Start: 2018-04-16 | End: 2018-04-17 | Stop reason: HOSPADM

## 2018-04-16 RX ORDER — ACETAMINOPHEN 500 MG
1000 TABLET ORAL ONCE
Status: COMPLETED | OUTPATIENT
Start: 2018-04-16 | End: 2018-04-16

## 2018-04-16 RX ORDER — DEXTROSE MONOHYDRATE AND SODIUM CHLORIDE 5; .9 G/100ML; G/100ML
50 INJECTION, SOLUTION INTRAVENOUS CONTINUOUS
Status: DISCONTINUED | OUTPATIENT
Start: 2018-04-16 | End: 2018-04-17 | Stop reason: HOSPADM

## 2018-04-16 RX ORDER — ACETAMINOPHEN 325 MG/1
650 TABLET ORAL
Status: DISCONTINUED | OUTPATIENT
Start: 2018-04-16 | End: 2018-04-17 | Stop reason: HOSPADM

## 2018-04-16 RX ORDER — ROCURONIUM BROMIDE 10 MG/ML
INJECTION, SOLUTION INTRAVENOUS AS NEEDED
Status: DISCONTINUED | OUTPATIENT
Start: 2018-04-16 | End: 2018-04-16 | Stop reason: HOSPADM

## 2018-04-16 RX ORDER — DEXAMETHASONE SODIUM PHOSPHATE 100 MG/10ML
INJECTION INTRAMUSCULAR; INTRAVENOUS AS NEEDED
Status: DISCONTINUED | OUTPATIENT
Start: 2018-04-16 | End: 2018-04-16 | Stop reason: HOSPADM

## 2018-04-16 RX ORDER — MIDAZOLAM HYDROCHLORIDE 1 MG/ML
2 INJECTION, SOLUTION INTRAMUSCULAR; INTRAVENOUS
Status: DISCONTINUED | OUTPATIENT
Start: 2018-04-16 | End: 2018-04-17 | Stop reason: HOSPADM

## 2018-04-16 RX ORDER — DOPAMINE HYDROCHLORIDE 320 MG/100ML
5 INJECTION, SOLUTION INTRAVENOUS
Status: DISCONTINUED | OUTPATIENT
Start: 2018-04-16 | End: 2018-04-17 | Stop reason: HOSPADM

## 2018-04-16 RX ORDER — HEPARIN SODIUM 5000 [USP'U]/ML
INJECTION, SOLUTION INTRAVENOUS; SUBCUTANEOUS AS NEEDED
Status: DISCONTINUED | OUTPATIENT
Start: 2018-04-16 | End: 2018-04-16 | Stop reason: HOSPADM

## 2018-04-16 RX ORDER — MORPHINE SULFATE 10 MG/ML
5 INJECTION, SOLUTION INTRAMUSCULAR; INTRAVENOUS
Status: DISCONTINUED | OUTPATIENT
Start: 2018-04-16 | End: 2018-04-17 | Stop reason: HOSPADM

## 2018-04-16 RX ORDER — ONDANSETRON 2 MG/ML
INJECTION INTRAMUSCULAR; INTRAVENOUS AS NEEDED
Status: DISCONTINUED | OUTPATIENT
Start: 2018-04-16 | End: 2018-04-16 | Stop reason: HOSPADM

## 2018-04-16 RX ORDER — PROPOFOL 10 MG/ML
INJECTION, EMULSION INTRAVENOUS AS NEEDED
Status: DISCONTINUED | OUTPATIENT
Start: 2018-04-16 | End: 2018-04-16 | Stop reason: HOSPADM

## 2018-04-16 RX ORDER — MIDAZOLAM HYDROCHLORIDE 1 MG/ML
INJECTION, SOLUTION INTRAMUSCULAR; INTRAVENOUS AS NEEDED
Status: DISCONTINUED | OUTPATIENT
Start: 2018-04-16 | End: 2018-04-16 | Stop reason: HOSPADM

## 2018-04-16 RX ORDER — FENTANYL CITRATE 50 UG/ML
INJECTION, SOLUTION INTRAMUSCULAR; INTRAVENOUS AS NEEDED
Status: DISCONTINUED | OUTPATIENT
Start: 2018-04-16 | End: 2018-04-16 | Stop reason: HOSPADM

## 2018-04-16 RX ORDER — LIDOCAINE HYDROCHLORIDE 20 MG/ML
INJECTION, SOLUTION EPIDURAL; INFILTRATION; INTRACAUDAL; PERINEURAL AS NEEDED
Status: DISCONTINUED | OUTPATIENT
Start: 2018-04-16 | End: 2018-04-16 | Stop reason: HOSPADM

## 2018-04-16 RX ORDER — SODIUM CHLORIDE 0.9 % (FLUSH) 0.9 %
5-10 SYRINGE (ML) INJECTION EVERY 8 HOURS
Status: DISCONTINUED | OUTPATIENT
Start: 2018-04-16 | End: 2018-04-17 | Stop reason: HOSPADM

## 2018-04-16 RX ORDER — LIDOCAINE HYDROCHLORIDE 10 MG/ML
INJECTION INFILTRATION; PERINEURAL AS NEEDED
Status: DISCONTINUED | OUTPATIENT
Start: 2018-04-16 | End: 2018-04-16 | Stop reason: HOSPADM

## 2018-04-16 RX ORDER — GLYCOPYRROLATE 0.2 MG/ML
INJECTION INTRAMUSCULAR; INTRAVENOUS AS NEEDED
Status: DISCONTINUED | OUTPATIENT
Start: 2018-04-16 | End: 2018-04-16 | Stop reason: HOSPADM

## 2018-04-16 RX ORDER — NALOXONE HYDROCHLORIDE 0.4 MG/ML
0.4 INJECTION, SOLUTION INTRAMUSCULAR; INTRAVENOUS; SUBCUTANEOUS AS NEEDED
Status: DISCONTINUED | OUTPATIENT
Start: 2018-04-16 | End: 2018-04-17 | Stop reason: HOSPADM

## 2018-04-16 RX ORDER — CEFAZOLIN SODIUM/WATER 2 G/20 ML
2 SYRINGE (ML) INTRAVENOUS EVERY 8 HOURS
Status: COMPLETED | OUTPATIENT
Start: 2018-04-16 | End: 2018-04-16

## 2018-04-16 RX ORDER — SODIUM CHLORIDE 0.9 % (FLUSH) 0.9 %
5-10 SYRINGE (ML) INJECTION AS NEEDED
Status: DISCONTINUED | OUTPATIENT
Start: 2018-04-16 | End: 2018-04-17 | Stop reason: HOSPADM

## 2018-04-16 RX ADMIN — DEXAMETHASONE SODIUM PHOSPHATE 10 MG: 100 INJECTION INTRAMUSCULAR; INTRAVENOUS at 08:00

## 2018-04-16 RX ADMIN — EPHEDRINE SULFATE 10 MG: 50 INJECTION, SOLUTION INTRAVENOUS at 08:12

## 2018-04-16 RX ADMIN — ROCURONIUM BROMIDE 10 MG: 10 INJECTION, SOLUTION INTRAVENOUS at 08:33

## 2018-04-16 RX ADMIN — PROTAMINE SULFATE 10 MG: 10 INJECTION, SOLUTION INTRAVENOUS at 09:28

## 2018-04-16 RX ADMIN — SODIUM CHLORIDE 5 MG/HR: 900 INJECTION, SOLUTION INTRAVENOUS at 17:16

## 2018-04-16 RX ADMIN — MORPHINE SULFATE 5 MG: 10 INJECTION INTRAMUSCULAR; INTRAVENOUS; SUBCUTANEOUS at 13:39

## 2018-04-16 RX ADMIN — SODIUM CHLORIDE, SODIUM LACTATE, POTASSIUM CHLORIDE, CALCIUM CHLORIDE: 600; 310; 30; 20 INJECTION, SOLUTION INTRAVENOUS at 07:35

## 2018-04-16 RX ADMIN — ROCURONIUM BROMIDE 10 MG: 10 INJECTION, SOLUTION INTRAVENOUS at 08:15

## 2018-04-16 RX ADMIN — EPHEDRINE SULFATE 10 MG: 50 INJECTION, SOLUTION INTRAVENOUS at 08:02

## 2018-04-16 RX ADMIN — HEPARIN SODIUM 8000 UNITS: 1000 INJECTION, SOLUTION INTRAVENOUS; SUBCUTANEOUS at 08:27

## 2018-04-16 RX ADMIN — DEXTROSE MONOHYDRATE AND SODIUM CHLORIDE 50 ML/HR: 5; .9 INJECTION, SOLUTION INTRAVENOUS at 10:28

## 2018-04-16 RX ADMIN — Medication 2 G: at 07:47

## 2018-04-16 RX ADMIN — OXYCODONE HYDROCHLORIDE AND ACETAMINOPHEN 1 TABLET: 5; 325 TABLET ORAL at 22:26

## 2018-04-16 RX ADMIN — DEXTRAN 40 500 ML: 10 INJECTION, SOLUTION INTRAVENOUS at 10:29

## 2018-04-16 RX ADMIN — CARVEDILOL 25 MG: 25 TABLET, FILM COATED ORAL at 18:20

## 2018-04-16 RX ADMIN — PROPOFOL 150 MG: 10 INJECTION, EMULSION INTRAVENOUS at 07:27

## 2018-04-16 RX ADMIN — NEOSTIGMINE METHYLSULFATE 3 MG: 1 INJECTION, SOLUTION INTRAVENOUS at 09:40

## 2018-04-16 RX ADMIN — PROTAMINE SULFATE 10 MG: 10 INJECTION, SOLUTION INTRAVENOUS at 09:24

## 2018-04-16 RX ADMIN — MORPHINE SULFATE 5 MG: 10 INJECTION INTRAMUSCULAR; INTRAVENOUS; SUBCUTANEOUS at 16:13

## 2018-04-16 RX ADMIN — ROCURONIUM BROMIDE 10 MG: 10 INJECTION, SOLUTION INTRAVENOUS at 07:55

## 2018-04-16 RX ADMIN — LIDOCAINE HYDROCHLORIDE 30 MG: 20 INJECTION, SOLUTION EPIDURAL; INFILTRATION; INTRACAUDAL; PERINEURAL at 09:31

## 2018-04-16 RX ADMIN — OXYCODONE HYDROCHLORIDE AND ACETAMINOPHEN 1 TABLET: 5; 325 TABLET ORAL at 10:56

## 2018-04-16 RX ADMIN — OXYCODONE HYDROCHLORIDE AND ACETAMINOPHEN 1 TABLET: 5; 325 TABLET ORAL at 15:11

## 2018-04-16 RX ADMIN — Medication 2 G: at 22:28

## 2018-04-16 RX ADMIN — GLYCOPYRROLATE 0.4 MG: 0.2 INJECTION INTRAMUSCULAR; INTRAVENOUS at 09:40

## 2018-04-16 RX ADMIN — PROPOFOL 30 MG: 10 INJECTION, EMULSION INTRAVENOUS at 08:04

## 2018-04-16 RX ADMIN — Medication 10 ML: at 22:26

## 2018-04-16 RX ADMIN — Medication 2 G: at 15:05

## 2018-04-16 RX ADMIN — SODIUM CHLORIDE, SODIUM LACTATE, POTASSIUM CHLORIDE, AND CALCIUM CHLORIDE 75 ML/HR: 600; 310; 30; 20 INJECTION, SOLUTION INTRAVENOUS at 06:12

## 2018-04-16 RX ADMIN — SODIUM CHLORIDE, SODIUM LACTATE, POTASSIUM CHLORIDE, AND CALCIUM CHLORIDE: 600; 310; 30; 20 INJECTION, SOLUTION INTRAVENOUS at 08:45

## 2018-04-16 RX ADMIN — PROTAMINE SULFATE 10 MG: 10 INJECTION, SOLUTION INTRAVENOUS at 09:27

## 2018-04-16 RX ADMIN — ONDANSETRON 4 MG: 2 INJECTION INTRAMUSCULAR; INTRAVENOUS at 09:30

## 2018-04-16 RX ADMIN — LIDOCAINE HYDROCHLORIDE 70 MG: 20 INJECTION, SOLUTION EPIDURAL; INFILTRATION; INTRACAUDAL; PERINEURAL at 07:27

## 2018-04-16 RX ADMIN — Medication 10 ML: at 16:13

## 2018-04-16 RX ADMIN — PROTAMINE SULFATE 10 MG: 10 INJECTION, SOLUTION INTRAVENOUS at 09:26

## 2018-04-16 RX ADMIN — ROCURONIUM BROMIDE 50 MG: 10 INJECTION, SOLUTION INTRAVENOUS at 07:27

## 2018-04-16 RX ADMIN — ACETAMINOPHEN 1000 MG: 500 TABLET, FILM COATED ORAL at 06:12

## 2018-04-16 RX ADMIN — MIDAZOLAM HYDROCHLORIDE 1 MG: 1 INJECTION, SOLUTION INTRAMUSCULAR; INTRAVENOUS at 07:16

## 2018-04-16 RX ADMIN — FENTANYL CITRATE 100 MCG: 50 INJECTION, SOLUTION INTRAMUSCULAR; INTRAVENOUS at 07:16

## 2018-04-16 RX ADMIN — OXYCODONE HYDROCHLORIDE AND ACETAMINOPHEN 1 TABLET: 5; 325 TABLET ORAL at 18:25

## 2018-04-16 RX ADMIN — ROCURONIUM BROMIDE 10 MG: 10 INJECTION, SOLUTION INTRAVENOUS at 09:08

## 2018-04-16 NOTE — PERIOP NOTES
TRANSFER - OUT REPORT:    Verbal report given to Evelyn Horan RN on Alissa Cohn  being transferred to CVICU for routine progression of care       Report consisted of patients Situation, Background, Assessment and   Recommendations(SBAR). Information from the following report(s) OR Summary was reviewed with the receiving nurse. Lines:   Peripheral IV 04/16/18 Right Wrist (Active)   Site Assessment Clean, dry, & intact 4/16/2018  5:56 AM   Phlebitis Assessment 0 4/16/2018  5:56 AM   Infiltration Assessment 0 4/16/2018  5:56 AM   Dressing Status Clean, dry, & intact 4/16/2018  5:56 AM   Dressing Type Tape;Transparent 4/16/2018  5:56 AM   Hub Color/Line Status Infusing;Patent 4/16/2018  5:56 AM       Peripheral IV 04/16/18 Right Wrist (Active)       Arterial Line 04/16/18 Left Radial artery (Active)        Opportunity for questions and clarification was provided.       Patient transported with:   Monitor  O2 @ 4 liters  Registered Nurse   CRNA

## 2018-04-16 NOTE — PROGRESS NOTES
Dual skin assessment completed with RN. Small area of blanchable redness noted to right buttock,  Allevyn dressing placed. No breakdown noted. Right neck surgical dressing clean, dry, and intact.

## 2018-04-16 NOTE — ANESTHESIA POSTPROCEDURE EVALUATION
Post-Anesthesia Evaluation and Assessment    Patient: Osbaldo Hutchinson MRN: [id number]  SSN: [id number]    YOB: 1960  Age: 59 y.o. Sex: male       Cardiovascular Function/Vital Signs  Visit Vitals    /54    Pulse 72    Temp 36.3 °C (97.4 °F)    Resp 13    Ht 5' 10\" (1.778 m)    Wt 92.3 kg (203 lb 7 oz)    SpO2 98%    BMI 29.19 kg/m2       Patient is status post general anesthesia for Procedure(s):  RIGHT CAROTID ENDARTERECTOMY WITH PATCH ANGIOPLASTY. Nausea/Vomiting: None    Postoperative hydration reviewed and adequate. Pain:  Pain Scale 1: Numeric (0 - 10) (04/16/18 1056)  Pain Intensity 1: 5 (04/16/18 1056)   Managed    Neurological Status:   Neuro (WDL): Within Defined Limits (04/16/18 0540)  Neuro  Neurologic State: Alert (04/16/18 1002)  Orientation Level: Oriented X4 (04/16/18 1002)  Cognition: Appropriate decision making; Appropriate for age attention/concentration (04/16/18 1002)  Speech: Clear (04/16/18 1002)  Assessment L Pupil: Round;Brisk (04/16/18 1100)  Size L Pupil (mm): 5 (04/16/18 1100)  Assessment R Pupil: Brisk;Round (04/16/18 1100)  Size R Pupil (mm): 5 (04/16/18 1100)  LUE Motor Response: Weak;Purposeful;Spontaneous  (04/16/18 1002)  LLE Motor Response: Weak;Purposeful;Spontaneous  (04/16/18 1002)  RUE Motor Response: Spontaneous ; Pharmocologically paralyzed;Weak (04/16/18 1002)  RLE Motor Response: Weak;Spontaneous ; Purposeful (04/16/18 1002)   At baseline    Mental Status and Level of Consciousness: Arousable    Pulmonary Status:   O2 Device: Nasal cannula (04/16/18 1042)   Adequate oxygenation and airway patent    Complications related to anesthesia: None    Post-anesthesia assessment completed.  No concerns    Signed By: Rosales Tellez MD     April 16, 2018

## 2018-04-16 NOTE — ADDENDUM NOTE
Addendum  created 04/16/18 1128 by Kyle Segovia CRNA    Anesthesia Event edited, Procedure Event Log accessed

## 2018-04-16 NOTE — PROGRESS NOTES
Pre op visit and prayer. Patient is calm.     Pedrito Jacobs, staff Ramiro watts [address] [place] [address]  C: [phone]   /   [email or web address]

## 2018-04-16 NOTE — IP AVS SNAPSHOT
[address] 
[phone] Patient: Elizabeth Aden MRN: [id number] CDM:[birth year] A check isac indicates which time of day the medication should be taken. My Medications START taking these medications Instructions Each Dose to Equal  
 Morning Noon Evening Bedtime  
 oxyCODONE-acetaminophen 5-325 mg per tablet Commonly known as:  PERCOCET Take 1 Tab by mouth every six (6) hours as needed for Pain. Max Daily Amount: 4 Tabs. 1 Tab CHANGE how you take these medications Instructions Each Dose to Equal  
 Morning Noon Evening Bedtime  
 atorvastatin 80 mg tablet Commonly known as:  LIPITOR What changed:  when to take this Your next dose is: Tonight Take 1 Tab by mouth nightly. 80 mg CONTINUE taking these medications Instructions Each Dose to Equal  
 Morning Noon Evening Bedtime  
 acetaminophen 325 mg tablet Commonly known as:  TYLENOL Over the counter medicine, take for pain as on label if needed  
     
   
   
   
  
 aspirin 81 mg chewable tablet Your next dose is:  tomorrow Take 325 mg by mouth daily. 325 mg  
    
   
   
   
  
 carvedilol 25 mg tablet Commonly known as:  Joshua Christopher Your next dose is:  tonight Take 1 Tab by mouth two (2) times daily (with meals). 25 mg  
    
   
   
   
  
 ferrous sulfate 325 mg (65 mg iron) EC tablet Commonly known as:  IRON Your next dose is:  tonight Take 1 Tab by mouth two (2) times a day. 325 mg  
    
   
   
   
  
 lisinopril 5 mg tablet Commonly known as:  Grady Lindsay Your next dose is:  tomorrow Take 1 Tab by mouth daily. 5 mg  
    
   
   
   
  
 nitroglycerin 0.4 mg SL tablet Commonly known as:  NITROSTAT  
   
 1 Tab by SubLINGual route every five (5) minutes as needed for Chest Pain.   
 0.4 mg  
 senna-docusate 8.6-50 mg per tablet Commonly known as:  Stephanie Pretty Over the counter colace and senakot - take while on narcotics for constipation  
     
   
   
   
  
 sertraline 25 mg tablet Commonly known as:  ZOLOFT Your next dose is:  today Take 1 Tab by mouth daily. 25 mg Where to Get Your Medications Information on where to get these meds will be given to you by the nurse or doctor. ! Ask your nurse or doctor about these medications  
  oxyCODONE-acetaminophen 5-325 mg per tablet

## 2018-04-16 NOTE — ANESTHESIA PROCEDURE NOTES
Arterial Line Placement    Start time: 4/16/2018 7:18 AM  End time: 4/16/2018 7:26 AM  Performed by: Molli Hashimoto  Authorized by: Molli Hashimoto     Pre-Procedure  Indications:  Arterial pressure monitoring and blood sampling  Preanesthetic Checklist: patient identified, risks and benefits discussed, anesthesia consent, site marked, patient being monitored, timeout performed and patient being monitored    Timeout Time: 07:18        Procedure:   Prep:  Chlorhexidine  Seldinger Technique?: Yes    Orientation:  Left  Location:  Radial artery  Catheter size:  20 G  Number of attempts:  2  Cont Cardiac Output Sensor: No      Assessment:   Post-procedure:  Line secured and sterile dressing applied  Patient Tolerance:  Patient tolerated the procedure well with no immediate complications

## 2018-04-16 NOTE — IP AVS SNAPSHOT
[facility] 
 
 
 [address] 
[phone] Patient: Osbaldo Hutchinson MRN: [id number] About your hospitalization You were admitted on:  April 16, 2018 You last received care in the:  [facility] 1 CV INTENSIVE CARE You were discharged on:  April 17, 2018 Why you were hospitalized Your primary diagnosis was:  Carotid Stenosis, Right Your diagnoses also included:  Dyslipidemia, Claudication Of Left Lower Extremity (Hcc), Essential Hypertension Follow-up Information Follow up With Details Comments Contact Info Aidan Alfaro MD   [address] [place] [id number] 
[phone] Mar Amezquita MD Go on 5/17/2018 at 8:30 AM for post-op recheck [address] [facility] [address] 
[phone] Your Scheduled Appointments Thursday April 19, 2018  1:30 PM EDT  
ECHOCARDIOGRAM with ECHO [facility] CARDIOLOGY [place] [facility] ([address] [place] [address]) [address]  
[address] [facility]  
[phone] Tuesday May 01, 2018 11:00 AM EDT INITIAL ASSESSMENT with Ova Bernheim [facility] CARDIOPULM REHAB ([facility]) [address]  
[phone] Thursday May 17, 2018  8:30 AM EDT Carotid Artery Ultrasound with [facility]  
[facility] ([facility]) [address] [id number] [phone] Thursday May 17, 2018  9:15 AM EDT Carotid Artery Ultrasound with MD [facility] ([facility]) [address] [address] [phone] Tuesday June 12, 2018 11:00 AM EDT Office Visit with Leoncio Quick MD  
[facility] ([address] [place] [address]) [address] 
[address] [place] [id number] [phone] Discharge Orders None A check isac indicates which time of day the medication should be taken. My Medications START taking these medications Instructions Each Dose to Equal  
 Morning Noon Evening Bedtime  
 oxyCODONE-acetaminophen 5-325 mg per tablet Commonly known as:  PERCOCET Take 1 Tab by mouth every six (6) hours as needed for Pain. Max Daily Amount: 4 Tabs. 1 Tab CHANGE how you take these medications Instructions Each Dose to Equal  
 Morning Noon Evening Bedtime  
 atorvastatin 80 mg tablet Commonly known as:  LIPITOR What changed:  when to take this Your next dose is: Tonight Take 1 Tab by mouth nightly. 80 mg CONTINUE taking these medications Instructions Each Dose to Equal  
 Morning Noon Evening Bedtime  
 acetaminophen 325 mg tablet Commonly known as:  TYLENOL Over the counter medicine, take for pain as on label if needed  
     
   
   
   
  
 aspirin 81 mg chewable tablet Your next dose is:  tomorrow Take 325 mg by mouth daily. 325 mg  
    
   
   
   
  
 carvedilol 25 mg tablet Commonly known as:  Gypsy Alstrom Your next dose is:  tonight Take 1 Tab by mouth two (2) times daily (with meals). 25 mg  
    
   
   
   
  
 ferrous sulfate 325 mg (65 mg iron) EC tablet Commonly known as:  IRON Your next dose is:  tonight Take 1 Tab by mouth two (2) times a day. 325 mg  
    
   
   
   
  
 lisinopril 5 mg tablet Commonly known as:  Kari Bills Your next dose is:  tomorrow Take 1 Tab by mouth daily. 5 mg  
    
   
   
   
  
 nitroglycerin 0.4 mg SL tablet Commonly known as:  NITROSTAT  
   
 1 Tab by SubLINGual route every five (5) minutes as needed for Chest Pain. 0.4 mg  
    
   
   
   
  
 senna-docusate 8.6-50 mg per tablet Commonly known as:  Isreal Apple  
   
 Over the counter colace and senakot - take while on narcotics for constipation  
     
   
   
   
  
 sertraline 25 mg tablet Commonly known as:  ZOLOFT Your next dose is:  today Take 1 Tab by mouth daily. 25 mg Where to Get Your Medications Information on where to get these meds will be given to you by the nurse or doctor. ! Ask your nurse or doctor about these medications  
  oxyCODONE-acetaminophen 5-325 mg per tablet Opioid Education Prescription Opioids: What You Need to Know: 
 
Prescription opioids can be used to help relieve moderate-to-severe pain and are often prescribed following a surgery or injury, or for certain health conditions. These medications can be an important part of treatment but also come with serious risks. Opioids are strong pain medicines. Examples include hydrocodone, oxycodone, fentanyl, and morphine. Heroin is an example of an illegal opioid. It is important to work with your health care provider to make sure you are getting the safest, most effective care. WHAT ARE THE RISKS AND SIDE EFFECTS OF OPIOID USE? Prescription opioids carry serious risks of addiction and overdose, especially with prolonged use. An opioid overdose, often marked by slow breathing, can cause sudden death. The use of prescription opioids can have a number of side effects as well, even when taken as directed. · Tolerance-meaning you might need to take more of a medication for the same pain relief · Physical dependence-meaning you have symptoms of withdrawal when the medication is stopped. Withdrawal symptoms can include nausea, sweating, chills, diarrhea, stomach cramps, and muscle aches. Withdrawal can last up to several weeks, depending on which drug you took and how long you took it. · Increased sensitivity to pain · Constipation · Nausea, vomiting, and dry mouth · Sleepiness and dizziness · Confusion · Depression · Low levels of testosterone that can result in lower sex drive, energy, and strength · Itching and sweating RISKS ARE GREATER WITH:      
· History of drug misuse, substance use disorder, or overdose · Mental health conditions (such as depression or anxiety) · Sleep apnea · Older age (72 years or older) · Pregnancy Avoid alcohol while taking prescription opioids. Also, unless specifically advised by your health care provider, medications to avoid include: · Benzodiazepines (such as Xanax or Valium) · Muscle relaxants (such as Soma or Flexeril) · Hypnotics (such as Ambien or Lunesta) · Other prescription opioids KNOW YOUR OPTIONS Talk to your health care provider about ways to manage your pain that don't involve prescription opioids. Some of these options may actually work better and have fewer risks and side effects. Options may include: 
· Pain relievers such as acetaminophen, ibuprofen, and naproxen · Some medications that are also used for depression or seizures · Physical therapy and exercise · Counseling to help patients learn how to cope better with triggers of pain and stress. · Application of heat or cold compress · Massage therapy · Relaxation techniques Be Informed Make sure you know the name of your medication, how much and how often to take it, and its potential risks & side effects. IF YOU ARE PRESCRIBED OPIOIDS FOR PAIN: 
· Never take opioids in greater amounts or more often than prescribed. Remember the goal is not to be pain-free but to manage your pain at a tolerable level. · Follow up with your primary care provider to: · Work together to create a plan on how to manage your pain. · Talk about ways to help manage your pain that don't involve prescription opioids. · Talk about any and all concerns and side effects. · Help prevent misuse and abuse. · Never sell or share prescription opioids · Help prevent misuse and abuse. · Store prescription opioids in a secure place and out of reach of others (this may include visitors, children, friends, and family). · Safely dispose of unused/unwanted prescription opioids: Find your community drug take-back program or your pharmacy mail-back program, or flush them down the toilet, following guidance from the Food and Drug Administration (www.fda.gov/Drugs/ResourcesForYou). · Visit www.cdc.gov/drugoverdose to learn about the risks of opioid abuse and overdose. · If you believe you may be struggling with addiction, tell your health care provider and ask for guidance or call [employer] at 0-087-689-TSGE. Discharge Instructions Carotid Endarterectomy: What to Expect at [facility] Your Recovery A carotid endarterectomy (say \"kuh-RAW-tid mk-caz-uti-REK-tuh-kristan\") is surgery to remove fatty build-up (plaque) from one of the carotid arteries. There are two carotid arteries-one on each side of the neck-that supply blood to the brain. When plaque builds up in either artery, it can make it hard for blood to flow to the brain. This surgery may lower your risk of having a stroke. You may have a sore throat for a few days. You can expect the cut (incision) in your neck to be sore for about a week. The area around the incision may also be swollen and bruised at first. The area in front of the incision may be numb. This usually gets better after 6 to 12 months. Your doctor closed the incision in your neck with stitches. The stitches will be removed 7 to 10 days after surgery, or you may have stitches that dissolve on their own. You may feel more tired than usual for several weeks after surgery. You will probably be able to go back to work or your usual activities in 1 to 2 weeks.  
This care sheet gives you a general idea about how long it will take for you to recover. But each person recovers at a different pace. Follow the steps below to feel better as quickly as possible. How can you care for yourself at home? Activity ? · Rest when you feel tired. Getting enough sleep will help you recover. ? · Try to walk each day. Start by walking a little more than you did the day before. Bit by bit, increase the amount you walk. Walking boosts blood flow and helps prevent pneumonia and constipation. ? · Avoid strenuous activities, such as bicycle riding, jogging, weight lifting, or aerobic exercise. Your doctor will tell youwhen it's okay to do strenuous activity. ? · For 1 to 2 weeks, avoid lifting anything that would make you strain. This may include a child, heavy grocery bags and milk containers, a heavy briefcase or backpack, cat litter or dog food bags, or a vacuum . ? · Ask your doctor when you can drive again. ? · You will probably need to take 1 to 2 weeks off from work. It depends on the type of work you do and how you feel. ? · You may shower and take baths as usual. But do not soak the incision for the first 2 weeks, or until your doctor tells you it is okay. Pat the incision dry. ? · Your doctor will tell you when you can have sex again. Diet ? · You can eat your normal diet. If your stomach is upset, try bland, low-fat foods like plain rice, broiled chicken, toast, and yogurt. ? · Drink plenty of fluids (unless your doctor tells you not to). ? · You may notice that your bowel movements are not regular right after your surgery. This is common. Try to avoid constipation and straining with bowel movements. You may want to take a fiber supplement every day. If you have not had a bowel movement after a couple of days, ask your doctor about taking a mild laxative. Medicines ? · Your doctor will tell you if and when you can restart your medicines. He or she will also give you instructionsabout taking any new medicines. ? · If you take blood thinners, such as warfarin (Coumadin), clopidogrel (Plavix), or aspirin, be sure to talk to your doctor. He or she will tell you if and when to start taking those medicines again. Make sure that you understand exactly what your doctor wants you to do.  
? · Your doctor may advise you to take aspirin when you go home. This helps prevent blood clots. Take your medicines exactly as prescribed. Call your doctor if you think you are having a problem with your medicine. ? · Be safe with medicines. Take pain medicines exactly as directed. ¨ If the doctor gave you a prescription medicine for pain, take it as prescribed. ¨ If you are not taking a prescription pain medicine, ask your doctor if you can take an over-the-counter medicine. ¨ Do not take two or more pain medicines at the same time unless the doctor told you to. Many pain medicines have acetaminophen, which is Tylenol. Too much acetaminophen (Tylenol) can be harmful. ? · If you think your pain medicine is making you sick to your stomach: 
¨ Take your medicine after meals (unless your doctor has told you not to). ¨ Ask your doctor for a different pain medicine. ? · If your doctor prescribed antibiotics, take them as directed. Do not stop taking them just because you feel better. You need to take the full course of antibiotics. ? · If you take a blood thinner, such as aspirin, be sure you get instructions about how to take your medicine safely. Blood thinners can cause serious bleeding problems. Incision care ? · If you have strips of tape over your incision, leave the tape on for a week or until it falls off.  
? · Wash the area daily with water and pat it dry. Other cleaning products, such as hydrogen peroxide, can make the wound heal more slowly. You may cover the area with a gauze bandage if it weeps or rubs against clothing. Change the bandage every day. ? · Keep the area clean and dry. Follow-up care is a key part of your treatment and safety. Be sure to make and go to all appointments, and call your doctor if you are having problems. It's also a good idea to know your test results and keep a list of the medicines you take. When should you call for help? Call 911 anytime you think you may need emergency care. For example, call if: 
? · You passed out (lost consciousness). ? · You have severe trouble breathing. ? · You have a tight bulge in your neck on the side where the surgery was done. ? · You have symptoms of a stroke. These may include: 
¨ Sudden numbness, tingling, weakness, or loss of movement in your face, arm, or leg, especially on only one side of your body. ¨ Sudden vision changes. ¨ Sudden trouble speaking. ¨ Sudden confusion or trouble understanding simple statements. ¨ Sudden problems with walking or balance. ¨ A sudden, severe headache that is different from past headaches. ? · You have chest pain or pressure. This may occur with: ¨ Sweating. ¨ Shortness of breath. ¨ Nausea or vomiting. ¨ Pain that spreads from the chest to the neck, jaw, or one or both shoulders or arms. ¨ Dizziness or lightheadedness. ¨ A fast or uneven pulse. After calling 911, chew 1 adult-strength or 2 to 4 low-dose aspirin. Wait for anambulance. Do not try to drive yourself. ?Call your doctor now or seek immediate medical care if: 
? · You have pain that does not get better after you take pain medicine. ? · You have loose stitches, or your incision comes open. ? · Bright red blood has soaked through the bandage over your incision. ? · You have signs of infection, such as: 
¨ Increased pain, swelling, warmth, or redness. ¨ Red streaks leading from the incision. ¨ Pus draining from the incision. ¨ A fever. ? Watch closely for any changes in your health, and be sure to contact your doctor if you have any problems. Where can you learn more? Go to http://key-mark.info/. Enter B110 in the search box to learn more about \"Carotid Endarterectomy: What to Expect at Home. \" Current as of: September 21, 2016 Content Version: 11.4 © 6514-9190 [employer]. Care instructions adapted under license by [employer] (which disclaims liability or warranty for this information). If you have questions about a medical condition or this instruction, always ask your healthcare professional. [address] any warranty or liability for your use of this information. BSHSI:MARGARET:[facility] DISCHARGE SUMMARY NOTE DISCHARGE SUMMARY:  
 
Patient ID: 
Name: Vikram Polk Discharge Date: 4/17/2018 Time: 9:57 AM 
 
The following personal items collected during your admission are returned to you:  
Dental Appliance: Dental Appliances: None Vision: Visual Aid: None Hearing Aid:   
Jewelry: Jewelry: None Clothing: Clothing: Jacket/Coat, Pants, Shirt, Undergarments Other Valuables: Other Valuables: Sanjiv Bergeron Valuables sent to safe: Personal Items Sent to Safe: none PATIENT INSTRUCTIONS: 
 
What to do at Home: 
 
Stroke risk factors: overweight, smoking, sedentary lifestyle Call 911 immediately if sudden numbness or weakness of an extremity is noted. Warning signs of a stroke include: 
 
· Sudden numbness or weakness of the face (is smile equal on both sides?) · Sudden numbness or weakness of the arm or leg, especially on one side of the body · Sudden confusion, trouble speaking or understanding. I have reviewed discharge instructions with the patient and spouse. The patient and spouse verbalized understanding. Reducing Heart Attack Risk With Daily Medicine: Care Instructions Your Care Instructions Heart disease is the number one cause of death. If you are at risk for heart disease, there are many medicines that can reduce your risk. These include: · ACE inhibitors. These are a type of blood pressure medicine. They can reduce the risk of heart attacks and strokes if you are at high risk. · Statin medicines. These lower cholesterol. They can also reduce the risk of heart disease and strokes. · Aspirin. It can help certain people lower their risk of a heart attack or stroke. · Beta-blocker medicines. These are a type of blood pressure and heart medicine. They can reduce the chance of early death if you have had a heart attack. All medicines can cause side effects. So it is important to understand the pros and cons of any medicine you take. It is also important to take your medicines exactly as your doctor tells you to. Follow-up care is a key part of your treatment and safety. Be sure to make and go to all appointments, and call your doctor if you are having problems. It's also a good idea to know your test results and keep a list of the medicines you take. ACE inhibitors ACE (angiotensin-converting enzyme) inhibitors are used for three main reasons. They lower blood pressure, protect the kidneys, and prevent heart attacks and strokes. Examples include benazepril (Lotensin), lisinopril (Prinivil, Zestril), and ramipril (Altace). Before you start taking an ACE inhibitor, make sure your doctor knows if: 
· You are taking a water pill (diuretic). · You are taking potassium pills or using salt substitutes. · You are pregnant or breastfeeding. · You have had a kidney transplant or other kidney problems. ACE inhibitors can cause side effects. Call your doctor right away if you have: · Trouble breathing. · Swelling in your face, head, neck, or tongue. · Dizziness or lightheadedness. · A dry cough. Statins Statins lower cholesterol. Examples include atorvastatin (Lipitor), lovastatin (Mevacor), pravastatin (Pravachol), and simvastatin (Zocor). Before you start taking a statin, make sure your doctor knows if: · You have had a kidney transplant or other kidney problems. · You have liver disease. · You take any other prescription medicine, over-the-counter medicine, vitamins, supplements, or herbal remedies. · You are pregnant or breastfeeding. Statins can cause side effects. Call your doctor right away if you have: · New, severe muscle aches. · Brown urine. Aspirin Taking an aspirin every day can lower your risk for a heart attack. A heart attack occurs when a blood vessel in the heart gets blocked. When this happens, oxygen can't get to the heart muscle, and part of the heart dies. Aspirin can help prevent blood clots that can block the blood vessels. Talk to your doctor before you start taking aspirin every day. He or she may recommend that you take one low-dose aspirin (81 mg) tablet each day, with a meal and a full glass of water. Taking aspirin isn't right for everyone. This is because it can cause serious bleeding. And you may not be able to use aspirin if you: 
· Have asthma. · Have an ulcer or other stomach problem. · Take some other medicine (called a blood thinner) that prevents blood clots. · Are allergic to aspirin. Before having a surgery or procedure, tell your doctor or dentist that you take aspirin. He or she will tell you if you should stop taking aspirin beforehand. Make sure that you understand exactly what your doctor wants you to do. Aspirin can cause side effects. Call your doctor right away if you have: · Unusual bleeding or bruising. · Nausea, vomiting, or heartburn. · Black or bloody stools. Beta-blockers Beta-blockers are used for three main reasons. They lower blood pressure, relieve angina symptoms (such as chest pain or pressure), and reduce the chances of a second heart attack. They include atenolol (Tenormin), carvedilol (Coreg), and metoprolol (Lopressor). Before you start taking a beta-blocker, make sure your doctor knows if you have: · Severe asthma or frequent asthma attacks. · A very slow pulse (less than 55 beats a minute). Beta-blockers can cause side effects. Call your doctor right away if you have: · Wheezing or trouble breathing. · Dizziness or lightheadedness. · Asthma that gets worse. When should you call for help? Watch closely for changes in your health, and be sure to contact your doctor if you have any problems. Where can you learn more? Go to http://key-mark.info/. Enter R428 in the search box to learn more about \"Reducing Heart Attack Risk With Daily Medicine: Care Instructions. \" Current as of: September 21, 2016 Content Version: 11.4 © 4167-1801 [employer]. Care instructions adapted under license by [employer] (which disclaims liability or warranty for this information). If you have questions about a medical condition or this instruction, always ask your healthcare professional. [address] any warranty or liability for your use of this information. Introducing \[facility]\"" & HEALTH SERVICES! Dear Ro Ortiz: Thank you for requesting a [employer] account. Our records indicate that you already have an active [employer] account. You can access your account anytime at [email or web address] Did you know that you can access your hospital and ER discharge instructions at any time in [employer]? You can also review all of your test results from your hospital stay or ER visit. Additional Information If you have questions, please visit the Frequently Asked Questions section of the [employer] website at [email or web address] Remember, [employer] is NOT to be used for urgent needs. For medical emergencies, dial 911. Now available from your iPhone and Android! Introducing Giuseppe Diana As a Otilia Old patient, I wanted to make you aware of our electronic visit tool called Giuseppe Diana. [employer] 24/7 allows you to connect within minutes with a medical provider 24 hours a day, seven days a week via a mobile device or tablet or logging into a secure website from your computer. You can access [employer] from anywhere in the [place]. A virtual visit might be right for you when you have a simple condition and feel like you just dont want to get out of bed, or cant get away from work for an appointment, when your regular [employer] provider is not available (evenings, weekends or holidays), or when youre out of town and need minor care. Electronic visits cost only $49 and if the [employer] 24/7 provider determines a prescription is needed to treat your condition, one can be electronically transmitted to a nearby pharmacy*. Please take a moment to enroll today if you have not already done so. The enrollment process is free and takes just a few minutes. To enroll, please download the [employer] 24/7 urszula to your tablet or phone, or visit [email or web address] to enroll on your computer. And, as an [address] [place] [address] patient with a [email or web address] account, the results of your visits will be scanned into your electronic medical record and your primary care provider will be able to view the scanned results. We urge you to continue to see your regular [employer] provider for your ongoing medical care. And while your primary care provider may not be the one available when you seek a [employer] virtual visit, the peace of mind you get from getting a real diagnosis real time can be priceless. For more information on [employer], view our Frequently Asked Questions (FAQs) at [email or web address]. Sincerely, 
 
Mirna Giles MD 
Chief Medical Officer [employer] *:  certain medications cannot be prescribed via [employer] Providers Seen During Your Hospitalization Provider Specialty Primary office phone Arcenio Yepez MD Vascular Surgery [phone] Your Primary Care Physician (PCP) Primary Care Physician Office Phone Office Fax Gibran Hall [phone] You are allergic to the following Allergen Reactions Adhesive Rash Patient states skin gets irritated when adhesive is left on him for a period of time. Recent Documentation Height Weight BMI Smoking Status 1.778 m 92 kg 29.1 kg/m2 Former Smoker Emergency Contacts Name Discharge Info Relation Home Work Mobile Fernando Tamayo CAREGIVER [3] Spouse [3] [phone] Patient Belongings The following personal items are in your possession at time of discharge: 
  Dental Appliances: None  Visual Aid: None      Home Medications: None   Jewelry: None  Clothing: Jacket/Coat, Pants, Shirt, Undergarments    Other Valuables: Eyeglasses  Personal Items Sent to Safe: none Discharge Instructions Attachments/References SECONDHAND SMOKE (ENGLISH) Patient Handouts Secondhand Smoke: Care Instructions Your Care Instructions Secondhand smoke comes from the burning end of a cigarette, cigar, or pipe and the smoke that a smoker exhales. The smoke contains nicotine and many other harmful chemicals. Breathing secondhand smoke can cause or worsen health problems including cancer, asthma, coronary artery disease, and respiratory infections. It can make your eyes and nose burn and cause a sore throat. Secondhand smoke is especially bad for babies and young children whose lungs are still developing. Babies whose parents smoke are more likely to have ear infections, pneumonia, and bronchitis in the first few years of their lives. Secondhand smoke can make asthma symptoms worse in children. If you are pregnant, it is important that you not smoke and that you avoid secondhand smoke.  You are more likely to give birth to a baby who weighs less than expected (low birth weight) if you smoke. And your baby may have a greater risk for sudden infant death syndrome (SIDS). Babies whose mothers are exposed to secondhand smoke during pregnancy have a higher risk for health problems. Follow-up care is a key part of your treatment and safety. Be sure to make and go to all appointments, and call your doctor if you are having problems. It's also a good idea to know your test results and keep a list of the medicines you take. How can you care for yourself at home? · Do not smoke or let anyone else smoke in your home. If people must smoke, ask them to go outside. · If people do smoke in your home, choose a room where you can open a window or use a fan to get the smoke outside. · Do not let anyone smoke in your car. If someone must smoke, pull over in a safe place and let him or her smoke away from the car. · Ask your employer to make sure that you have a smoke-free work area. · Make sure that your children are not exposed to secondhand smoke at day care, school, and after-school programs. · Try to choose nonsmoking bars, restaurants, and other public places when you go out. · Help your family and friends who smoke to quit by encouraging them to try. Tell them about treatment resources. Having support from others often helps. · If you smoke, quit. Quitting is hard, but there are ways to boost your chance of quitting tobacco for good. ¨ Use nicotine gum, patches, or lozenges. Call a quitline. Ask your doctor about stop-smoking programs and medicines. ¨ Keep trying. When should you call for help? Watch closely for changes in your health, and be sure to contact your doctor if you have any problems. Where can you learn more? Go to http://key-mark.info/. Enter L004 in the search box to learn more about \"Secondhand Smoke: Care Instructions. \" Current as of: March 20, 2017 Content Version: 11.4 © 2685-0257 [employer]. Care instructions adapted under license by [employer] (which disclaims liability or warranty for this information). If you have questions about a medical condition or this instruction, always ask your healthcare professional. [address] any warranty or liability for your use of this information. Please provide this summary of care documentation to your next provider. Signatures-by signing, you are acknowledging that this After Visit Summary has been reviewed with you and you have received a copy. Patient Signature:  ____________________________________________________________ Date:  ____________________________________________________________  
  
Otoniel Ramos Provider Signature:  ____________________________________________________________ Date:  ____________________________________________________________

## 2018-04-16 NOTE — PROGRESS NOTES
TRANSFER - IN REPORT:    Verbal report received from CRNA on Felix Orta  being received from OR(unit) for routine post - op      Report consisted of patients Situation, Background, Assessment and   Recommendations(SBAR). Information from the following report(s) SBAR, OR Summary, MAR, Accordion and Recent Results was reviewed with the receiving nurse. Opportunity for questions and clarification was provided. Assessment completed upon patients arrival to unit and care assumed.

## 2018-04-16 NOTE — PROGRESS NOTES
Problem: Falls - Risk of  Goal: *Absence of Falls  Document Elaine Fall Risk and appropriate interventions in the flowsheet.    Outcome: Progressing Towards Goal  Fall Risk Interventions:            Medication Interventions: Assess postural VS orthostatic hypotension, Bed/chair exit alarm, Evaluate medications/consider consulting pharmacy, Patient to call before getting OOB, Teach patient to arise slowly    Elimination Interventions: Bed/chair exit alarm, Call light in reach, Patient to call for help with toileting needs, Toileting schedule/hourly rounds

## 2018-04-16 NOTE — BRIEF OP NOTE
BRIEF OPERATIVE NOTE    Date of Procedure: 4/16/2018   Preoperative Diagnosis: Carotid artery stenosis, symptomatic, right [I65.21]  Postoperative Diagnosis: Carotid artery stenosis, symptomatic, right [I65.21]    Procedure(s):  RIGHT CAROTID ENDARTERECTOMY WITH PATCH ANGIOPLASTY  Surgeon(s) and Role:     * Bethany Meade MD - Primary         Surgical Assistant:     Surgical Staff:  Circ-1: Daniela Curry RN  Circ-Relief: Minus Spike  Scrub Tech-1: Veena Inman  Scrub Tech-2: Eduin Neri  Event Time In   Incision Start 0800   Incision Close      Anesthesia: General   Estimated Blood Loss: 100cc  Specimens: * No specimens in log *   Findings:    Complications: None  Implants:   Implant Name Type Inv.  Item Serial No.  Lot No. LRB No. Used [facility] VASC PERIPATCH [id number] -- Gerry Reed - [id number]   PATCH VASC PERIPATCH 0.8X8CM -- [facility] [employer] [id number] 1 Implanted

## 2018-04-17 VITALS
TEMPERATURE: 97.6 F | DIASTOLIC BLOOD PRESSURE: 58 MMHG | HEART RATE: 77 BPM | BODY MASS INDEX: 29.04 KG/M2 | RESPIRATION RATE: 20 BRPM | SYSTOLIC BLOOD PRESSURE: 126 MMHG | OXYGEN SATURATION: 99 % | HEIGHT: 70 IN | WEIGHT: 202.82 LBS

## 2018-04-17 PROCEDURE — 74011250637 HC RX REV CODE- 250/637: Performed by: SURGERY

## 2018-04-17 RX ORDER — LISINOPRIL 5 MG/1
5 TABLET ORAL DAILY
Status: DISCONTINUED | OUTPATIENT
Start: 2018-04-17 | End: 2018-04-17 | Stop reason: HOSPADM

## 2018-04-17 RX ORDER — OXYCODONE AND ACETAMINOPHEN 5; 325 MG/1; MG/1
1 TABLET ORAL
Qty: 30 TAB | Refills: 0 | Status: SHIPPED | OUTPATIENT
Start: 2018-04-17 | End: 2019-06-05

## 2018-04-17 RX ADMIN — Medication 10 ML: at 05:52

## 2018-04-17 RX ADMIN — ACETAMINOPHEN 650 MG: 325 TABLET ORAL at 07:14

## 2018-04-17 RX ADMIN — ASPIRIN 81 MG: 81 TABLET, COATED ORAL at 08:03

## 2018-04-17 RX ADMIN — LISINOPRIL 5 MG: 5 TABLET ORAL at 08:03

## 2018-04-17 RX ADMIN — CARVEDILOL 25 MG: 25 TABLET, FILM COATED ORAL at 08:03

## 2018-04-17 NOTE — DISCHARGE INSTRUCTIONS
Carotid Endarterectomy: What to Expect at [facility]  A carotid endarterectomy (say \"shae-RAW-sheilad vx-cxr-tia-VIRIK-tuh-kristan\") is surgery to remove fatty build-up (plaque) from one of the carotid arteries. There are two carotid arteries-one on each side of the neck-that supply blood to the brain. When plaque builds up in either artery, it can make it hard for blood to flow to the brain. This surgery may lower your risk of having a stroke. You may have a sore throat for a few days. You can expect the cut (incision) in your neck to be sore for about a week. The area around the incision may also be swollen and bruised at first. The area in front of the incision may be numb. This usually gets better after 6 to 12 months. Your doctor closed the incision in your neck with stitches. The stitches will be removed 7 to 10 days after surgery, or you may have stitches that dissolve on their own. You may feel more tired than usual for several weeks after surgery. You will probably be able to go back to work or your usual activities in 1 to 2 weeks. This care sheet gives you a general idea about how long it will take for you to recover. But each person recovers at a different pace. Follow the steps below to feel better as quickly as possible. How can you care for yourself at home? Activity  ? · Rest when you feel tired. Getting enough sleep will help you recover. ? · Try to walk each day. Start by walking a little more than you did the day before. Bit by bit, increase the amount you walk. Walking boosts blood flow and helps prevent pneumonia and constipation. ? · Avoid strenuous activities, such as bicycle riding, jogging, weight lifting, or aerobic exercise. Your doctor will tell youwhen it's okay to do strenuous activity. ? · For 1 to 2 weeks, avoid lifting anything that would make you strain.  This may include a child, heavy grocery bags and milk containers, a heavy briefcase or backpack, cat litter or dog food bags, or a vacuum . ? · Ask your doctor when you can drive again. ? · You will probably need to take 1 to 2 weeks off from work. It depends on the type of work you do and how you feel. ? · You may shower and take baths as usual. But do not soak the incision for the first 2 weeks, or until your doctor tells you it is okay. Pat the incision dry. ? · Your doctor will tell you when you can have sex again. Diet  ? · You can eat your normal diet. If your stomach is upset, try bland, low-fat foods like plain rice, broiled chicken, toast, and yogurt. ? · Drink plenty of fluids (unless your doctor tells you not to). ? · You may notice that your bowel movements are not regular right after your surgery. This is common. Try to avoid constipation and straining with bowel movements. You may want to take a fiber supplement every day. If you have not had a bowel movement after a couple of days, ask your doctor about taking a mild laxative. Medicines  ? · Your doctor will tell you if and when you can restart your medicines. He or she will also give you instructionsabout taking any new medicines. ? · If you take blood thinners, such as warfarin (Coumadin), clopidogrel (Plavix), or aspirin, be sure to talk to your doctor. He or she will tell you if and when to start taking those medicines again. Make sure that you understand exactly what your doctor wants you to do.   ? · Your doctor may advise you to take aspirin when you go home. This helps prevent blood clots. Take your medicines exactly as prescribed. Call your doctor if you think you are having a problem with your medicine. ? · Be safe with medicines. Take pain medicines exactly as directed. ¨ If the doctor gave you a prescription medicine for pain, take it as prescribed. ¨ If you are not taking a prescription pain medicine, ask your doctor if you can take an over-the-counter medicine.   ¨ Do not take two or more pain medicines at the same time unless the doctor told you to. Many pain medicines have acetaminophen, which is Tylenol. Too much acetaminophen (Tylenol) can be harmful. ? · If you think your pain medicine is making you sick to your stomach:  ¨ Take your medicine after meals (unless your doctor has told you not to). ¨ Ask your doctor for a different pain medicine. ? · If your doctor prescribed antibiotics, take them as directed. Do not stop taking them just because you feel better. You need to take the full course of antibiotics. ? · If you take a blood thinner, such as aspirin, be sure you get instructions about how to take your medicine safely. Blood thinners can cause serious bleeding problems. Incision care  ? · If you have strips of tape over your incision, leave the tape on for a week or until it falls off.   ? · Wash the area daily with water and pat it dry. Other cleaning products, such as hydrogen peroxide, can make the wound heal more slowly. You may cover the area with a gauze bandage if it weeps or rubs against clothing. Change the bandage every day. ? · Keep the area clean and dry. Follow-up care is a key part of your treatment and safety. Be sure to make and go to all appointments, and call your doctor if you are having problems. It's also a good idea to know your test results and keep a list of the medicines you take. When should you call for help? Call 911 anytime you think you may need emergency care. For example, call if:  ? · You passed out (lost consciousness). ? · You have severe trouble breathing. ? · You have a tight bulge in your neck on the side where the surgery was done. ? · You have symptoms of a stroke. These may include:  ¨ Sudden numbness, tingling, weakness, or loss of movement in your face, arm, or leg, especially on only one side of your body. ¨ Sudden vision changes. ¨ Sudden trouble speaking. ¨ Sudden confusion or trouble understanding simple statements.   ¨ Sudden problems with walking or balance. ¨ A sudden, severe headache that is different from past headaches. ? · You have chest pain or pressure. This may occur with:  ¨ Sweating. ¨ Shortness of breath. ¨ Nausea or vomiting. ¨ Pain that spreads from the chest to the neck, jaw, or one or both shoulders or arms. ¨ Dizziness or lightheadedness. ¨ A fast or uneven pulse. After calling 911, chew 1 adult-strength or 2 to 4 low-dose aspirin. Wait for anambulance. Do not try to drive yourself. ?Call your doctor now or seek immediate medical care if:  ? · You have pain that does not get better after you take pain medicine. ? · You have loose stitches, or your incision comes open. ? · Bright red blood has soaked through the bandage over your incision. ? · You have signs of infection, such as:  ¨ Increased pain, swelling, warmth, or redness. ¨ Red streaks leading from the incision. ¨ Pus draining from the incision. ¨ A fever. ? Watch closely for any changes in your health, and be sure to contact your doctor if you have any problems. Where can you learn more? Go to http://key-mark.info/. Enter A976 in the search box to learn more about \"Carotid Endarterectomy: What to Expect at Home. \"  Current as of: September 21, 2016  Content Version: 11.4  © 2929-6105 [employer]. Care instructions adapted under license by [employer] (which disclaims liability or warranty for this information). If you have questions about a medical condition or this instruction, always ask your healthcare professional. [place] [address] any warranty or liability for your use of this information.   BSHSI:MARGARET:[facility] DISCHARGE SUMMARY NOTE    DISCHARGE SUMMARY:     Patient ID:  Name: Priscilla Lowe  Discharge Date: 4/17/2018  Time: 9:57 AM    The following personal items collected during your admission are returned to you:   Dental Appliance: Dental Appliances: None  Vision: Visual Aid: None  Hearing Aid: Jewelry: Jewelry: None  Clothing: Clothing: Jacket/Coat, Pants, Shirt, Undergarments  Other Valuables: Other Valuables: Eyeglasses  Valuables sent to safe: Personal Items Sent to Safe: none      PATIENT INSTRUCTIONS:    What to do at Home:    Stroke risk factors: overweight, smoking, sedentary lifestyle    Call 911 immediately if sudden numbness or weakness of an extremity is noted. Warning signs of a stroke include:    · Sudden numbness or weakness of the face (is smile equal on both sides?)  · Sudden numbness or weakness of the arm or leg, especially on one side of the body  · Sudden confusion, trouble speaking or understanding. I have reviewed discharge instructions with the patient and spouse. The patient and spouse verbalized understanding. Reducing Heart Attack Risk With Daily Medicine: Care Instructions  Your Care Instructions    Heart disease is the number one cause of death. If you are at risk for heart disease, there are many medicines that can reduce your risk. These include:  · ACE inhibitors. These are a type of blood pressure medicine. They can reduce the risk of heart attacks and strokes if you are at high risk. · Statin medicines. These lower cholesterol. They can also reduce the risk of heart disease and strokes. · Aspirin. It can help certain people lower their risk of a heart attack or stroke. · Beta-blocker medicines. These are a type of blood pressure and heart medicine. They can reduce the chance of early death if you have had a heart attack. All medicines can cause side effects. So it is important to understand the pros and cons of any medicine you take. It is also important to take your medicines exactly as your doctor tells you to. Follow-up care is a key part of your treatment and safety. Be sure to make and go to all appointments, and call your doctor if you are having problems. It's also a good idea to know your test results and keep a list of the medicines you take.   ACE inhibitors  ACE (angiotensin-converting enzyme) inhibitors are used for three main reasons. They lower blood pressure, protect the kidneys, and prevent heart attacks and strokes. Examples include benazepril (Lotensin), lisinopril (Prinivil, Zestril), and ramipril (Altace). Before you start taking an ACE inhibitor, make sure your doctor knows if:  · You are taking a water pill (diuretic). · You are taking potassium pills or using salt substitutes. · You are pregnant or breastfeeding. · You have had a kidney transplant or other kidney problems. ACE inhibitors can cause side effects. Call your doctor right away if you have:  · Trouble breathing. · Swelling in your face, head, neck, or tongue. · Dizziness or lightheadedness. · A dry cough. Statins  Statins lower cholesterol. Examples include atorvastatin (Lipitor), lovastatin (Mevacor), pravastatin (Pravachol), and simvastatin (Zocor). Before you start taking a statin, make sure your doctor knows if:  · You have had a kidney transplant or other kidney problems. · You have liver disease. · You take any other prescription medicine, over-the-counter medicine, vitamins, supplements, or herbal remedies. · You are pregnant or breastfeeding. Statins can cause side effects. Call your doctor right away if you have:  · New, severe muscle aches. · Brown urine. Aspirin  Taking an aspirin every day can lower your risk for a heart attack. A heart attack occurs when a blood vessel in the heart gets blocked. When this happens, oxygen can't get to the heart muscle, and part of the heart dies. Aspirin can help prevent blood clots that can block the blood vessels. Talk to your doctor before you start taking aspirin every day. He or she may recommend that you take one low-dose aspirin (81 mg) tablet each day, with a meal and a full glass of water. Taking aspirin isn't right for everyone. This is because it can cause serious bleeding.  And you may not be able to use aspirin if you:  · Have asthma. · Have an ulcer or other stomach problem. · Take some other medicine (called a blood thinner) that prevents blood clots. · Are allergic to aspirin. Before having a surgery or procedure, tell your doctor or dentist that you take aspirin. He or she will tell you if you should stop taking aspirin beforehand. Make sure that you understand exactly what your doctor wants you to do. Aspirin can cause side effects. Call your doctor right away if you have:  · Unusual bleeding or bruising. · Nausea, vomiting, or heartburn. · Black or bloody stools. Beta-blockers  Beta-blockers are used for three main reasons. They lower blood pressure, relieve angina symptoms (such as chest pain or pressure), and reduce the chances of a second heart attack. They include atenolol (Tenormin), carvedilol (Coreg), and metoprolol (Lopressor). Before you start taking a beta-blocker, make sure your doctor knows if you have:  · Severe asthma or frequent asthma attacks. · A very slow pulse (less than 55 beats a minute). Beta-blockers can cause side effects. Call your doctor right away if you have:  · Wheezing or trouble breathing. · Dizziness or lightheadedness. · Asthma that gets worse. When should you call for help? Watch closely for changes in your health, and be sure to contact your doctor if you have any problems. Where can you learn more? Go to http://key-mark.info/. Enter R428 in the search box to learn more about \"Reducing Heart Attack Risk With Daily Medicine: Care Instructions. \"  Current as of: September 21, 2016  Content Version: 11.4  © 7236-2142 [employer]. Care instructions adapted under license by [employer] (which disclaims liability or warranty for this information).  If you have questions about a medical condition or this instruction, always ask your healthcare professional. [address] any warranty or liability for your use of this information.

## 2018-04-17 NOTE — PROGRESS NOTES
JOEL spoke with Garret with [facility] who noted pt's discharge without orders to resume [place]. JOEL spoke with Wilfredo, charge RN, who said to resume previous orders. Order entered.

## 2018-04-17 NOTE — PROGRESS NOTES
Left radial art line removed at this time with cath tip intact. Pressure held to achieve hemostasis. No bleeding noted.   Will monitor closely

## 2018-04-17 NOTE — PROGRESS NOTES
Discussed with the patient and all questioned fully answered. He will call me if any problems arise. Pt discharged home with wife 4/17/18 [id number].

## 2018-04-17 NOTE — PROGRESS NOTES
Problem: Carotid Endarterectomy Pathway: Day of Surgery (YORDAN)  Goal: Nutrition/Diet  Outcome: Progressing Towards Goal  Clear liquids as tolerated  Goal: Medications  Outcome: Progressing Towards Goal  Administer pain meds and antibiotics as ordered  Goal: Respiratory  Outcome: Progressing Towards Goal  Weaned to room air  Goal: *Optimal pain control at patient's stated goal  Outcome: Progressing Towards Goal  Pain well controlled with ordered pain meds

## 2018-04-18 NOTE — OP NOTES
[facility] REPORT    Faye Watson  MR#: [id number]  : 1960  ACCOUNT #: [de-identified]   DATE OF SERVICE: 2018    PREOPERATIVE DIAGNOSIS:  Carotid stenosis. POSTOPERATIVE DIAGNOSIS:  Carotid stenosis. PROCEDURE:  Right carotid endarterectomy with bovine pericardial patch angioplasty. SURGEON:  Rupa Lamas MD.    Tiffany Kat. ANESTHESIA:  General endotracheal.    ESTIMATED BLOOD LOSS:  100 mL. DRAINS:  None. SPECIMENS:  None. COMPLICATIONS:  None. IMPLANTS:       DESCRIPTION OF PROCEDURE:  The patient was brought to operating room and placed on the operating table in supine position. Following adequate general endotracheal anesthesia and timeout procedure, the right neck was draped and prepped in sterile fashion. An oblique incision was made along the anterior border of the sternocleidomastoid muscle. The wound was deepened using Bovie to control bleeding. The platysma was divided throughout the length of the wound. The dissection was carried downward and the facial branch of the internal jugular vein was divided between silk ties. Next, the internal carotid, external carotid and common carotid arteries were identified, mobilized and encircled with vessel loops in Juan fashion. The patient was then systemically heparinized. Next, the ICA, ECA and CCA were occluded sequentially. A longitudinal arteriotomy was performed in the common carotid artery, extending across the plaque and onto the internal carotid artery. An [place] shunt was then placed in the internal carotid artery and then proximally in the common carotid artery, restoring flow. Next, the endarterectomy was performed. The plaque was mobilized in the proximal end of the wound circumferentially and divided.   The plaque was then mobilized from proximal to distal.  The plaque extending into the external carotid artery was removed via eversion technique. Next, the remainder of the plaque extending into the internal carotid was removed and with downward traction, a smooth tapered endpoint was achieved. The endarterectomy site was then carefully debrided of all movable debris. Once this was completed, the bovine pericardial patch was sewn into position with a running 6-0 Prolene suture brought out from either corner. Prior to completion of the closure, the shunt was removed and the native vessels were flushed and backbled. Dextran solution was then used to irrigate the endarterectomy site. The closure was then completed and flow was restored to the external carotid artery and then to the internal carotid artery. Flow was confirmed by Doppler. Protamine was administered to reverse heparin effect. Next, the wound was closed in layers with Vicryl suture. A 15 JORGE drain was placed. Sterile dressings were applied. The patient was awakened from anesthesia and moved all extremities on command. Patient tolerated procedure well. No complications. All needle and sponge counts were correct.       MD Frida Fields / ONDINA  D: 04/17/2018 15:22     T: 04/17/2018 20:12  JOB #: [id number]

## 2018-04-19 ENCOUNTER — HOME CARE VISIT (OUTPATIENT)
Dept: SCHEDULING | Facility: HOME HEALTH | Age: 58
End: 2018-04-19
Payer: COMMERCIAL

## 2018-04-19 VITALS
BODY MASS INDEX: 29.27 KG/M2 | TEMPERATURE: 97.8 F | WEIGHT: 204 LBS | RESPIRATION RATE: 18 BRPM | HEART RATE: 90 BPM | SYSTOLIC BLOOD PRESSURE: 110 MMHG | OXYGEN SATURATION: 96 % | DIASTOLIC BLOOD PRESSURE: 53 MMHG

## 2018-04-19 PROCEDURE — G0299 HHS/HOSPICE OF RN EA 15 MIN: HCPCS

## 2018-04-21 ENCOUNTER — HOME CARE VISIT (OUTPATIENT)
Dept: HOME HEALTH SERVICES | Facility: HOME HEALTH | Age: 58
End: 2018-04-21
Payer: COMMERCIAL

## 2018-04-25 ENCOUNTER — HOME CARE VISIT (OUTPATIENT)
Dept: SCHEDULING | Facility: HOME HEALTH | Age: 58
End: 2018-04-25
Payer: COMMERCIAL

## 2018-04-25 VITALS
SYSTOLIC BLOOD PRESSURE: 130 MMHG | DIASTOLIC BLOOD PRESSURE: 72 MMHG | OXYGEN SATURATION: 98 % | TEMPERATURE: 98 F | HEART RATE: 73 BPM | RESPIRATION RATE: 18 BRPM

## 2018-04-25 VITALS
BODY MASS INDEX: 28.55 KG/M2 | OXYGEN SATURATION: 98 % | SYSTOLIC BLOOD PRESSURE: 132 MMHG | TEMPERATURE: 97.5 F | WEIGHT: 199 LBS | DIASTOLIC BLOOD PRESSURE: 78 MMHG | RESPIRATION RATE: 18 BRPM | HEART RATE: 73 BPM

## 2018-04-25 PROCEDURE — G0299 HHS/HOSPICE OF RN EA 15 MIN: HCPCS

## 2018-04-25 PROCEDURE — G0151 HHCP-SERV OF PT,EA 15 MIN: HCPCS

## 2018-05-01 ENCOUNTER — HOSPITAL ENCOUNTER (OUTPATIENT)
Dept: CARDIAC REHAB | Age: 58
Discharge: HOME | End: 2018-05-01
Payer: COMMERCIAL

## 2018-05-01 VITALS — BODY MASS INDEX: 28.98 KG/M2 | WEIGHT: 202.4 LBS | HEIGHT: 70 IN

## 2018-05-01 PROCEDURE — 93798 PHYS/QHP OP CAR RHAB W/ECG: CPT

## 2018-05-02 ENCOUNTER — HOSPITAL ENCOUNTER (OUTPATIENT)
Dept: CARDIAC REHAB | Age: 58
Discharge: HOME | End: 2018-05-02
Payer: COMMERCIAL

## 2018-05-02 VITALS — BODY MASS INDEX: 29.04 KG/M2 | WEIGHT: 202.4 LBS

## 2018-05-02 PROCEDURE — 93798 PHYS/QHP OP CAR RHAB W/ECG: CPT

## 2018-05-04 ENCOUNTER — HOSPITAL ENCOUNTER (OUTPATIENT)
Dept: CARDIAC REHAB | Age: 58
Discharge: HOME | End: 2018-05-04
Payer: COMMERCIAL

## 2018-05-04 PROCEDURE — 93798 PHYS/QHP OP CAR RHAB W/ECG: CPT

## 2018-05-07 ENCOUNTER — HOSPITAL ENCOUNTER (OUTPATIENT)
Dept: CARDIAC REHAB | Age: 58
Discharge: HOME | End: 2018-05-07
Payer: COMMERCIAL

## 2018-05-07 PROCEDURE — 93798 PHYS/QHP OP CAR RHAB W/ECG: CPT

## 2018-05-09 ENCOUNTER — HOSPITAL ENCOUNTER (OUTPATIENT)
Dept: CARDIAC REHAB | Age: 58
Discharge: HOME | End: 2018-05-09
Payer: COMMERCIAL

## 2018-05-09 VITALS — WEIGHT: 202.4 LBS | BODY MASS INDEX: 29.04 KG/M2

## 2018-05-09 PROCEDURE — 93798 PHYS/QHP OP CAR RHAB W/ECG: CPT

## 2018-05-11 ENCOUNTER — HOSPITAL ENCOUNTER (OUTPATIENT)
Dept: CARDIAC REHAB | Age: 58
Discharge: HOME | End: 2018-05-11
Payer: COMMERCIAL

## 2018-05-11 PROCEDURE — 93798 PHYS/QHP OP CAR RHAB W/ECG: CPT

## 2018-05-14 ENCOUNTER — HOSPITAL ENCOUNTER (OUTPATIENT)
Dept: CARDIAC REHAB | Age: 58
Discharge: HOME | End: 2018-05-14
Payer: COMMERCIAL

## 2018-05-14 PROCEDURE — 93798 PHYS/QHP OP CAR RHAB W/ECG: CPT

## 2018-05-16 ENCOUNTER — HOSPITAL ENCOUNTER (OUTPATIENT)
Dept: CARDIAC REHAB | Age: 58
Discharge: HOME | End: 2018-05-16
Payer: COMMERCIAL

## 2018-05-16 VITALS — WEIGHT: 202.4 LBS | BODY MASS INDEX: 29.04 KG/M2

## 2018-05-16 PROCEDURE — 93798 PHYS/QHP OP CAR RHAB W/ECG: CPT

## 2018-05-18 ENCOUNTER — HOSPITAL ENCOUNTER (OUTPATIENT)
Dept: CARDIAC REHAB | Age: 58
Discharge: HOME | End: 2018-05-18
Payer: COMMERCIAL

## 2018-05-18 PROCEDURE — 93798 PHYS/QHP OP CAR RHAB W/ECG: CPT

## 2018-05-21 ENCOUNTER — HOSPITAL ENCOUNTER (OUTPATIENT)
Dept: CARDIAC REHAB | Age: 58
Discharge: HOME | End: 2018-05-21
Payer: COMMERCIAL

## 2018-05-21 PROCEDURE — 93798 PHYS/QHP OP CAR RHAB W/ECG: CPT

## 2018-05-21 NOTE — PROGRESS NOTES
Client History:  Current Medical Diagnosis: see ITP  Pertinent Medications: see review PTA meds      Have you gained or lost any weight in the past 3 months: lost 13 lbs since surgery     What do you attribute it to: MI and surgery  What is your weight goal: see cardiac ITP    Have you made any changes in your eating habits since your heart problems began: yes, less red meat and cut out sodas and increased water     Describe your appetite: good    Supplements/Vitamins/Herbs: iron    Food allergies: no    Problems with digestion, N/V/C/D: no    Alcohol use: see cardiac ITP      Dietary recall:  Use to skip breakfast now eats [place] yogurt and fruit or cheerios and 2% milk  Lunch is grilled chicken with a salad  Snack is fresh fruit or chips with [employer] is grilled chicken, pasta   Water is 120 oz/day            Anthropometric Data: see cardiac ITP  Ht:   Wt:   Age:  BMI:  Waist measurement:     Estimated Nutritional Needs:  Calories: 20 kcal/kg ERD=9219  Protein: 1gm/kg IBW=75gm   CHO:   Fluids: 1ml/kcal unless restricted by MD      Nutrition Diagnosis:  Food and nutrition knowledge deficit related to therapeutic diet as evidenced by lack of formal prior education. Nutrition Intervention:  Reviewed lab/body comp results/goals. Reviewed rate your plate and heart healthy choices. Reviewed fiber and sodium guidelines. Reviewed label reading. Reviewed plate method for portion control, reviewed wt loss tips. Reviewed preventing type 2 diabetes and encouraged eating a protein with meals and snacks and using high fiber carbohydrates. Affirmed that pt was drinking a lot of water and not eating within 3 hrs of bed to help with wt loss. Pt also walking daily and has bike at home. Handouts: lab/body comp, heart healthy, grocery guide, fiber, herbs and spices, recipes, recipe modification tips, plate method, wt loss tips, preventing type 2 diabetes.     Goals: See cardiac ITP         Monitoring/Evaluation: Follow-up appointment: RD to follow up with participant during cardiac rehab sessions for questions and assessment of progression toward goals.     Lexus Oneill, MINAL, LD, CDE  Phone: [phone]

## 2018-05-23 ENCOUNTER — HOSPITAL ENCOUNTER (OUTPATIENT)
Dept: CARDIAC REHAB | Age: 58
Discharge: HOME | End: 2018-05-23
Payer: COMMERCIAL

## 2018-05-23 VITALS — BODY MASS INDEX: 29.39 KG/M2 | WEIGHT: 204.8 LBS

## 2018-05-23 PROCEDURE — 93798 PHYS/QHP OP CAR RHAB W/ECG: CPT

## 2018-05-30 ENCOUNTER — HOSPITAL ENCOUNTER (OUTPATIENT)
Dept: CARDIAC REHAB | Age: 58
Discharge: HOME | End: 2018-05-30
Payer: COMMERCIAL

## 2018-05-30 PROCEDURE — 93798 PHYS/QHP OP CAR RHAB W/ECG: CPT

## 2018-06-01 ENCOUNTER — HOSPITAL ENCOUNTER (OUTPATIENT)
Dept: CARDIAC REHAB | Age: 58
Discharge: HOME | End: 2018-06-01
Payer: COMMERCIAL

## 2018-06-01 PROCEDURE — 93798 PHYS/QHP OP CAR RHAB W/ECG: CPT

## 2018-06-04 ENCOUNTER — APPOINTMENT (OUTPATIENT)
Dept: CARDIAC REHAB | Age: 58
End: 2018-06-04
Payer: COMMERCIAL

## 2018-06-05 ENCOUNTER — APPOINTMENT (OUTPATIENT)
Dept: CARDIAC REHAB | Age: 58
End: 2018-06-05
Payer: COMMERCIAL

## 2018-06-06 ENCOUNTER — APPOINTMENT (OUTPATIENT)
Dept: CARDIAC REHAB | Age: 58
End: 2018-06-06
Payer: COMMERCIAL

## 2018-06-07 ENCOUNTER — HOSPITAL ENCOUNTER (OUTPATIENT)
Dept: CARDIAC REHAB | Age: 58
Discharge: HOME | End: 2018-06-07
Payer: COMMERCIAL

## 2018-06-07 VITALS — WEIGHT: 202.2 LBS | BODY MASS INDEX: 29.01 KG/M2

## 2018-06-07 PROCEDURE — 93798 PHYS/QHP OP CAR RHAB W/ECG: CPT

## 2018-06-08 ENCOUNTER — HOSPITAL ENCOUNTER (OUTPATIENT)
Dept: CARDIAC REHAB | Age: 58
Discharge: HOME | End: 2018-06-08
Payer: COMMERCIAL

## 2018-06-08 PROCEDURE — 93798 PHYS/QHP OP CAR RHAB W/ECG: CPT

## 2018-06-11 ENCOUNTER — APPOINTMENT (OUTPATIENT)
Dept: CARDIAC REHAB | Age: 58
End: 2018-06-11
Payer: COMMERCIAL

## 2018-06-12 ENCOUNTER — HOSPITAL ENCOUNTER (OUTPATIENT)
Dept: CARDIAC REHAB | Age: 58
Discharge: HOME | End: 2018-06-12
Payer: COMMERCIAL

## 2018-06-12 VITALS — WEIGHT: 207 LBS | BODY MASS INDEX: 29.7 KG/M2

## 2018-06-12 PROCEDURE — 93798 PHYS/QHP OP CAR RHAB W/ECG: CPT

## 2018-06-13 ENCOUNTER — APPOINTMENT (OUTPATIENT)
Dept: CARDIAC REHAB | Age: 58
End: 2018-06-13
Payer: COMMERCIAL

## 2018-06-15 ENCOUNTER — APPOINTMENT (OUTPATIENT)
Dept: CARDIAC REHAB | Age: 58
End: 2018-06-15
Payer: COMMERCIAL

## 2018-06-18 ENCOUNTER — APPOINTMENT (OUTPATIENT)
Dept: CARDIAC REHAB | Age: 58
End: 2018-06-18
Payer: COMMERCIAL

## 2018-06-20 ENCOUNTER — APPOINTMENT (OUTPATIENT)
Dept: CARDIAC REHAB | Age: 58
End: 2018-06-20
Payer: COMMERCIAL

## 2018-06-21 ENCOUNTER — APPOINTMENT (OUTPATIENT)
Dept: CARDIAC REHAB | Age: 58
End: 2018-06-21
Payer: COMMERCIAL

## 2018-06-25 ENCOUNTER — APPOINTMENT (OUTPATIENT)
Dept: CARDIAC REHAB | Age: 58
End: 2018-06-25
Payer: COMMERCIAL

## 2018-06-27 ENCOUNTER — HOSPITAL ENCOUNTER (OUTPATIENT)
Dept: CARDIAC REHAB | Age: 58
Discharge: HOME | End: 2018-06-27
Payer: COMMERCIAL

## 2018-06-27 PROCEDURE — 93798 PHYS/QHP OP CAR RHAB W/ECG: CPT

## 2018-06-28 ENCOUNTER — APPOINTMENT (OUTPATIENT)
Dept: CARDIAC REHAB | Age: 58
End: 2018-06-28
Payer: COMMERCIAL

## 2018-06-29 ENCOUNTER — HOSPITAL ENCOUNTER (OUTPATIENT)
Dept: CARDIAC REHAB | Age: 58
Discharge: HOME | End: 2018-06-29
Payer: COMMERCIAL

## 2018-06-29 PROCEDURE — 93798 PHYS/QHP OP CAR RHAB W/ECG: CPT

## 2018-07-02 ENCOUNTER — HOSPITAL ENCOUNTER (OUTPATIENT)
Dept: CARDIAC REHAB | Age: 58
Discharge: HOME | End: 2018-07-02
Payer: COMMERCIAL

## 2018-07-02 PROCEDURE — 93798 PHYS/QHP OP CAR RHAB W/ECG: CPT

## 2018-07-05 ENCOUNTER — HOSPITAL ENCOUNTER (OUTPATIENT)
Dept: CARDIAC REHAB | Age: 58
Discharge: HOME | End: 2018-07-05
Payer: COMMERCIAL

## 2018-07-05 VITALS — BODY MASS INDEX: 29.84 KG/M2 | WEIGHT: 208 LBS

## 2018-07-05 PROCEDURE — 93798 PHYS/QHP OP CAR RHAB W/ECG: CPT

## 2018-07-06 ENCOUNTER — HOSPITAL ENCOUNTER (OUTPATIENT)
Dept: CARDIAC REHAB | Age: 58
Discharge: HOME | End: 2018-07-06
Payer: COMMERCIAL

## 2018-07-09 ENCOUNTER — APPOINTMENT (OUTPATIENT)
Dept: CARDIAC REHAB | Age: 58
End: 2018-07-09
Payer: COMMERCIAL

## 2018-07-10 ENCOUNTER — APPOINTMENT (OUTPATIENT)
Dept: CARDIAC REHAB | Age: 58
End: 2018-07-10
Payer: COMMERCIAL

## 2018-07-11 ENCOUNTER — HOSPITAL ENCOUNTER (OUTPATIENT)
Dept: CARDIAC REHAB | Age: 58
Discharge: HOME | End: 2018-07-11
Payer: COMMERCIAL

## 2018-07-11 VITALS — BODY MASS INDEX: 29.84 KG/M2 | WEIGHT: 208 LBS

## 2018-07-11 PROCEDURE — 93798 PHYS/QHP OP CAR RHAB W/ECG: CPT

## 2018-07-13 ENCOUNTER — HOSPITAL ENCOUNTER (OUTPATIENT)
Dept: CARDIAC REHAB | Age: 58
Discharge: HOME | End: 2018-07-13
Payer: COMMERCIAL

## 2018-07-13 PROCEDURE — 93798 PHYS/QHP OP CAR RHAB W/ECG: CPT

## 2018-07-16 ENCOUNTER — APPOINTMENT (OUTPATIENT)
Dept: CARDIAC REHAB | Age: 58
End: 2018-07-16
Payer: COMMERCIAL

## 2018-07-18 ENCOUNTER — APPOINTMENT (OUTPATIENT)
Dept: CARDIAC REHAB | Age: 58
End: 2018-07-18
Payer: COMMERCIAL

## 2018-07-20 ENCOUNTER — APPOINTMENT (OUTPATIENT)
Dept: CARDIAC REHAB | Age: 58
End: 2018-07-20
Payer: COMMERCIAL

## 2018-07-23 ENCOUNTER — HOSPITAL ENCOUNTER (OUTPATIENT)
Dept: CARDIAC REHAB | Age: 58
Discharge: HOME | End: 2018-07-23
Payer: COMMERCIAL

## 2018-07-23 PROCEDURE — 93798 PHYS/QHP OP CAR RHAB W/ECG: CPT

## 2018-07-27 ENCOUNTER — HOSPITAL ENCOUNTER (OUTPATIENT)
Dept: CARDIAC REHAB | Age: 58
End: 2018-07-27
Payer: COMMERCIAL

## 2018-07-31 ENCOUNTER — HOSPITAL ENCOUNTER (OUTPATIENT)
Dept: LAB | Age: 58
Discharge: HOME | End: 2018-07-31

## 2018-07-31 PROCEDURE — 88305 TISSUE EXAM BY PATHOLOGIST: CPT | Performed by: INTERNAL MEDICINE

## 2018-08-09 ENCOUNTER — APPOINTMENT (OUTPATIENT)
Dept: CARDIAC REHAB | Age: 58
End: 2018-08-09

## 2018-08-16 ENCOUNTER — HOSPITAL ENCOUNTER (OUTPATIENT)
Dept: CARDIAC REHAB | Age: 58
End: 2018-08-16

## 2021-08-10 PROBLEM — I65.22 ASYMPTOMATIC STENOSIS OF LEFT CAROTID ARTERY: Status: ACTIVE | Noted: 2021-08-10

## 2021-08-10 PROBLEM — Z98.890 H/O CAROTID ENDARTERECTOMY: Status: ACTIVE | Noted: 2021-08-10

## 2022-03-18 PROBLEM — I25.10 CORONARY ARTERY DISEASE INVOLVING NATIVE CORONARY ARTERY OF NATIVE HEART: Status: ACTIVE | Noted: 2018-01-18

## 2022-03-18 PROBLEM — I73.9 CLAUDICATION OF LEFT LOWER EXTREMITY (HCC): Status: ACTIVE | Noted: 2018-01-19

## 2022-03-18 PROBLEM — Z98.890 H/O CAROTID ENDARTERECTOMY: Status: ACTIVE | Noted: 2021-08-10

## 2022-03-18 PROBLEM — Z95.2 S/P AVR (AORTIC VALVE REPLACEMENT): Status: ACTIVE | Noted: 2018-03-07

## 2022-03-18 PROBLEM — Z95.1 STATUS POST CORONARY ARTERY BYPASS GRAFT: Status: ACTIVE | Noted: 2018-03-07

## 2022-03-19 PROBLEM — I35.0 NONRHEUMATIC AORTIC VALVE STENOSIS: Status: ACTIVE | Noted: 2018-01-20

## 2022-03-19 PROBLEM — I65.21 ASYMPTOMATIC CAROTID ARTERY STENOSIS, RIGHT: Status: ACTIVE | Noted: 2018-03-08

## 2022-03-19 PROBLEM — I21.02 STEMI INVOLVING LEFT ANTERIOR DESCENDING CORONARY ARTERY (HCC): Status: ACTIVE | Noted: 2018-01-18

## 2022-03-19 PROBLEM — I21.3 STEMI (ST ELEVATION MYOCARDIAL INFARCTION) (HCC): Status: ACTIVE | Noted: 2018-01-18

## 2022-03-19 PROBLEM — E78.5 DYSLIPIDEMIA: Status: ACTIVE | Noted: 2018-01-18

## 2022-03-19 PROBLEM — I21.09 MYOCARDIAL INFARCTION OF ANTERIOR WALL (HCC): Status: ACTIVE | Noted: 2018-03-08

## 2022-03-19 PROBLEM — J93.9 PNEUMOTHORAX, RIGHT: Status: ACTIVE | Noted: 2018-03-09

## 2022-03-19 PROBLEM — D62 POSTOPERATIVE ANEMIA DUE TO ACUTE BLOOD LOSS: Status: ACTIVE | Noted: 2018-03-08

## 2022-03-19 PROBLEM — I50.31 ACUTE DIASTOLIC CHF (CONGESTIVE HEART FAILURE) (HCC): Status: ACTIVE | Noted: 2018-01-19

## 2022-03-19 PROBLEM — Z82.49 FAMILY HISTORY OF PREMATURE CAD: Status: ACTIVE | Noted: 2018-01-19

## 2022-03-19 PROBLEM — I10 ESSENTIAL HYPERTENSION: Status: ACTIVE | Noted: 2018-01-18

## 2022-03-19 PROBLEM — J98.11 ATELECTASIS: Status: ACTIVE | Noted: 2018-03-09

## 2022-03-19 PROBLEM — Z72.0 TOBACCO ABUSE: Status: ACTIVE | Noted: 2018-01-18

## 2022-03-19 PROBLEM — I65.21 CAROTID STENOSIS, RIGHT: Status: ACTIVE | Noted: 2018-04-16

## 2022-03-19 PROBLEM — D69.59 THROMBOCYTOPENIA DUE TO EXTRA CORPOREAL BY-PASS CIRCULATION: Status: ACTIVE | Noted: 2018-03-08

## 2022-03-20 PROBLEM — I65.22 ASYMPTOMATIC STENOSIS OF LEFT CAROTID ARTERY: Status: ACTIVE | Noted: 2021-08-10

## 2022-09-03 ENCOUNTER — OFFICE VISIT (OUTPATIENT)
Dept: URGENT CARE | Facility: CLINIC | Age: 62
End: 2022-09-03
Payer: COMMERCIAL

## 2022-09-03 VITALS
SYSTOLIC BLOOD PRESSURE: 137 MMHG | BODY MASS INDEX: 33.64 KG/M2 | WEIGHT: 235 LBS | HEART RATE: 67 BPM | HEIGHT: 70 IN | DIASTOLIC BLOOD PRESSURE: 82 MMHG | OXYGEN SATURATION: 97 % | RESPIRATION RATE: 20 BRPM | TEMPERATURE: 98.1 F

## 2022-09-03 DIAGNOSIS — J01.90 ACUTE SINUSITIS, RECURRENCE NOT SPECIFIED, UNSPECIFIED LOCATION: Primary | ICD-10-CM

## 2022-09-03 LAB
EXP DATE SOLUTION: NORMAL
EXP DATE SWAB: NORMAL
LOT NUMBER SOLUTION: NORMAL
LOT NUMBER SWAB: NORMAL
SARS-COV-2 RNA, POC: NEGATIVE

## 2022-09-03 PROCEDURE — 99213 OFFICE O/P EST LOW 20 MIN: CPT | Performed by: NURSE PRACTITIONER

## 2022-09-03 PROCEDURE — 87635 SARS-COV-2 COVID-19 AMP PRB: CPT | Performed by: NURSE PRACTITIONER

## 2022-09-03 RX ORDER — BROMPHENIRAMINE MALEATE, PSEUDOEPHEDRINE HYDROCHLORIDE, AND DEXTROMETHORPHAN HYDROBROMIDE 2; 30; 10 MG/5ML; MG/5ML; MG/5ML
10 SYRUP ORAL 4 TIMES DAILY PRN
Qty: 280 ML | Refills: 0 | Status: SHIPPED | OUTPATIENT
Start: 2022-09-03 | End: 2022-09-10

## 2022-09-03 RX ORDER — AMOXICILLIN AND CLAVULANATE POTASSIUM 875; 125 MG/1; MG/1
1 TABLET, FILM COATED ORAL 2 TIMES DAILY
Qty: 14 TABLET | Refills: 0 | Status: SHIPPED | OUTPATIENT
Start: 2022-09-03 | End: 2022-09-10

## 2022-09-03 ASSESSMENT — ENCOUNTER SYMPTOMS
SHORTNESS OF BREATH: 0
SORE THROAT: 1
COUGH: 1
RHINORRHEA: 1
WHEEZING: 0

## 2022-09-03 NOTE — PROGRESS NOTES
Bishop Bennett (:  1960) is a 59 y.o. male,Established patient, here for evaluation of the following chief complaint(s):  Cough         ASSESSMENT/PLAN:  1. Acute sinusitis, recurrence not specified, unspecified location  -     AMB POC COVID-19 COV  The following orders have not been finalized:  -     amoxicillin-clavulanate (AUGMENTIN) 875-125 MG per tablet      No follow-ups on file. Subjective   SUBJECTIVE/OBJECTIVE:  Pt presents to urgent care for onset of sinus congestion, pressure, purulent drainage, headaches, sore throat and non-productive cough for the last 48 hours. He states he was treated at his PCP for the Flu last week, and improved several days later. He denies , SOB, difficulty breathing, fever, dizziness, chest pain or change in mental status. Cough  This is a new problem. The current episode started in the past 7 days. The problem has been gradually worsening. The problem occurs every few minutes. The cough is Non-productive. Associated symptoms include headaches, nasal congestion, postnasal drip, rhinorrhea and a sore throat. Pertinent negatives include no chest pain, ear congestion, fever, shortness of breath or wheezing. Nothing aggravates the symptoms. Review of Systems   Constitutional:  Negative for fever. HENT:  Positive for postnasal drip, rhinorrhea and sore throat. Respiratory:  Positive for cough. Negative for shortness of breath and wheezing. Cardiovascular:  Negative for chest pain. Skin: Negative. Neurological:  Positive for headaches. Objective   Physical Exam  Constitutional:       Appearance: Normal appearance. HENT:      Nose: Congestion and rhinorrhea present. Mouth/Throat:      Mouth: Mucous membranes are dry. Pharynx: Posterior oropharyngeal erythema present. Cardiovascular:      Rate and Rhythm: Normal rate and regular rhythm. Pulses: Normal pulses. Heart sounds: Normal heart sounds.    Pulmonary: Effort: Pulmonary effort is normal.      Breath sounds: Normal breath sounds. Musculoskeletal:      Cervical back: Normal range of motion. Skin:     General: Skin is warm and dry. Neurological:      Mental Status: He is alert and oriented to person, place, and time. Psychiatric:         Mood and Affect: Mood normal.         Behavior: Behavior normal.          On this date 9/3/2022 I have spent 20 minutes reviewing previous notes, test results and face to face with the patient discussing the diagnosis and importance of compliance with the treatment plan as well as documenting on the day of the visit. An electronic signature was used to authenticate this note.     --LIN Dixon - CNP

## 2022-10-07 RX ORDER — ATORVASTATIN CALCIUM 10 MG/1
TABLET, FILM COATED ORAL
Qty: 90 TABLET | Refills: 0 | Status: SHIPPED | OUTPATIENT
Start: 2022-10-07

## 2023-01-09 ENCOUNTER — OFFICE VISIT (OUTPATIENT)
Dept: CARDIOLOGY CLINIC | Age: 63
End: 2023-01-09
Payer: COMMERCIAL

## 2023-01-09 VITALS
DIASTOLIC BLOOD PRESSURE: 78 MMHG | SYSTOLIC BLOOD PRESSURE: 140 MMHG | HEART RATE: 89 BPM | HEIGHT: 70 IN | BODY MASS INDEX: 34.65 KG/M2 | WEIGHT: 242 LBS

## 2023-01-09 DIAGNOSIS — I25.10 CORONARY ARTERY DISEASE INVOLVING NATIVE HEART WITHOUT ANGINA PECTORIS, UNSPECIFIED VESSEL OR LESION TYPE: ICD-10-CM

## 2023-01-09 DIAGNOSIS — E78.5 HYPERLIPIDEMIA, UNSPECIFIED HYPERLIPIDEMIA TYPE: ICD-10-CM

## 2023-01-09 DIAGNOSIS — I77.9 BILATERAL CAROTID ARTERY DISEASE, UNSPECIFIED TYPE (HCC): ICD-10-CM

## 2023-01-09 DIAGNOSIS — I10 ESSENTIAL (PRIMARY) HYPERTENSION: ICD-10-CM

## 2023-01-09 DIAGNOSIS — Z95.2 PRESENCE OF PROSTHETIC HEART VALVE: Primary | ICD-10-CM

## 2023-01-09 PROCEDURE — 3077F SYST BP >= 140 MM HG: CPT | Performed by: INTERNAL MEDICINE

## 2023-01-09 PROCEDURE — 3078F DIAST BP <80 MM HG: CPT | Performed by: INTERNAL MEDICINE

## 2023-01-09 PROCEDURE — 93000 ELECTROCARDIOGRAM COMPLETE: CPT | Performed by: INTERNAL MEDICINE

## 2023-01-09 PROCEDURE — 99214 OFFICE O/P EST MOD 30 MIN: CPT | Performed by: INTERNAL MEDICINE

## 2023-01-09 RX ORDER — ATORVASTATIN CALCIUM 10 MG/1
TABLET, FILM COATED ORAL
Qty: 90 TABLET | Refills: 3 | Status: SHIPPED | OUTPATIENT
Start: 2023-01-09

## 2023-01-09 RX ORDER — CARVEDILOL 25 MG/1
25 TABLET ORAL 2 TIMES DAILY
Qty: 180 TABLET | Refills: 3 | Status: SHIPPED | OUTPATIENT
Start: 2023-01-09

## 2023-01-09 RX ORDER — ALIROCUMAB 75 MG/ML
75 INJECTION, SOLUTION SUBCUTANEOUS
Qty: 6 ADJUSTABLE DOSE PRE-FILLED PEN SYRINGE | Refills: 3 | Status: SHIPPED | OUTPATIENT
Start: 2023-01-09

## 2023-01-09 NOTE — PROGRESS NOTES
[address], [address] [place] [address], [address] [place] [address]  PHONE: [phone]    SUBJECTIVE:   Nuvia Gunderson is a 57 y.o. male 1960   seen for a follow up visit regarding the following:     Chief Complaint   Patient presents with    Coronary Artery Disease    Hypertension    Annual Exam           History of present illness: 57 y.o. male presented for follow-up 1/9/23 history of coronary artery bypass grafting AVR 2018. History of right carotid endarterectomy. Initiated on reppatha but had severe joint and muscle pain. Interval Hx:   Previous history of ST-elevated myocardial infarction treated with bare metal stent. He had subsequent three vessel coronary artery bypass grafting and aortic valve replacement. Hx  right carotid endarterectomy by Dr. Kaila Arredondo doing well      Cardiac History:     STEMI 01/2018, PCI to LAD with bare metal stent. Echocardiogram 01/2018 - EF less than 35%, moderate aortic stenosis, grade 3 diastolic dysfunction. 03/02/2018 post ST-elevated myocardial infarction, limited EF 50-55%. 03/2018 coronary artery bypass grafting with LIMA to LAD, SVG to PDA, SVG to indeterminate vessel, TANNER to LAD 23 mm Christiansen Bovine pericardial valve placement. Lisinopril changed to Diovan due to cough 6/12/2018 8/27/2019 EKG sinus rhythm with RBBB    7/23/2020 sinus  Rhythm Right bundle branch block. ABNORMAL   1/9/2023 EKG sinus rhythm right bundle branch block          Assessment and Plan:     HPL    Initiated Repatha due to inadequate control of lipids on max dose statin therapy. Francisco attempt plauent     Coronary artery disease. Continue current therapies, status post coronary artery bypass surgery with stable symptoms. Hypertension  History of cardiomyopathy. Improved left ventricular systolic function. Continue long acting beta-blocker, ARB. Aortic stenosis, status post AVR. Valve replacement 2018    Carotid disease. Stable last ultrasound 2021 reviewed          Current Outpatient Medications   Medication Sig    carvedilol (COREG) 25 MG tablet Take 1 tablet by mouth 2 times daily TAKE ONE TABLET BY MOUTH TWICE A DAY    atorvastatin (LIPITOR) 10 MG tablet TAKE ONE TABLET BY MOUTH DAILY    alirocumab (PRALUENT) 75 MG/ML SOAJ injection pen Inject 1 mL into the skin every 14 days    acetaminophen (TYLENOL) 325 MG tablet Over the counter medicine, take for pain as on label if needed    aspirin 81 MG EC tablet Take 81 mg by mouth daily    losartan (COZAAR) 25 MG tablet TAKE 1 TABLET BY MOUTH DAILY    nitroGLYCERIN (NITROSTAT) 0.4 MG SL tablet Place 0.4 mg under the tongue    sertraline (ZOLOFT) 25 MG tablet TAKE ONE TABLET BY MOUTH DAILY     No current facility-administered medications for this visit. Past Medical History, Past Surgical History, Family history, Social History, and Medications were all reviewed with the patient today and updated as necessary. Allergies   Allergen Reactions    Adhesive Tape Rash     Patient states skin gets irritated when adhesive is left on him for a period of time. Patient states skin gets irritated when adhesive is left on him for a period of time.         Past Medical History:   Diagnosis Date    Acute diastolic CHF (congestive heart failure) ([facility] [address] 1/19/2018    Adverse effect of anesthesia 2003 in [address]    with ankle surg--- pt states woke up during surg and had to have a E.T.  PLACED DUE TO HE \"WAS MOVING ABOUT\"    Asymptomatic carotid artery stenosis, right 3/8/2018    Claudication of left lower extremity ([facility] [address] 1/19/2018    Coronary artery disease involving native coronary artery of native heart 01/18/2018    mi 1/18/18-- stents x 2 placed--- -- followed by dr Letty Dorantes    Dyslipidemia 01/18/2018    Essential hypertension 01/18/2018    controlled with med    Family history of premature CAD 1/19/2018    GERD (gastroesophageal reflux disease)     Ill-defined condition     wearing life vest---     Snores     per wife-- none since stent placed     STEMI involving left anterior descending coronary artery ([facility] [address] 2018    Tobacco abuse 2018     Past Surgical History:   Procedure Laterality Date    CAROTID ENDARTERECTOMY Right     ORTHOPEDIC SURGERY Right 2003    ankle surg with pinning    ME UNLISTED PROCEDURE CARDIAC SURGERY  2018    cath with stents    VASCULAR SURGERY      cabg-3, aortic valve     Family History   Problem Relation Age of Onset    Coronary Art Dis Brother         CAD/PTCA stent age 46s    Heart Disease Paternal Grandfather     Heart Disease Paternal Grandmother     Heart Disease Maternal Uncle     Cancer Father     Heart Disease Mother     Coronary Art Dis Mother         CABG Age 72      Social History     Tobacco Use    Smoking status: Former     Packs/day: 1.00     Types: Cigarettes     Quit date: 2018     Years since quittin.9    Smokeless tobacco: Never   Substance Use Topics    Alcohol use: Yes       ROS:    ROS        PHYSICAL EXAM:    BP (!) 140/78   Pulse 89   Ht 5' 10\" (1.778 m)   Wt 242 lb (109.8 kg)   BMI 34.72 kg/m²        Wt Readings from Last 3 Encounters:   23 242 lb (109.8 kg)   22 235 lb (106.6 kg)   21 239 lb (108.4 kg)     BP Readings from Last 3 Encounters:   23 (!) 140/78   22 137/82   21 130/70         Physical Exam    Medical problems and test results were reviewed with the patient today. No results found for this or any previous visit (from the past 672 hour(s)). No results found for: CHOL, CHOLPOCT, CHOLX, CHLST, CHOLV, HDL, HDLPOC, HDLC, LDL, LDLC, VLDLC, VLDL, TGLX, TRIGL    No results found for any visits on 23.         Quita Eckert was seen today for coronary artery disease, hypertension and annual exam.    Diagnoses and all orders for this visit:    Presence of prosthetic heart valve  -     EKG 12 Lead  -     Transthoracic echocardiogram (TTE) complete with contrast, bubble, strain, and 3D PRN; Future    Essential (primary) hypertension  -     EKG 12 Lead    Hyperlipidemia, unspecified hyperlipidemia type  -     EKG 12 Lead    Coronary artery disease involving native heart without angina pectoris, unspecified vessel or lesion type  -     EKG 12 Lead    Bilateral carotid artery disease, unspecified type (HCC)  -     Leigh Diaz MD, Vascular Surgery, [place]    Other orders  -     carvedilol (COREG) 25 MG tablet; Take 1 tablet by mouth 2 times daily TAKE ONE TABLET BY MOUTH TWICE A DAY  -     atorvastatin (LIPITOR) 10 MG tablet; TAKE ONE TABLET BY MOUTH DAILY  -     Discontinue: Alirocumab 150 MG/ML SOAJ; Inject 150 mg into the skin every 14 days  -     alirocumab (PRALUENT) 75 MG/ML SOAJ injection pen; Inject 1 mL into the skin every 14 days    Return in about 1 year (around 1/9/2024).        Fariba Miner MD  1/9/2023  11:20 AM

## 2023-01-12 ENCOUNTER — CLINICAL DOCUMENTATION (OUTPATIENT)
Dept: CARDIOLOGY CLINIC | Age: 63
End: 2023-01-12

## 2023-01-17 ENCOUNTER — OFFICE VISIT (OUTPATIENT)
Dept: VASCULAR SURGERY | Age: 63
End: 2023-01-17
Payer: COMMERCIAL

## 2023-01-17 VITALS
WEIGHT: 247 LBS | BODY MASS INDEX: 35.36 KG/M2 | SYSTOLIC BLOOD PRESSURE: 124 MMHG | DIASTOLIC BLOOD PRESSURE: 76 MMHG | OXYGEN SATURATION: 94 % | HEIGHT: 70 IN | HEART RATE: 74 BPM

## 2023-01-17 DIAGNOSIS — I65.21 ASYMPTOMATIC CAROTID ARTERY STENOSIS, RIGHT: ICD-10-CM

## 2023-01-17 DIAGNOSIS — Z98.890 H/O CAROTID ENDARTERECTOMY: ICD-10-CM

## 2023-01-17 DIAGNOSIS — I65.22 ASYMPTOMATIC STENOSIS OF LEFT CAROTID ARTERY: Primary | ICD-10-CM

## 2023-01-17 PROCEDURE — 3074F SYST BP LT 130 MM HG: CPT | Performed by: NURSE PRACTITIONER

## 2023-01-17 PROCEDURE — 3078F DIAST BP <80 MM HG: CPT | Performed by: NURSE PRACTITIONER

## 2023-01-17 PROCEDURE — 99213 OFFICE O/P EST LOW 20 MIN: CPT | Performed by: NURSE PRACTITIONER

## 2023-01-17 NOTE — PROGRESS NOTES
DATE OF VISIT: 1/17/2023      [facility]  Osvaldo Miller is a 57 y.o. male is here in follow up for his carotid duplex study. He denies any new lateralizing neurological symptoms. He has undergone a right carotid endarterectomy. He remains on aspirin and atorvastatin. MEDICAL HISTORY:   Past Medical History:   Diagnosis Date    Acute diastolic CHF (congestive heart failure) ([address] 1/19/2018    Adverse effect of anesthesia 2003 in [address]    with ankle surg--- pt states woke up during surg and had to have a E.T.  PLACED DUE TO HE \"WAS MOVING ABOUT\"    Asymptomatic carotid artery stenosis, right 3/8/2018    Claudication of left lower extremity ([address] 1/19/2018    Coronary artery disease involving native coronary artery of native heart 01/18/2018    mi 1/18/18-- stents x 2 placed--- -- followed by dr Jacob Hightower    Dyslipidemia 01/18/2018    Essential hypertension 01/18/2018    controlled with med    Family history of premature CAD 1/19/2018    GERD (gastroesophageal reflux disease)     Ill-defined condition     wearing life vest---     Snores     per wife-- none since stent placed     STEMI involving left anterior descending coronary artery ([address] 1/18/2018    Tobacco abuse 1/18/2018         SURGICAL HISTORY:   Past Surgical History:   Procedure Laterality Date    CAROTID ENDARTERECTOMY Right     ORTHOPEDIC SURGERY Right 01/2003    ankle surg with pinning    NE UNLISTED PROCEDURE CARDIAC SURGERY  01/18/2018    cath with stents    VASCULAR SURGERY      cabg-3, aortic valve       Review of Systems:  Constitutional:   Negative for fevers and unexplained weight loss. Respiratory:   Negative for hemoptysis. Cardiovascular:   Negative except as noted in HPI. Musculoskeletal:  Negative for active, unexplained/severe joint pain. ALLERGIES:   Allergies   Allergen Reactions    Adhesive Tape Rash     Patient states skin gets irritated when adhesive is left on him for a period of time.    Patient states skin gets irritated when adhesive is left on him for a period of time. CURRENT MEDICATIONS:  Current Outpatient Medications   Medication Sig Dispense Refill    atorvastatin (LIPITOR) 10 MG tablet TAKE ONE TABLET BY MOUTH DAILY 90 tablet 3    carvedilol (COREG) 25 MG tablet Take 1 tablet by mouth 2 times daily TAKE ONE TABLET BY MOUTH TWICE A  tablet 3    alirocumab (PRALUENT) 75 MG/ML SOAJ injection pen Inject 1 mL into the skin every 14 days 6 Adjustable Dose Pre-filled Pen Syringe 3    acetaminophen (TYLENOL) 325 MG tablet Over the counter medicine, take for pain as on label if needed      aspirin 81 MG EC tablet Take 81 mg by mouth daily      losartan (COZAAR) 25 MG tablet TAKE 1 TABLET BY MOUTH DAILY      nitroGLYCERIN (NITROSTAT) 0.4 MG SL tablet Place 0.4 mg under the tongue      sertraline (ZOLOFT) 25 MG tablet TAKE ONE TABLET BY MOUTH DAILY      atorvastatin (LIPITOR) 10 MG tablet TAKE ONE TABLET BY MOUTH DAILY (Patient not taking: Reported on 1/17/2023) 90 tablet 3     No current facility-administered medications for this visit. IMAGING: Interpretation Summary         No stenosis in the right internal carotid artery. Mild (<50%) stenosis in the left internal carotid artery. Normal antegrade flow involving the right vertebral artery. Normal antegrade flow involving the left vertebral artery. Procedure Details    A gray scale, color Doppler imaging and spectral Doppler analysis ultrasound was performed. During the study longitudinal views were obtained. Pulsed wave doppler was performed. Overall the study quality was good. Cerebrovascular Findings    Right Carotid    Patient is s/p carotid endarterectomy. Right arm BP: 124 mmHg. Internal Carotid Artery: No stenosis. Vertebral Artery: Flow is antegrade. Left Carotid    Left arm BP: 138 mmHg. Carotid Bulb: Calcific plaque. Internal Carotid Artery: Mild (<50%) stenosis. Vertebral Artery: Flow is antegrade. Carotid Measurements     Right PSV Right EDV Left PSV Left EDV   CCA Prox 144 cm/s        14.2 cm/s        133 cm/s        25.4 cm/s          CCA Dist 82.8 cm/s        20.7 cm/s        122 cm/s        24.9 cm/s          ICA Prox 82.2 cm/s        9.6 cm/s        120 cm/s        40.6 cm/s          ICA Mid 69.6 cm/s        16.8 cm/s        108 cm/s        29.1 cm/s          ICA Dist 109 cm/s        27.9 cm/s        84.3 cm/s        20.7 cm/s           cm/s        16.3 cm/s        313 cm/s        36 cm/s          Vertebral 66.5 cm/s        19.3 cm/s        78 cm/s        15 cm/s            Arterial Pressure Measurements     Right Left   Brachial  mmHg        138 mmHg                                    Procedure Staff    Technologist/Clinician: Karen Reid  Supporting Staff: None  Performing Physician/Midlevel: None     Exam Completion Date/Time: 1/17/23  1:24 PM      PHYSICAL EXAM  VITALS: /76 (Site: Right Upper Arm, Position: Sitting, Cuff Size: Medium Adult)   Pulse 74   Ht 5' 10\" (1.778 m)   Wt 247 lb (112 kg)   SpO2 94%   BMI 35.44 kg/m²       GENERAL: Well developed, well nourished, in NAD  HEAD/NECK: normocephalic, atraumatic, neck supple  MUSCULOSKELETAL: normal gait  NEURO: sensation and strength grossly intact and symmetrical  PSYCH: alert and oriented to person, place and time      Assessment/Plan: Patient is a 39-year-old male he follows up for his yearly carotid duplex. No new lateralizing neurological symptoms. He is to remain on his aspirin  and atorvastatin. He will return in 1 year for continued ultrasound surveillance. He is to present to the emergency department with any S/S of a stroke i.e. slurred speech, facial drooping, amaurosis fugax or unilateral weakness.         LIN Richard - CNP    20 minutes of time was spent on this encounter including chart review, assessment and evaluation

## 2023-07-26 RX ORDER — LOSARTAN POTASSIUM 25 MG/1
TABLET ORAL
Qty: 90 TABLET | Refills: 3 | Status: SHIPPED | OUTPATIENT
Start: 2023-07-26

## 2023-08-11 RX ORDER — CARVEDILOL 25 MG/1
25 TABLET ORAL 2 TIMES DAILY
Qty: 180 TABLET | Refills: 3 | Status: SHIPPED | OUTPATIENT
Start: 2023-08-11

## 2023-11-09 NOTE — TELEPHONE ENCOUNTER
Called pt and let him know that Dr. Altagracia Smart is currently out of the office and that we can review the rx for refills on Monday. Let pt know that he can reach out to his primary care doctor to receive refills on this medication as well. Pt voiced understanding.

## 2023-11-09 NOTE — TELEPHONE ENCOUNTER
MEDICATION REFILL REQUEST      Name of Medication:  Sertraline  Dose:  25 mg  Frequency:  QD  Quantity:  30  Days' supply:  30 with refills      Pharmacy Name/Location:  [phone]  Pt is out please call in

## 2023-11-13 RX ORDER — SERTRALINE HYDROCHLORIDE 25 MG/1
25 TABLET, FILM COATED ORAL DAILY
Qty: 30 TABLET | Refills: 1 | Status: SHIPPED | OUTPATIENT
Start: 2023-11-13

## 2023-11-13 NOTE — TELEPHONE ENCOUNTER
Requested Prescriptions     Pending Prescriptions Disp Refills    sertraline (ZOLOFT) 25 MG tablet 30 tablet 1     Sig: Take 1 tablet by mouth daily TAKE ONE TABLET BY MOUTH DAILY

## 2024-01-09 NOTE — TELEPHONE ENCOUNTER
Requested Prescriptions     Pending Prescriptions Disp Refills    sertraline (ZOLOFT) 25 MG tablet [Pharmacy Med Name: SERTRALINE 25 MG TAB[*]] 30 tablet 1     Sig: TAKE ONE TABLET BY MOUTH ONE TIME DAILY

## 2024-01-10 RX ORDER — SERTRALINE HYDROCHLORIDE 25 MG/1
25 TABLET, FILM COATED ORAL DAILY
Qty: 30 TABLET | Refills: 1 | Status: SHIPPED | OUTPATIENT
Start: 2024-01-10

## 2024-01-10 RX ORDER — ATORVASTATIN CALCIUM 10 MG/1
10 TABLET, FILM COATED ORAL DAILY
Qty: 90 TABLET | Refills: 3 | Status: SHIPPED | OUTPATIENT
Start: 2024-01-10

## 2024-01-10 NOTE — TELEPHONE ENCOUNTER
Prescriptions sent to [email or web address]  Requested Prescriptions     Signed Prescriptions Disp Refills    atorvastatin (LIPITOR) 10 MG tablet 90 tablet 3     Sig: Take 1 tablet by mouth daily     Authorizing Provider: GENEVIEVE SNYDER     Ordering User: CAREY WATERS    sertraline (ZOLOFT) 25 MG tablet 30 tablet 1     Sig: Take 1 tablet by mouth daily TAKE ONE TABLET BY MOUTH DAILY     Authorizing Provider: GENEVIEVE SNYDER     Ordering User: CAREY WATERS

## 2024-01-31 ENCOUNTER — OFFICE VISIT (OUTPATIENT)
Age: 64
End: 2024-01-31
Payer: MEDICARE

## 2024-01-31 VITALS
SYSTOLIC BLOOD PRESSURE: 138 MMHG | DIASTOLIC BLOOD PRESSURE: 76 MMHG | HEIGHT: 70 IN | WEIGHT: 254.8 LBS | HEART RATE: 82 BPM | BODY MASS INDEX: 36.48 KG/M2

## 2024-01-31 DIAGNOSIS — I25.10 CORONARY ARTERY DISEASE INVOLVING NATIVE CORONARY ARTERY OF NATIVE HEART WITHOUT ANGINA PECTORIS: Primary | ICD-10-CM

## 2024-01-31 DIAGNOSIS — I10 ESSENTIAL (PRIMARY) HYPERTENSION: ICD-10-CM

## 2024-01-31 DIAGNOSIS — Z95.2 PRESENCE OF PROSTHETIC HEART VALVE: ICD-10-CM

## 2024-01-31 PROCEDURE — 3075F SYST BP GE 130 - 139MM HG: CPT | Performed by: INTERNAL MEDICINE

## 2024-01-31 PROCEDURE — 93000 ELECTROCARDIOGRAM COMPLETE: CPT | Performed by: INTERNAL MEDICINE

## 2024-01-31 PROCEDURE — 1123F ACP DISCUSS/DSCN MKR DOCD: CPT | Performed by: INTERNAL MEDICINE

## 2024-01-31 PROCEDURE — 99214 OFFICE O/P EST MOD 30 MIN: CPT | Performed by: INTERNAL MEDICINE

## 2024-01-31 PROCEDURE — 3078F DIAST BP <80 MM HG: CPT | Performed by: INTERNAL MEDICINE

## 2024-01-31 RX ORDER — SEMAGLUTIDE 0.68 MG/ML
INJECTION, SOLUTION SUBCUTANEOUS
COMMUNITY
Start: 2024-01-11

## 2024-01-31 ASSESSMENT — ENCOUNTER SYMPTOMS
NAIL CHANGES: 0
STRIDOR: 0
ABDOMINAL PAIN: 0
COUGH: 0
EYE PAIN: 0
APHONIA: 0

## 2024-01-31 NOTE — PROGRESS NOTES
prescription.    Found prior authorization for another prescription for the same medication: Closed - Prior Authorization not required for patient/medication    atorvastatin (LIPITOR) 10 MG tablet (Taking)    Sig - Route: Take 1 tablet by mouth daily - Oral    carvedilol (COREG) 25 MG tablet (Taking)    Sig - Route: Take 1 tablet by mouth 2 times daily TAKE ONE TABLET BY MOUTH TWICE A DAY - Oral    losartan (COZAAR) 25 MG tablet (Taking)    Sig: TAKE ONE TABLET BY MOUTH DAILY    atorvastatin (LIPITOR) 10 MG tablet    Sig: TAKE ONE TABLET BY MOUTH DAILY          History of cardiomyopathy.      Improved left ventricular systolic function.  Continue long acting beta-blocker, ARB.   Aortic stenosis, status post AVR.     Valve replacement 2018  Echo planned     Carotid disease.      Stable last ultrasound 2021 reviewed   RBBB  Controlled          Current Outpatient Medications   Medication Sig    OZEMPIC, 0.25 OR 0.5 MG/DOSE, 2 MG/3ML SOPN     Alirocumab 150 MG/ML SOAJ Inject 150 mg into the skin every 14 days    sertraline (ZOLOFT) 25 MG tablet TAKE ONE TABLET BY MOUTH ONE TIME DAILY    atorvastatin (LIPITOR) 10 MG tablet Take 1 tablet by mouth daily    carvedilol (COREG) 25 MG tablet Take 1 tablet by mouth 2 times daily TAKE ONE TABLET BY MOUTH TWICE A DAY    losartan (COZAAR) 25 MG tablet TAKE ONE TABLET BY MOUTH DAILY    acetaminophen (TYLENOL) 325 MG tablet Over the counter medicine, take for pain as on label if needed    aspirin 81 MG EC tablet Take 1 tablet by mouth daily    nitroGLYCERIN (NITROSTAT) 0.4 MG SL tablet Place 1 tablet under the tongue    sertraline (ZOLOFT) 25 MG tablet Take 1 tablet by mouth daily TAKE ONE TABLET BY MOUTH DAILY    atorvastatin (LIPITOR) 10 MG tablet TAKE ONE TABLET BY MOUTH DAILY     No current facility-administered medications for this visit.       Past Medical History, Past Surgical History, Family history, Social History, and Medications were all reviewed with the patient today

## 2024-03-04 ENCOUNTER — CLINICAL DOCUMENTATION (OUTPATIENT)
Age: 64
End: 2024-03-04

## 2024-03-08 NOTE — PROGRESS NOTES
Denied on March 6  Your request has been denied.    Spoke with Perla from [facility]. Appealed Praluent denial via phone. Case # [id number].

## 2024-03-22 DIAGNOSIS — E78.5 DYSLIPIDEMIA: ICD-10-CM

## 2024-03-22 DIAGNOSIS — I21.3 STEMI (ST ELEVATION MYOCARDIAL INFARCTION) (HCC): Primary | ICD-10-CM

## 2024-03-22 NOTE — TELEPHONE ENCOUNTER
MEDICATION REFILL REQUEST      Name of Medication:  Praluent  Dose:  . 75 mg  Frequency:  ?  Quantity:  ?  Days' supply:  ?      Pharmacy Name/Location:  [phone]    Pt did not leave how many he takes in a day on his message ,Please call to confirm   Please sign

## 2024-03-22 NOTE — TELEPHONE ENCOUNTER
Requested Prescriptions     Pending Prescriptions Disp Refills    Alirocumab 150 MG/ML SOAJ 2 Adjustable Dose Pre-filled Pen Syringe 5     Sig: Inject 150 mg into the skin every 14 days

## 2024-05-13 RX ORDER — SERTRALINE HYDROCHLORIDE 25 MG/1
25 TABLET, FILM COATED ORAL DAILY
Qty: 30 TABLET | Refills: 1 | Status: CANCELLED | OUTPATIENT
Start: 2024-05-13

## 2024-05-16 NOTE — TELEPHONE ENCOUNTER
Requested Prescriptions     Pending Prescriptions Disp Refills    sertraline (ZOLOFT) 25 MG tablet 90 tablet 3     Sig: Take 1 tablet by mouth daily TAKE ONE TABLET BY MOUTH DAILY     Verified rx. Last OV 01/31/2024. Erx to pharm on file.

## 2024-05-17 RX ORDER — SERTRALINE HYDROCHLORIDE 25 MG/1
25 TABLET, FILM COATED ORAL DAILY
Qty: 90 TABLET | Refills: 3 | Status: SHIPPED | OUTPATIENT
Start: 2024-05-17

## 2024-06-07 ENCOUNTER — CLINICAL DOCUMENTATION (OUTPATIENT)
Age: 64
End: 2024-06-07

## 2024-06-07 NOTE — PROGRESS NOTES
Repatha SureClick 140MG/ML auto-injectors    PA submitted.    Your request has been approved. Authorization Expiration Date: December 31, 2024.

## 2024-08-09 RX ORDER — LOSARTAN POTASSIUM 25 MG/1
25 TABLET ORAL DAILY
Qty: 90 TABLET | Refills: 3 | Status: SHIPPED | OUTPATIENT
Start: 2024-08-09

## 2024-08-09 NOTE — TELEPHONE ENCOUNTER
Requested Prescriptions     Pending Prescriptions Disp Refills    losartan (COZAAR) 25 MG tablet 90 tablet 3     Sig: Take 1 tablet by mouth daily     Verified rx in last OV date 01/31/24. Pharmacy confirmed. Erx as requested.

## 2024-08-26 ENCOUNTER — PATIENT MESSAGE (OUTPATIENT)
Age: 64
End: 2024-08-26

## 2024-08-26 DIAGNOSIS — E78.5 DYSLIPIDEMIA: ICD-10-CM

## 2024-08-26 DIAGNOSIS — I25.10 CORONARY ARTERY DISEASE INVOLVING NATIVE CORONARY ARTERY OF NATIVE HEART: Primary | ICD-10-CM

## 2024-08-26 NOTE — TELEPHONE ENCOUNTER
Requested Prescriptions     Pending Prescriptions Disp Refills    Evolocumab 140 MG/ML SOAJ 2 Adjustable Dose Pre-filled Pen Syringe 5     Sig: Inject 1 pen  into the skin every 14 days

## 2024-12-16 RX ORDER — ATORVASTATIN CALCIUM 10 MG/1
10 TABLET, FILM COATED ORAL DAILY
Qty: 90 TABLET | Refills: 3 | Status: SHIPPED | OUTPATIENT
Start: 2024-12-16

## 2024-12-16 NOTE — TELEPHONE ENCOUNTER
Requested Prescriptions     Pending Prescriptions Disp Refills    atorvastatin (LIPITOR) 10 MG tablet 90 tablet 3     Sig: Take 1 tablet by mouth daily        Verified rx. Last OV 1/31/24. Erx to pharm on file. Pt has an appt on 2/4/25

## 2025-01-27 DIAGNOSIS — I10 ESSENTIAL (PRIMARY) HYPERTENSION: Primary | ICD-10-CM

## 2025-01-27 RX ORDER — SERTRALINE HYDROCHLORIDE 25 MG/1
25 TABLET, FILM COATED ORAL DAILY
Qty: 30 TABLET | Refills: 0 | Status: SHIPPED | OUTPATIENT
Start: 2025-01-27

## 2025-01-27 NOTE — TELEPHONE ENCOUNTER
Requested Prescriptions     Pending Prescriptions Disp Refills    sertraline (ZOLOFT) 25 MG tablet 30 tablet 0     Sig: Take 1 tablet by mouth daily TAKE ONE TABLET BY MOUTH DAILY        Refill verified last OV 01/31/2024

## 2025-02-04 ENCOUNTER — OFFICE VISIT (OUTPATIENT)
Age: 65
End: 2025-02-04
Payer: COMMERCIAL

## 2025-02-04 VITALS
HEART RATE: 77 BPM | DIASTOLIC BLOOD PRESSURE: 80 MMHG | SYSTOLIC BLOOD PRESSURE: 138 MMHG | HEIGHT: 70 IN | BODY MASS INDEX: 36.51 KG/M2 | WEIGHT: 255 LBS

## 2025-02-04 DIAGNOSIS — E08.65 DIABETES MELLITUS DUE TO UNDERLYING CONDITION WITH HYPERGLYCEMIA, WITHOUT LONG-TERM CURRENT USE OF INSULIN (HCC): ICD-10-CM

## 2025-02-04 DIAGNOSIS — Z95.2 S/P AVR: ICD-10-CM

## 2025-02-04 DIAGNOSIS — I25.10 ASCVD (ARTERIOSCLEROTIC CARDIOVASCULAR DISEASE): ICD-10-CM

## 2025-02-04 DIAGNOSIS — I10 ESSENTIAL (PRIMARY) HYPERTENSION: Primary | ICD-10-CM

## 2025-02-04 DIAGNOSIS — I50.20 HFREF (HEART FAILURE WITH REDUCED EJECTION FRACTION) (HCC): ICD-10-CM

## 2025-02-04 DIAGNOSIS — R01.1 HEART MURMUR: ICD-10-CM

## 2025-02-04 DIAGNOSIS — E78.5 HYPERLIPIDEMIA LDL GOAL <70: ICD-10-CM

## 2025-02-04 PROCEDURE — 1123F ACP DISCUSS/DSCN MKR DOCD: CPT | Performed by: INTERNAL MEDICINE

## 2025-02-04 PROCEDURE — 1126F AMNT PAIN NOTED NONE PRSNT: CPT | Performed by: INTERNAL MEDICINE

## 2025-02-04 PROCEDURE — 3079F DIAST BP 80-89 MM HG: CPT | Performed by: INTERNAL MEDICINE

## 2025-02-04 PROCEDURE — 93000 ELECTROCARDIOGRAM COMPLETE: CPT | Performed by: INTERNAL MEDICINE

## 2025-02-04 PROCEDURE — 3075F SYST BP GE 130 - 139MM HG: CPT | Performed by: INTERNAL MEDICINE

## 2025-02-04 PROCEDURE — 99214 OFFICE O/P EST MOD 30 MIN: CPT | Performed by: INTERNAL MEDICINE

## 2025-02-04 RX ORDER — NITROGLYCERIN 0.4 MG/1
0.4 TABLET SUBLINGUAL EVERY 5 MIN PRN
Qty: 25 TABLET | Refills: 11 | Status: SHIPPED | OUTPATIENT
Start: 2025-02-04

## 2025-02-04 RX ORDER — SEMAGLUTIDE 0.68 MG/ML
INJECTION, SOLUTION SUBCUTANEOUS
Qty: 3 ML | Refills: 2 | Status: SHIPPED | OUTPATIENT
Start: 2025-02-04 | End: 2025-04-04

## 2025-02-04 RX ORDER — SEMAGLUTIDE 1.34 MG/ML
1 INJECTION, SOLUTION SUBCUTANEOUS
Qty: 3 ML | Refills: 5 | Status: SHIPPED | OUTPATIENT
Start: 2025-03-04

## 2025-02-04 NOTE — PROGRESS NOTES
[address], [facility] [address]  [place] [address]  PHONE: [phone]    SUBJECTIVE:   Michoacano Vivar is a 64 y.o. male 1960   seen for a follow up visit regarding the following:     Chief Complaint   Patient presents with    Coronary Artery Disease    Hypertension         History of Present Illness  The patient is a 64-year-old male with a medical history significant for hyperlipidemia, atherosclerotic cardiovascular disease (ASCVD), hypertension, heart failure with reduced ejection fraction (HFrEF), aortic stenosis, carotid artery disease, right bundle branch block, a history of myocardial infarction (MI), and diabetes mellitus.    The patient reports a general sense of well-being and maintains an active lifestyle with regular physical exercise. He denies experiencing chest pain or any other concerning symptoms. His current medication regimen includes atorvastatin (Lipitor) 10 mg daily and evolocumab (Repatha) 140 mg biweekly.    The patient has been on semaglutide (Ozempic) 0.25 mg since June 2024, resulting in a reduction of his hemoglobin A1c from 6.8% to 5.8%.    MEDICATIONS  Pravachol, Repatha, atorvastatin, carvedilol, losartan, Ozempic        Interval history:  Cardiac History:     STEMI 01/2018, PCI to LAD with bare metal stent.      Echocardiogram 01/2018 - EF less than 35%, moderate aortic stenosis, grade 3 diastolic dysfunction.     03/02/2018 post ST-elevated myocardial infarction, limited EF 50-55%.     03/2018 coronary artery bypass grafting with LIMA to LAD, SVG to PDA, SVG to indeterminate vessel, TANNER to LAD 23 mm Christiansen Bovine pericardial valve placement.      Lisinopril changed to Diovan due to cough 6/12/2018 8/27/2019 EKG sinus rhythm with RBBB    7/23/2020 sinus  Rhythm Right bundle branch block. ABNORMAL   1/9/2023 EKG sinus rhythm right bundle branch block  1/31/2024 EKG sinus rhythm right bundle branch block   2/4/2025 Sinus Rhythm -Right

## 2025-02-09 ENCOUNTER — CLINICAL DOCUMENTATION (OUTPATIENT)
Age: 65
End: 2025-02-09

## 2025-04-23 DIAGNOSIS — I10 ESSENTIAL (PRIMARY) HYPERTENSION: ICD-10-CM

## 2025-04-24 RX ORDER — SERTRALINE HYDROCHLORIDE 25 MG/1
25 TABLET, FILM COATED ORAL DAILY
Qty: 30 TABLET | Refills: 0 | Status: SHIPPED | OUTPATIENT
Start: 2025-04-24

## 2025-04-24 NOTE — TELEPHONE ENCOUNTER
Requested Prescriptions     Pending Prescriptions Disp Refills    sertraline (ZOLOFT) 25 MG tablet 30 tablet 0     Sig: Take 1 tablet by mouth daily TAKE ONE TABLET BY MOUTH DAILY

## 2025-06-05 DIAGNOSIS — I10 ESSENTIAL (PRIMARY) HYPERTENSION: ICD-10-CM

## 2025-06-05 RX ORDER — SERTRALINE HYDROCHLORIDE 25 MG/1
25 TABLET, FILM COATED ORAL DAILY
Qty: 30 TABLET | Refills: 3 | Status: SHIPPED | OUTPATIENT
Start: 2025-06-05

## 2025-06-05 NOTE — TELEPHONE ENCOUNTER
Requested Prescriptions     Pending Prescriptions Disp Refills    sertraline (ZOLOFT) 25 MG tablet 30 tablet 3     Sig: Take 1 tablet by mouth daily TAKE ONE TABLET BY MOUTH DAILY

## 2025-07-28 DIAGNOSIS — E08.65 DIABETES MELLITUS DUE TO UNDERLYING CONDITION WITH HYPERGLYCEMIA, WITHOUT LONG-TERM CURRENT USE OF INSULIN (HCC): ICD-10-CM

## 2025-07-28 RX ORDER — SEMAGLUTIDE 1.34 MG/ML
1 INJECTION, SOLUTION SUBCUTANEOUS
Qty: 3 ML | Refills: 3 | Status: SHIPPED | OUTPATIENT
Start: 2025-07-28

## 2025-07-28 NOTE — TELEPHONE ENCOUNTER
I spoke with the pt, he would like to stay on the 1 mg dose.   Requested Prescriptions     Pending Prescriptions Disp Refills    Semaglutide, 1 MG/DOSE, (OZEMPIC, 1 MG/DOSE,) 4 MG/3ML SOPN sc injection 3 mL 3     Sig: Inject 1 mg into the skin every 7 days

## 2025-07-29 RX ORDER — LOSARTAN POTASSIUM 25 MG/1
25 TABLET ORAL DAILY
Qty: 90 TABLET | Refills: 1 | Status: SHIPPED | OUTPATIENT
Start: 2025-07-29

## 2025-07-29 NOTE — TELEPHONE ENCOUNTER
Requested Prescriptions     Pending Prescriptions Disp Refills    losartan (COZAAR) 25 MG tablet 90 tablet 1     Sig: Take 1 tablet by mouth daily

## 2025-08-06 RX ORDER — CARVEDILOL 25 MG/1
25 TABLET ORAL 2 TIMES DAILY
Qty: 180 TABLET | Refills: 1 | Status: SHIPPED | OUTPATIENT
Start: 2025-08-06

## 2025-08-06 RX ORDER — LOSARTAN POTASSIUM 25 MG/1
25 TABLET ORAL DAILY
Qty: 90 TABLET | Refills: 1 | Status: SHIPPED | OUTPATIENT
Start: 2025-08-06

## (undated) DEVICE — SUTURE ETHBND EXCEL SZ 3-0 L36IN NONABSORBABLE GRN RB-1 X558H

## (undated) DEVICE — FORCEPS BPLR L210MM TIP L1.5 WRK L105MM STR BAYNT INSUL DISP

## (undated) DEVICE — BLADE SAW W10XL54MM FOR PRI REPEAT STRNOTMY

## (undated) DEVICE — Device

## (undated) DEVICE — SUTURE COAT VCRL SZ 0 L36IN ABSRB VLT CTX L48MM TAPERPOINT J370H

## (undated) DEVICE — SUTURE ABSRB 27IN SZ 4-0 17MM RB-1 1/2 CIR TAPR PNT MFIL U207H

## (undated) DEVICE — DRAPE TWL SURG 16X26IN BLU ORB04] ALLCARE INC]

## (undated) DEVICE — SYR 10ML LUER LOK 1/5ML GRAD --

## (undated) DEVICE — SUTURE PROL SZ 5-0 L24IN NONABSORBABLE BLU L17MM RB-1 1/2 8555H

## (undated) DEVICE — CAROTID ARTERY SHUNT KIT,RADIOPAQUE LINE, STRAIGHT: Brand: ARGYLE

## (undated) DEVICE — CARDINAL HEALTH FLEXIBLE LIGHT HANDLE COVER: Brand: CARDINAL HEALTH

## (undated) DEVICE — SUTURE PERMAHAND SZ 3-0 L18IN NONABSORBABLE BLK SILK BRAID A184H

## (undated) DEVICE — APPLICATOR BNDG 1MM ADH PREMIERPRO EXOFIN

## (undated) DEVICE — SUTURE PERMAHAND SZ 2-0 L12X18IN NONABSORBABLE BLK SILK A185H

## (undated) DEVICE — PLEDGET VASC W3/16XL3/8IN THK1/16IN PTFE SFT

## (undated) DEVICE — BASIC SINGLE BASIN-LF: Brand: MEDLINE INDUSTRIES, INC.

## (undated) DEVICE — TRAY FOLEY 16FR TEMP SENS F400 --

## (undated) DEVICE — APPLIER CLP L9.375IN APER 2.1MM CLS L3.8MM 20 SM TI CLP

## (undated) DEVICE — SUTURE VCRL 3-0 L36IN ABSRB UD CT L40MM 1/2 CIR TAPERPOINT J956H

## (undated) DEVICE — SUT PROL 3-0 36IN SH DA BLU --

## (undated) DEVICE — SUTURE VCRL SZ 4-0 L27IN ABSRB UD L19MM PS-2 3/8 CIR PRIM J426H

## (undated) DEVICE — BIPOLAR FORCEPS CORD,BANANA LEADS: Brand: VALLEYLAB

## (undated) DEVICE — STANDARD HYPODERMIC NEEDLE,POLYPROPYLENE HUB: Brand: MONOJECT

## (undated) DEVICE — LEAD PCMKR MYOCARDL BPLR TEMP. --

## (undated) DEVICE — 2000CC GUARDIAN II: Brand: GUARDIAN

## (undated) DEVICE — (D)PREP SKN CHLRAPRP APPL 26ML -- CONVERT TO ITEM 371833

## (undated) DEVICE — UNIVERSAL DRAPES: Brand: MEDLINE INDUSTRIES, INC.

## (undated) DEVICE — KENDALL SCD EXPRESS SLEEVES, KNEE LENGTH, MEDIUM: Brand: KENDALL SCD

## (undated) DEVICE — SOLUTION LACTATED RINGERS INJECTION USP

## (undated) DEVICE — AORTIC PUNCHES ARE USED TO CREATE A UNIFORM OPENING IN BLOOD VESSELS DURING CARDIOVASCULAR SURGERY. THE VESSEL GRAFT IS INSERTED INTO THE CREATED OPENING AND SUTURED TO THE VESSEL WALL. AORTIC LANCETS ARE USED TO MAKE A SMALL UNIFORM CUT IN A BLOOD VESSEL TO FACILITATE INSERTION OF AN AORTIC PUNCH.  PUNCHES COME IN VARIOUS LENGTHS, DIAMETERS AND TIP CONFIGURATIONS.: Brand: CLEANCUT ROTATING AORTIC PUNCH

## (undated) DEVICE — CATHETER THOR 32FR L23IN PVC 5 EYELET STR ATRAUM

## (undated) DEVICE — MAGNETIC DRAPE: Brand: DEVON

## (undated) DEVICE — REM POLYHESIVE ADULT PATIENT RETURN ELECTRODE: Brand: VALLEYLAB

## (undated) DEVICE — GOWN,PREVENTION PLUS,2XL,ST,22/CS: Brand: MEDLINE

## (undated) DEVICE — Device: Brand: JELCO

## (undated) DEVICE — SUTURE NONABSORBABLE L24IN SZ 7-0 M0-5 BV175-8 EP 24 BLU M8745

## (undated) DEVICE — AMD ANTIMICROBIAL GAUZE SPONGES,12 PLY USP TYPE VII, 0.2% POLYHEXAMETHYLENE BIGUANIDE HCI (PHMB): Brand: CURITY

## (undated) DEVICE — GOWN,REINFORCED,POLY,AURORA,XXLARGE,STR: Brand: MEDLINE

## (undated) DEVICE — MEDI-TRACE CADENCE ADULT, DEFIBRILLATION ELECTRODE -RTS  (10 PR/PK) - ZOLL: Brand: MEDI-TRACE CADENCE

## (undated) DEVICE — AMD ANTIMICROBIAL NON-ADHERENT PAD,0.2% POLYHEXAMETHYLENE BIGUANIDE HCI (PHMB): Brand: TELFA

## (undated) DEVICE — SUTURE MCRYL SZ 4-0 L27IN ABSRB UD L19MM PS-2 1/2 CIR PRIM Y426H

## (undated) DEVICE — GEL US 20GM NONIRRITATING OVERWRAPPED FILE PCH TRNSMIT

## (undated) DEVICE — CATH URETH INTMIT ROB 14FR FUN -- USE ITEM 179521

## (undated) DEVICE — INTENDED USED TO PROTECT, TAG AND HELP LOCATED SUTURES DURING SURGERY: Brand: STERION®SUTURE AID BOOTIES

## (undated) DEVICE — STERILE HOOK LOCK LATEX FREE ELASTIC BANDAGE 6INX5YD: Brand: HOOK LOCK™

## (undated) DEVICE — CATH KT THOR DRY PLEUR-EVAC LF --

## (undated) DEVICE — INTENDED TO BE USED TO OCCLUDE, RETRACT AND IDENTIFY ARTERIES, VEINS, TENDONS AND NERVES IN SURGICAL PROCEDURES: Brand: STERION®  VESSEL LOOP

## (undated) DEVICE — DRAPE SLUSH DISC W44XL66IN ST FOR RND BSIN HUSH SLUSH SYS

## (undated) DEVICE — SUTURE S STL SZ 5 L18IN NONABSORBABLE SIL CCS L48MM 1/2 CIR M653G

## (undated) DEVICE — SUT PROL 4-0 36IN RB1 DA BLU --

## (undated) DEVICE — CLAMP INSERT: Brand: STEALTH® FIBRA® CLAMP INSERT

## (undated) DEVICE — COR-KNOT® QUICK LOAD® SINGLES: Brand: COR-KNOT® QUICK LOAD®

## (undated) DEVICE — INTENDED FOR TISSUE SEPARATION, AND OTHER PROCEDURES THAT REQUIRE A SHARP SURGICAL BLADE TO PUNCTURE OR CUT.: Brand: BARD-PARKER ® STAINLESS STEEL BLADES

## (undated) DEVICE — SUTURE ETHBND EXCEL 2-0 L30IN NONABSORBABLE GRN L26MM SH MX563

## (undated) DEVICE — SOLUTION IV 1000ML 0.9% SOD CHL

## (undated) DEVICE — SUTURE NONABSORBABLE MONOFILAMENT 5-0 C-1 1X24 IN PROLENE 8725H

## (undated) DEVICE — BUTTON SWITCH PENCIL BLADE ELECTRODE, HOLSTER: Brand: EDGE

## (undated) DEVICE — CATHETER THOR 32FR L23IN PVC 6 EYELET STR ATRAUM

## (undated) DEVICE — CANNULA PERF L1.8MM TIP L1MM S STL SHFT BLB SHP TIP FEM

## (undated) DEVICE — SUTURE PROL SZ 7-0 L24IN NONABSORBABLE BLU L9.3MM BV-1 3/8 M8702

## (undated) DEVICE — SUTURE PERMAHAND SZ 0 L30IN NONABSORBABLE BLK L30MM PSL 3/8 590H

## (undated) DEVICE — SUTURE ETHBND EXCEL SZ 0 L18IN NONABSORBABLE GRN L36MM CT-1 CX21D

## (undated) DEVICE — SOLUTION IRRIG 3000ML 0.9% SOD CHL FLX CONT 0797208] ICU MEDICAL INC]

## (undated) DEVICE — AMD ANTIMICROBIAL BANDAGE ROLL,6 PLY: Brand: KERLIX

## (undated) DEVICE — (D)STRIP SKN CLSR 0.5X4IN WHT --

## (undated) DEVICE — CLAMP INSERT: Brand: STEALTH® CLAMP INSERT

## (undated) DEVICE — STERILE HOOK LOCK LATEX FREE ELASTIC BANDAGE 4INX5YD: Brand: HOOK LOCK™

## (undated) DEVICE — CANNULA INJ L2.5IN BLNT TIP 3MM CLR BODY W/ 1 W VLV DLP

## (undated) DEVICE — SINGLE BASIN: Brand: CARDINAL HEALTH

## (undated) DEVICE — BLADE ASSEMB CLP HAIR FINE --

## (undated) DEVICE — DRAIN WND RND SILICONE 15FR --

## (undated) DEVICE — SUTURE VCRL SZ 3-0 L27IN ABSRB UD L24MM PS-1 3/8 CIR PRIM J936H

## (undated) DEVICE — SUTURE PROL SZ 6-0 L30IN NONABSORBABLE BLU L13MM CC-1 3/8 M8707

## (undated) DEVICE — CLIP INT SM TI EZ LD LIG SYS WECK HORZ

## (undated) DEVICE — CONNECTOR IV 3/8X3/8X3/8 Y

## (undated) DEVICE — 3000CC GUARDIAN II: Brand: GUARDIAN

## (undated) DEVICE — SUT SLK 0 30IN CT1 BLK --

## (undated) DEVICE — (D)GLOVE SURG 8 PWD LTX -- DISC BY MFR USE ITEM 110052

## (undated) DEVICE — OCCLUDER CV VES 1.5X12 MM CORONARY INT SIL STRL FLO RST

## (undated) DEVICE — SUTURE SILK PERMAHAND PRECUT 6 X 30 IN SZ 1 BLK BRAID A307H

## (undated) DEVICE — COR-KNOT® QUICK LOAD® 6-POUCH: Brand: COR-KNOT® QUICK LOAD®

## (undated) DEVICE — OCCLUDER CV VES 1.25X12 MM CORONARY INT SIL STRL FLO RST

## (undated) DEVICE — DISH PTRI STRL --

## (undated) DEVICE — COR-KNOT MINI® COMBO KITBASE PACKAGE TYPE - KITEACH STERILE PACKAGE KIT CONTAINS (2) SINGLE PATIENT USE COR-KNOT MINI® DEVICES AND (12) COR-KNOT® QUICK LOADS®.: Brand: COR-KNOT MINI®

## (undated) DEVICE — 48" PROBE COVER W/GEL, ULTRASOUND, STERILE: Brand: SITE-RITE

## (undated) DEVICE — SUTURE VCRL SZ 2-0 L36IN ABSRB UD L36MM CT-1 1/2 CIR J945H

## (undated) DEVICE — Z DISCONTINUED NO SUB IDED DRAIN SURG 2 COLL PT TB FOR ATS BG OASIS

## (undated) DEVICE — BLADE OPHTH 180DEG CUT SURF BLU STR SHRP DBL BVL GRINDLESS

## (undated) DEVICE — SUT PROL 6-0 30IN C1 DA BLU --

## (undated) DEVICE — 3M™ IOBAN™ 2 ANTIMICROBIAL INCISE DRAPE 6650EZ: Brand: IOBAN™ 2

## (undated) DEVICE — STERILE LATEX POWDER-FREE SURGICAL GLOVESWITH NITRILE COATING: Brand: PROTEXIS